# Patient Record
Sex: MALE | Race: WHITE | NOT HISPANIC OR LATINO | Employment: OTHER | URBAN - METROPOLITAN AREA
[De-identification: names, ages, dates, MRNs, and addresses within clinical notes are randomized per-mention and may not be internally consistent; named-entity substitution may affect disease eponyms.]

---

## 2018-02-07 NOTE — PROGRESS NOTES
"                       Pre-Operative Orthopedic Assessment      Patient: Robby Hernandes    YOB: 1970      Age/Gender: 48 y.o. male  Medical Record Number: [id number]  Surgical Procedure Planned: LT TOTAL HIP ARTHROPLASTY ANTERIOR WITH HANA TABLE     Surgeon: Dameon Briggs MD    Date of Surgery Planned: 02/21/2018    Question Value Score    Patient Score   1. What is your age group? 50-65 years  66-75 years  >75 years =2  =1  =0 2   2. Gender? Male  Female =2  =1 2   3. How far on average can you walk? (a block is 200 meters) Two blocks or more (+\- rest)  1-2 blocks (+\- rest)  Housebound (most of time) =2  =1  =0 0   4. Which gait aid to you use? (more often than not) None  Single-Point Stick  Crutches/Frame =2  =1  =0 1   5. Do you use community  supports? (home help, meals on wheels, district nursing) None or one per week  Two or more per week =1  =0 1   6. Will you live with someone who can care for you after your operation? Yes  No =3  =0 3      Your Score (out of 12)  9     Key Destination at Discharge from acute care predicted by score   Scores < 6  -- extended inpatient rehabilitation   Scores 6-9 -- additional intervention to discharge directly home (Rehabilitation in home)   Scores > 9 -- directly home         Discharge Disposition/Planning:     Patient Response   Discussed the Predicted discharge disposition needed based on RAPT Assessment with the patient.    yes   Patient selected discharge disposition:   home   Out Patient Rehabilitation Facility of Choice:    none   Home Health Services Preferred:   Unsure of agency but used \"[facility]\" last year with right hip replacement and requests him again.  He will bring the information for \"[facility]\".   Has the patient completed or been scheduled to complete pre-operative testing? yes   Post-Operative Care Giver Name and Phone Number:    Brother Rui  [phone]     Subacute Inpatient Rehabilitation:  Complete this section only if planning inpatient " services at a Subacute Facility     Patient Response   Subacute Facility Preferred (Please list 2 facilities:      Requires pre-certification for inpatient rehabilitation services?         Planned source of transportation to inpatient rehabilitation facility?       If choosing inpatient services at an Acute or Subacute Facility please list a subsequent back-up plan (in case patient fails to qualify for inpatient rehabilitation). Back-up plans should include caregiver (family member or friend) for first 24-48 post- -operatively.       Home Equipment Patient Response   Does patient have a walker for home use?    yes   Does patient have a 3 in 1 commode for home use?    NO/NEEDS   Does patient have a shower chair for home use?    yes   Does patient have an elevated commode seat for home use? no   Does patient have a cane for home use?    yes   Is there any other medical equipment in the home? If so,  List in comment section below      Pre-Operative Class Attendance Patient Response   Attended or scheduled to attend the pre-operative class within 1 year of total joint replacement? He believes he had a class at last hospital either [facility] or [facility]   Not planning to attend the pre-operative class (see comments below)    Patient is coming from out of town and is not scheduled around a class time.   Patient Education  Completed   Expected time of discharge discussed yes   Encouraged to attend Pre-Operative Class    yes   Education re: Back-up plan for patients planning discharge to subacute facilities n/a   Education re: Predicted Discharge Disposition based on RAPT score    yes   Patient receptive and voiced understanding of information given    yes                                                                                                            Comments:    Address verified.  Patient does not have any steps to enter home.  Patient resides alone and states that he stayed with brother after last surgery but plans  "home and will ask brother to stay with him.  Patient had right hip at another hospital and is unsure of HH agency but does have number for the therapist he prefers \"[facility]\".  Patient will try to contact [facility]ier[facility] for agency name.                                       Signature: Divine Aldana RN    Date:  2/7/2018              "

## 2018-02-15 ENCOUNTER — HOSPITAL ENCOUNTER (OUTPATIENT)
Dept: GENERAL RADIOLOGY | Facility: HOSPITAL | Age: 48
Discharge: HOME | End: 2018-02-15
Admitting: ORTHOPAEDIC SURGERY

## 2018-02-15 ENCOUNTER — APPOINTMENT (OUTPATIENT)
Dept: PREADMISSION TESTING | Facility: HOSPITAL | Age: 48
End: 2018-02-15

## 2018-02-15 ENCOUNTER — HOSPITAL ENCOUNTER (OUTPATIENT)
Dept: GENERAL RADIOLOGY | Facility: HOSPITAL | Age: 48
Discharge: HOME | End: 2018-02-15

## 2018-02-15 VITALS
HEART RATE: 117 BPM | BODY MASS INDEX: 37.92 KG/M2 | HEIGHT: 69 IN | OXYGEN SATURATION: 96 % | DIASTOLIC BLOOD PRESSURE: 84 MMHG | WEIGHT: 256 LBS | RESPIRATION RATE: 18 BRPM | SYSTOLIC BLOOD PRESSURE: 141 MMHG | TEMPERATURE: 98.6 F

## 2018-02-15 LAB
ANION GAP SERPL CALCULATED.3IONS-SCNC: 12.5 MMOL/L
BILIRUB UR QL STRIP: NEGATIVE
BUN BLD-MCNC: 17 MG/DL (ref 6–20)
BUN/CREAT SERPL: 21.5 (ref 7–25)
CALCIUM SPEC-SCNC: 10.2 MG/DL (ref 8.6–10.5)
CHLORIDE SERPL-SCNC: 93 MMOL/L (ref 98–107)
CLARITY UR: CLEAR
CO2 SERPL-SCNC: 29.5 MMOL/L (ref 22–29)
COLOR UR: YELLOW
CREAT BLD-MCNC: 0.79 MG/DL (ref 0.76–1.27)
DEPRECATED RDW RBC AUTO: 40.1 FL (ref 37–54)
ERYTHROCYTE [DISTWIDTH] IN BLOOD BY AUTOMATED COUNT: 12.3 % (ref 11.5–14.5)
GFR SERPL CREATININE-BSD FRML MDRD: 104 ML/MIN/1.73
GLUCOSE BLD-MCNC: 233 MG/DL (ref 65–99)
GLUCOSE UR STRIP-MCNC: ABNORMAL MG/DL
HBA1C MFR BLD: 7.11 % (ref 4.8–5.6)
HCT VFR BLD AUTO: 46.1 % (ref 40.4–52.2)
HGB BLD-MCNC: 16.2 G/DL (ref 13.7–17.6)
HGB UR QL STRIP.AUTO: NEGATIVE
KETONES UR QL STRIP: NEGATIVE
LEUKOCYTE ESTERASE UR QL STRIP.AUTO: NEGATIVE
MCH RBC QN AUTO: 31.6 PG (ref 27–32.7)
MCHC RBC AUTO-ENTMCNC: 35.1 G/DL (ref 32.6–36.4)
MCV RBC AUTO: 89.9 FL (ref 79.8–96.2)
NITRITE UR QL STRIP: NEGATIVE
PH UR STRIP.AUTO: <=5 [PH] (ref 5–8)
PLATELET # BLD AUTO: 257 10*3/MM3 (ref 140–500)
PMV BLD AUTO: 10.4 FL (ref 6–12)
POTASSIUM BLD-SCNC: 3.8 MMOL/L (ref 3.5–5.2)
PROT UR QL STRIP: NEGATIVE
RBC # BLD AUTO: 5.13 10*6/MM3 (ref 4.6–6)
SODIUM BLD-SCNC: 135 MMOL/L (ref 136–145)
SP GR UR STRIP: 1.02 (ref 1–1.03)
UROBILINOGEN UR QL STRIP: ABNORMAL
WBC NRBC COR # BLD: 10.33 10*3/MM3 (ref 4.5–10.7)

## 2018-02-15 PROCEDURE — 73502 X-RAY EXAM HIP UNI 2-3 VIEWS: CPT

## 2018-02-15 PROCEDURE — 83036 HEMOGLOBIN GLYCOSYLATED A1C: CPT | Performed by: ORTHOPAEDIC SURGERY

## 2018-02-15 PROCEDURE — 85027 COMPLETE CBC AUTOMATED: CPT | Performed by: ORTHOPAEDIC SURGERY

## 2018-02-15 PROCEDURE — 93010 ELECTROCARDIOGRAM REPORT: CPT | Performed by: INTERNAL MEDICINE

## 2018-02-15 PROCEDURE — 71046 X-RAY EXAM CHEST 2 VIEWS: CPT

## 2018-02-15 PROCEDURE — 93005 ELECTROCARDIOGRAM TRACING: CPT

## 2018-02-15 PROCEDURE — 80048 BASIC METABOLIC PNL TOTAL CA: CPT | Performed by: ORTHOPAEDIC SURGERY

## 2018-02-15 PROCEDURE — 81003 URINALYSIS AUTO W/O SCOPE: CPT | Performed by: ORTHOPAEDIC SURGERY

## 2018-02-15 PROCEDURE — 36415 COLL VENOUS BLD VENIPUNCTURE: CPT

## 2018-02-15 RX ORDER — PRAVASTATIN SODIUM 40 MG
60 TABLET ORAL NIGHTLY
COMMUNITY
End: 2021-12-07

## 2018-02-15 RX ORDER — CHLORHEXIDINE GLUCONATE 500 MG/1
1 CLOTH TOPICAL TAKE AS DIRECTED
Status: ON HOLD | COMMUNITY
Start: 2018-02-20 | End: 2018-02-21

## 2018-02-15 RX ORDER — FENOFIBRATE 145 MG/1
145 TABLET, COATED ORAL NIGHTLY
Status: ON HOLD | COMMUNITY
End: 2018-02-21

## 2018-02-15 RX ORDER — VENLAFAXINE 75 MG/1
75 TABLET ORAL DAILY
COMMUNITY
End: 2021-12-07

## 2018-02-15 RX ORDER — HYDROCHLOROTHIAZIDE 12.5 MG/1
12.5 TABLET ORAL DAILY
COMMUNITY
End: 2021-12-07

## 2018-02-15 RX ORDER — CHOLECALCIFEROL (VITAMIN D3) 125 MCG
2000 CAPSULE ORAL DAILY
Status: ON HOLD | COMMUNITY
End: 2018-02-21

## 2018-02-15 ASSESSMENT — HOOS JR
HOOS JR SCORE: 16
HOOS JR SCORE: 39.902

## 2018-02-15 NOTE — DISCHARGE INSTRUCTIONS
Take the following medications the morning of surgery with a small sip of water:NONE    ARRIVE 9AM    General Instructions:  • Do not eat solid food after midnight the night before surgery.  • You may drink clear liquids day of surgery but must stop at least one hour before your hospital arrival time.  • It is beneficial for you to have a clear drink that contains carbohydrates the day of surgery.  We suggest a 12 to 20 ounce bottle of Gatorade or Powerade for non-diabetic patients or a 12 to 20 ounce bottle of G2 or Powerade Zero for diabetic patients. (Pediatric patients, are not advised to drink a 12 to 20 ounce carbohydrate drink)    Clear liquids are liquids you can see through.  Nothing red in color.     Plain water                               Sports drinks  Sodas                                   Gelatin (Jell-O)  Fruit juices without pulp such as white grape juice and apple juice  Popsicles that contain no fruit or yogurt  Tea or coffee (no cream or milk added)  Gatorade / Powerade  G2 / Powerade Zero    • Infants may have breast milk up to four hours before surgery.  • Infants drinking formula may drink formula up to six hours before surgery.   • Patients who avoid smoking, chewing tobacco and alcohol for 4 weeks prior to surgery have a reduced risk of post-operative complications.  Quit smoking as many days before surgery as you can.  • Do not smoke, use chewing tobacco or drink alcohol the day of surgery.   • If applicable bring your C-PAP/ BI-PAP machine.  • Bring any papers given to you in the doctor’s office.  • Wear clean comfortable clothes and socks.  • Do not wear contact lenses or make-up.  Bring a case for your glasses.   • Bring crutches or walker if applicable.  • Remove all piercings.  Leave jewelry and any other valuables at home.  • Hair extensions with metal clips must be removed prior to surgery.  • The Pre-Admission Testing nurse will instruct you to bring medications if unable to  obtain an accurate list in Pre-Admission Testing.        Preventing a Surgical Site Infection:  • For 2 to 3 days before surgery, avoid shaving with a razor because the razor can irritate skin and make it easier to develop an infection.  • The night prior to surgery sleep in a clean bed with clean clothing.  Do not allow pets to sleep with you.  • Shower on the morning of surgery using a fresh bar of anti-bacterial soap (such as Dial) and clean washcloth.  Dry with a clean towel and dress in clean clothing.  • Ask your surgeon if you will be receiving antibiotics prior to surgery.  • Make sure you, your family, and all healthcare providers clean their hands with soap and water or an alcohol based hand  before caring for you or your wound.    Day of surgery:  Upon arrival, a Pre-op nurse and Anesthesiologist will review your health history, obtain vital signs, and answer questions you may have.  The only belongings needed at this time will be your home medications and if applicable your C-PAP/BI-PAP machine.  If you are staying overnight your family can leave the rest of your belongings in the car and bring them to your room later.  A Pre-op nurse will start an IV and you may receive medication in preparation for surgery, including something to help you relax.  Your family will be able to see you in the Pre-op area.  While you are in surgery your family should notify the waiting room  if they leave the waiting room area and provide a contact phone number.    Please be aware that surgery does come with discomfort.  We want to make every effort to control your discomfort so please discuss any uncontrolled symptoms with your nurse.   Your doctor will most likely have prescribed pain medications.      If you are going home after surgery you will receive individualized written care instructions before being discharged.  A responsible adult must drive you to and from the hospital on the day of your  surgery and stay with you for 24 hours.    If you are staying overnight following surgery, you will be transported to your hospital room following the recovery period.  [facility] has all private rooms.    If you have any questions please call Pre-Admission Testing at [phone].  Deductibles and co-payments are collected on the day of service. Please be prepared to pay the required co-pay, deductible or deposit on the day of service as defined by your plan.    2% CHLORAHEXIDINE GLUCONATE* CLOTH  Preparing or “prepping” skin before surgery can reduce the risk of infection at the surgical site. To make the process easier, [facility] has chosen disposable cloths moistened with a rinse-free, 2% Chlorhexidine Gluconate (CHG) antiseptic solution. The steps below outline the prepping process and should be carefully followed.        Use the prep cloth on the area that is circled in the diagram             Directions Night before Surgery  1) Shower using a fresh bar of anti-bacterial soap (such as Dial) and clean washcloth.  Use a clean towel to completely dry your skin.  2) Do not use any lotions, oils or creams on your skin.  3) Open the package and remove 1 cloth, wipe your skin for 30 seconds in a circular motion.  Allow to dry for 3 minutes.  4) Repeat #3 with second cloth.  5) Do not touch your eyes, ears, or mouth with the prep cloth.  6) Allow the wet prep solution to air dry.  7) Discard the prep cloth and wash your hands with soap and water.   8) Dress in clean bed clothes and sleep on fresh clean bed sheets.   9) You may experience some temporary itching after the prep.    Directions Day of Surgery  1) Repeat steps 1,2,3,4,5,6,7, and 9.   2) Dress in clean clothes before coming to the hospital.    BACTROBAN NASAL OINTMENT  There are many germs normally in your nose. Bactroban is an ointment that will help reduce these germs. Please follow these instructions for Bactroban  use:    ____Two days before surgery in the evening Date________    ____The day before surgery in the morning  Date________    ____The day before surgery in the evening              Date________    ____The day of surgery in the morning    Date________    **Squirt ½ package of Bactroban Ointment onto a cotton applicator and apply to inside of 1st nostril.  Squirt the remaining Bactroban and apply to the inside of the other nostril.

## 2018-02-21 ENCOUNTER — APPOINTMENT (OUTPATIENT)
Dept: GENERAL RADIOLOGY | Facility: HOSPITAL | Age: 48
End: 2018-02-21

## 2018-02-21 ENCOUNTER — ANESTHESIA EVENT (OUTPATIENT)
Dept: PERIOP | Facility: HOSPITAL | Age: 48
End: 2018-02-21

## 2018-02-21 ENCOUNTER — HOSPITAL ENCOUNTER (INPATIENT)
Facility: HOSPITAL | Age: 48
LOS: 1 days | Discharge: HOME HEALTH | End: 2018-02-22
Attending: ORTHOPAEDIC SURGERY | Admitting: ORTHOPAEDIC SURGERY

## 2018-02-21 ENCOUNTER — ANESTHESIA (OUTPATIENT)
Dept: PERIOP | Facility: HOSPITAL | Age: 48
End: 2018-02-21

## 2018-02-21 DIAGNOSIS — Z96.642 STATUS POST TOTAL REPLACEMENT OF LEFT HIP: Primary | ICD-10-CM

## 2018-02-21 PROBLEM — E11.65 TYPE 2 DIABETES MELLITUS WITH HYPERGLYCEMIA: Status: ACTIVE | Noted: 2018-02-21

## 2018-02-21 PROBLEM — E11.9 DIABETES MELLITUS: Status: ACTIVE | Noted: 2018-02-21

## 2018-02-21 PROBLEM — M16.12 OSTEOARTHRITIS OF LEFT HIP: Status: ACTIVE | Noted: 2018-02-21

## 2018-02-21 PROBLEM — I10 HYPERTENSION: Status: ACTIVE | Noted: 2018-02-21

## 2018-02-21 PROBLEM — J44.9 COPD (CHRONIC OBSTRUCTIVE PULMONARY DISEASE): Status: ACTIVE | Noted: 2018-02-21

## 2018-02-21 PROBLEM — R00.0 SINUS TACHYCARDIA: Status: ACTIVE | Noted: 2018-02-21

## 2018-02-21 LAB
GLUCOSE BLDC GLUCOMTR-MCNC: 136 MG/DL (ref 70–130)
GLUCOSE BLDC GLUCOMTR-MCNC: 194 MG/DL (ref 70–130)
GLUCOSE BLDC GLUCOMTR-MCNC: 207 MG/DL (ref 70–130)
GLUCOSE BLDC GLUCOMTR-MCNC: 229 MG/DL (ref 70–130)

## 2018-02-21 PROCEDURE — 25010000002 FENTANYL CITRATE (PF) 100 MCG/2ML SOLUTION: Performed by: NURSE ANESTHETIST, CERTIFIED REGISTERED

## 2018-02-21 PROCEDURE — 25010000002 NEOSTIGMINE PER 0.5 MG: Performed by: NURSE ANESTHETIST, CERTIFIED REGISTERED

## 2018-02-21 PROCEDURE — 73501 X-RAY EXAM HIP UNI 1 VIEW: CPT

## 2018-02-21 PROCEDURE — 25810000003 SODIUM CHLORIDE 0.9 % WITH KCL 20 MEQ 20-0.9 MEQ/L-% SOLUTION: Performed by: ORTHOPAEDIC SURGERY

## 2018-02-21 PROCEDURE — 97162 PT EVAL MOD COMPLEX 30 MIN: CPT

## 2018-02-21 PROCEDURE — 0SRB04A REPLACEMENT OF LEFT HIP JOINT WITH CERAMIC ON POLYETHYLENE SYNTHETIC SUBSTITUTE, UNCEMENTED, OPEN APPROACH: ICD-10-PCS | Performed by: ORTHOPAEDIC SURGERY

## 2018-02-21 PROCEDURE — 25010000003 CEFAZOLIN IN DEXTROSE 2-4 GM/100ML-% SOLUTION: Performed by: ORTHOPAEDIC SURGERY

## 2018-02-21 PROCEDURE — 25010000002 FENTANYL CITRATE (PF) 100 MCG/2ML SOLUTION: Performed by: ANESTHESIOLOGY

## 2018-02-21 PROCEDURE — 25010000002 PROPOFOL 10 MG/ML EMULSION: Performed by: NURSE ANESTHETIST, CERTIFIED REGISTERED

## 2018-02-21 PROCEDURE — 97110 THERAPEUTIC EXERCISES: CPT

## 2018-02-21 PROCEDURE — 63710000001 INSULIN ASPART PER 5 UNITS: Performed by: INTERNAL MEDICINE

## 2018-02-21 PROCEDURE — C1776 JOINT DEVICE (IMPLANTABLE): HCPCS | Performed by: ORTHOPAEDIC SURGERY

## 2018-02-21 PROCEDURE — 76000 FLUOROSCOPY <1 HR PHYS/QHP: CPT

## 2018-02-21 PROCEDURE — 25010000002 ONDANSETRON PER 1 MG: Performed by: NURSE ANESTHETIST, CERTIFIED REGISTERED

## 2018-02-21 PROCEDURE — 25010000002 MIDAZOLAM PER 1 MG: Performed by: ANESTHESIOLOGY

## 2018-02-21 PROCEDURE — 82962 GLUCOSE BLOOD TEST: CPT

## 2018-02-21 PROCEDURE — 25010000002 HYDROMORPHONE PER 4 MG: Performed by: NURSE ANESTHETIST, CERTIFIED REGISTERED

## 2018-02-21 PROCEDURE — 93005 ELECTROCARDIOGRAM TRACING: CPT | Performed by: ORTHOPAEDIC SURGERY

## 2018-02-21 PROCEDURE — 93010 ELECTROCARDIOGRAM REPORT: CPT | Performed by: INTERNAL MEDICINE

## 2018-02-21 PROCEDURE — 25010000002 HYDRALAZINE PER 20 MG: Performed by: NURSE ANESTHETIST, CERTIFIED REGISTERED

## 2018-02-21 DEVICE — IMPLANTABLE DEVICE: Type: IMPLANTABLE DEVICE | Site: HIP | Status: FUNCTIONAL

## 2018-02-21 DEVICE — TOTAL HIP PRIMARY: Type: IMPLANTABLE DEVICE | Site: HIP | Status: FUNCTIONAL

## 2018-02-21 DEVICE — CAP HIP TM UPCHRG: Type: IMPLANTABLE DEVICE | Site: HIP | Status: FUNCTIONAL

## 2018-02-21 DEVICE — SCRW ACET CORT TRILOGY S/TAP 6.5X30: Type: IMPLANTABLE DEVICE | Site: HIP | Status: FUNCTIONAL

## 2018-02-21 DEVICE — BIOLOX® DELTA, CERAMIC FEMORAL HEAD, S, Ø 36/-3.5, TAPER 12/14
Type: IMPLANTABLE DEVICE | Site: HIP | Status: FUNCTIONAL
Brand: BIOLOX® DELTA

## 2018-02-21 DEVICE — CAP HIP VE UPCHRG: Type: IMPLANTABLE DEVICE | Site: HIP | Status: FUNCTIONAL

## 2018-02-21 DEVICE — PLUG DOME HL: Type: IMPLANTABLE DEVICE | Site: HIP | Status: FUNCTIONAL

## 2018-02-21 DEVICE — CAP CERAM HD HIP UPCHRG: Type: IMPLANTABLE DEVICE | Site: HIP | Status: FUNCTIONAL

## 2018-02-21 RX ORDER — FLUMAZENIL 0.1 MG/ML
0.2 INJECTION INTRAVENOUS AS NEEDED
Status: DISCONTINUED | OUTPATIENT
Start: 2018-02-21 | End: 2018-02-21 | Stop reason: HOSPADM

## 2018-02-21 RX ORDER — ONDANSETRON 4 MG/1
4 TABLET, ORALLY DISINTEGRATING ORAL EVERY 6 HOURS PRN
Status: DISCONTINUED | OUTPATIENT
Start: 2018-02-21 | End: 2018-02-22 | Stop reason: HOSPADM

## 2018-02-21 RX ORDER — FENTANYL CITRATE 50 UG/ML
50 INJECTION, SOLUTION INTRAMUSCULAR; INTRAVENOUS
Status: DISCONTINUED | OUTPATIENT
Start: 2018-02-21 | End: 2018-02-21 | Stop reason: HOSPADM

## 2018-02-21 RX ORDER — CEFAZOLIN SODIUM 2 G/100ML
2 INJECTION, SOLUTION INTRAVENOUS ONCE
Status: COMPLETED | OUTPATIENT
Start: 2018-02-21 | End: 2018-02-21

## 2018-02-21 RX ORDER — MIDAZOLAM HYDROCHLORIDE 1 MG/ML
1 INJECTION INTRAMUSCULAR; INTRAVENOUS
Status: DISCONTINUED | OUTPATIENT
Start: 2018-02-21 | End: 2018-02-21 | Stop reason: HOSPADM

## 2018-02-21 RX ORDER — BISACODYL 10 MG
10 SUPPOSITORY, RECTAL RECTAL DAILY PRN
Status: DISCONTINUED | OUTPATIENT
Start: 2018-02-21 | End: 2018-02-22 | Stop reason: HOSPADM

## 2018-02-21 RX ORDER — SENNA AND DOCUSATE SODIUM 50; 8.6 MG/1; MG/1
2 TABLET, FILM COATED ORAL 2 TIMES DAILY
Status: DISCONTINUED | OUTPATIENT
Start: 2018-02-21 | End: 2018-02-22 | Stop reason: HOSPADM

## 2018-02-21 RX ORDER — TRANEXAMIC ACID 100 MG/ML
INJECTION, SOLUTION INTRAVENOUS AS NEEDED
Status: DISCONTINUED | OUTPATIENT
Start: 2018-02-21 | End: 2018-02-21 | Stop reason: SURG

## 2018-02-21 RX ORDER — FAMOTIDINE 10 MG/ML
20 INJECTION, SOLUTION INTRAVENOUS ONCE
Status: COMPLETED | OUTPATIENT
Start: 2018-02-21 | End: 2018-02-21

## 2018-02-21 RX ORDER — UREA 10 %
1 LOTION (ML) TOPICAL NIGHTLY PRN
Status: DISCONTINUED | OUTPATIENT
Start: 2018-02-21 | End: 2018-02-22 | Stop reason: HOSPADM

## 2018-02-21 RX ORDER — PROMETHAZINE HYDROCHLORIDE 25 MG/ML
12.5 INJECTION, SOLUTION INTRAMUSCULAR; INTRAVENOUS ONCE AS NEEDED
Status: DISCONTINUED | OUTPATIENT
Start: 2018-02-21 | End: 2018-02-21 | Stop reason: HOSPADM

## 2018-02-21 RX ORDER — KETOROLAC TROMETHAMINE 30 MG/ML
15 INJECTION, SOLUTION INTRAMUSCULAR; INTRAVENOUS EVERY 6 HOURS PRN
Status: DISCONTINUED | OUTPATIENT
Start: 2018-02-21 | End: 2018-02-22 | Stop reason: HOSPADM

## 2018-02-21 RX ORDER — ATORVASTATIN CALCIUM 10 MG/1
10 TABLET, FILM COATED ORAL DAILY
Status: DISCONTINUED | OUTPATIENT
Start: 2018-02-21 | End: 2018-02-22 | Stop reason: HOSPADM

## 2018-02-21 RX ORDER — SODIUM CHLORIDE, SODIUM LACTATE, POTASSIUM CHLORIDE, CALCIUM CHLORIDE 600; 310; 30; 20 MG/100ML; MG/100ML; MG/100ML; MG/100ML
9 INJECTION, SOLUTION INTRAVENOUS CONTINUOUS
Status: DISCONTINUED | OUTPATIENT
Start: 2018-02-21 | End: 2018-02-21 | Stop reason: HOSPADM

## 2018-02-21 RX ORDER — SODIUM CHLORIDE 0.9 % (FLUSH) 0.9 %
1-10 SYRINGE (ML) INJECTION AS NEEDED
Status: DISCONTINUED | OUTPATIENT
Start: 2018-02-21 | End: 2018-02-21 | Stop reason: HOSPADM

## 2018-02-21 RX ORDER — ONDANSETRON 2 MG/ML
INJECTION INTRAMUSCULAR; INTRAVENOUS AS NEEDED
Status: DISCONTINUED | OUTPATIENT
Start: 2018-02-21 | End: 2018-02-21 | Stop reason: SURG

## 2018-02-21 RX ORDER — SODIUM CHLORIDE 0.9 % (FLUSH) 0.9 %
1-10 SYRINGE (ML) INJECTION AS NEEDED
Status: DISCONTINUED | OUTPATIENT
Start: 2018-02-21 | End: 2018-02-22 | Stop reason: HOSPADM

## 2018-02-21 RX ORDER — ONDANSETRON 2 MG/ML
4 INJECTION INTRAMUSCULAR; INTRAVENOUS ONCE AS NEEDED
Status: DISCONTINUED | OUTPATIENT
Start: 2018-02-21 | End: 2018-02-21 | Stop reason: HOSPADM

## 2018-02-21 RX ORDER — HYDROCODONE BITARTRATE AND ACETAMINOPHEN 7.5; 325 MG/1; MG/1
1 TABLET ORAL ONCE AS NEEDED
Status: DISCONTINUED | OUTPATIENT
Start: 2018-02-21 | End: 2018-02-21 | Stop reason: HOSPADM

## 2018-02-21 RX ORDER — ONDANSETRON 4 MG/1
4 TABLET, FILM COATED ORAL EVERY 6 HOURS PRN
Status: DISCONTINUED | OUTPATIENT
Start: 2018-02-21 | End: 2018-02-22 | Stop reason: HOSPADM

## 2018-02-21 RX ORDER — VENLAFAXINE 75 MG/1
75 TABLET ORAL DAILY
Status: DISCONTINUED | OUTPATIENT
Start: 2018-02-21 | End: 2018-02-22 | Stop reason: HOSPADM

## 2018-02-21 RX ORDER — PROMETHAZINE HYDROCHLORIDE 25 MG/1
12.5 TABLET ORAL ONCE AS NEEDED
Status: DISCONTINUED | OUTPATIENT
Start: 2018-02-21 | End: 2018-02-21 | Stop reason: HOSPADM

## 2018-02-21 RX ORDER — HYDRALAZINE HYDROCHLORIDE 20 MG/ML
5 INJECTION INTRAMUSCULAR; INTRAVENOUS
Status: DISCONTINUED | OUTPATIENT
Start: 2018-02-21 | End: 2018-02-21 | Stop reason: HOSPADM

## 2018-02-21 RX ORDER — HYDROMORPHONE HCL 110MG/55ML
PATIENT CONTROLLED ANALGESIA SYRINGE INTRAVENOUS AS NEEDED
Status: DISCONTINUED | OUTPATIENT
Start: 2018-02-21 | End: 2018-02-21 | Stop reason: SURG

## 2018-02-21 RX ORDER — NALOXONE HCL 0.4 MG/ML
0.1 VIAL (ML) INJECTION
Status: DISCONTINUED | OUTPATIENT
Start: 2018-02-21 | End: 2018-02-22 | Stop reason: HOSPADM

## 2018-02-21 RX ORDER — HYDROCHLOROTHIAZIDE 25 MG/1
12.5 TABLET ORAL DAILY
Status: DISCONTINUED | OUTPATIENT
Start: 2018-02-21 | End: 2018-02-22 | Stop reason: HOSPADM

## 2018-02-21 RX ORDER — PROPOFOL 10 MG/ML
VIAL (ML) INTRAVENOUS AS NEEDED
Status: DISCONTINUED | OUTPATIENT
Start: 2018-02-21 | End: 2018-02-21 | Stop reason: SURG

## 2018-02-21 RX ORDER — IPRATROPIUM BROMIDE AND ALBUTEROL SULFATE 2.5; .5 MG/3ML; MG/3ML
3 SOLUTION RESPIRATORY (INHALATION) EVERY 6 HOURS PRN
Status: DISCONTINUED | OUTPATIENT
Start: 2018-02-21 | End: 2018-02-22 | Stop reason: HOSPADM

## 2018-02-21 RX ORDER — MIDAZOLAM HYDROCHLORIDE 1 MG/ML
2 INJECTION INTRAMUSCULAR; INTRAVENOUS
Status: DISCONTINUED | OUTPATIENT
Start: 2018-02-21 | End: 2018-02-21 | Stop reason: HOSPADM

## 2018-02-21 RX ORDER — GLYCOPYRROLATE 0.2 MG/ML
INJECTION INTRAMUSCULAR; INTRAVENOUS AS NEEDED
Status: DISCONTINUED | OUTPATIENT
Start: 2018-02-21 | End: 2018-02-21 | Stop reason: SURG

## 2018-02-21 RX ORDER — PROMETHAZINE HYDROCHLORIDE 25 MG/1
25 SUPPOSITORY RECTAL ONCE AS NEEDED
Status: DISCONTINUED | OUTPATIENT
Start: 2018-02-21 | End: 2018-02-21 | Stop reason: HOSPADM

## 2018-02-21 RX ORDER — DIPHENHYDRAMINE HYDROCHLORIDE 50 MG/ML
12.5 INJECTION INTRAMUSCULAR; INTRAVENOUS
Status: DISCONTINUED | OUTPATIENT
Start: 2018-02-21 | End: 2018-02-21 | Stop reason: HOSPADM

## 2018-02-21 RX ORDER — SODIUM CHLORIDE AND POTASSIUM CHLORIDE 150; 900 MG/100ML; MG/100ML
100 INJECTION, SOLUTION INTRAVENOUS CONTINUOUS
Status: DISCONTINUED | OUTPATIENT
Start: 2018-02-21 | End: 2018-02-22

## 2018-02-21 RX ORDER — DEXTROSE MONOHYDRATE 25 G/50ML
25 INJECTION, SOLUTION INTRAVENOUS
Status: DISCONTINUED | OUTPATIENT
Start: 2018-02-21 | End: 2018-02-22 | Stop reason: HOSPADM

## 2018-02-21 RX ORDER — HYDROMORPHONE HCL 110MG/55ML
0.5 PATIENT CONTROLLED ANALGESIA SYRINGE INTRAVENOUS
Status: DISCONTINUED | OUTPATIENT
Start: 2018-02-21 | End: 2018-02-22 | Stop reason: HOSPADM

## 2018-02-21 RX ORDER — LIDOCAINE HYDROCHLORIDE 10 MG/ML
0.5 INJECTION, SOLUTION EPIDURAL; INFILTRATION; INTRACAUDAL; PERINEURAL ONCE AS NEEDED
Status: DISCONTINUED | OUTPATIENT
Start: 2018-02-21 | End: 2018-02-21 | Stop reason: HOSPADM

## 2018-02-21 RX ORDER — LABETALOL HYDROCHLORIDE 5 MG/ML
5 INJECTION, SOLUTION INTRAVENOUS
Status: DISCONTINUED | OUTPATIENT
Start: 2018-02-21 | End: 2018-02-21 | Stop reason: HOSPADM

## 2018-02-21 RX ORDER — OXYCODONE AND ACETAMINOPHEN 7.5; 325 MG/1; MG/1
1 TABLET ORAL ONCE AS NEEDED
Status: DISCONTINUED | OUTPATIENT
Start: 2018-02-21 | End: 2018-02-21 | Stop reason: HOSPADM

## 2018-02-21 RX ORDER — LIDOCAINE HYDROCHLORIDE 20 MG/ML
INJECTION, SOLUTION INFILTRATION; PERINEURAL AS NEEDED
Status: DISCONTINUED | OUTPATIENT
Start: 2018-02-21 | End: 2018-02-21 | Stop reason: SURG

## 2018-02-21 RX ORDER — NICOTINE POLACRILEX 4 MG
15 LOZENGE BUCCAL
Status: DISCONTINUED | OUTPATIENT
Start: 2018-02-21 | End: 2018-02-22 | Stop reason: HOSPADM

## 2018-02-21 RX ORDER — NALOXONE HCL 0.4 MG/ML
0.2 VIAL (ML) INJECTION AS NEEDED
Status: DISCONTINUED | OUTPATIENT
Start: 2018-02-21 | End: 2018-02-21 | Stop reason: HOSPADM

## 2018-02-21 RX ORDER — FAMOTIDINE 20 MG/1
40 TABLET, FILM COATED ORAL DAILY
Status: DISCONTINUED | OUTPATIENT
Start: 2018-02-21 | End: 2018-02-22 | Stop reason: HOSPADM

## 2018-02-21 RX ORDER — ROCURONIUM BROMIDE 10 MG/ML
INJECTION, SOLUTION INTRAVENOUS AS NEEDED
Status: DISCONTINUED | OUTPATIENT
Start: 2018-02-21 | End: 2018-02-21 | Stop reason: SURG

## 2018-02-21 RX ORDER — CHLORDIAZEPOXIDE HYDROCHLORIDE 25 MG/1
25 CAPSULE, GELATIN COATED ORAL NIGHTLY
Status: DISCONTINUED | OUTPATIENT
Start: 2018-02-21 | End: 2018-02-22 | Stop reason: HOSPADM

## 2018-02-21 RX ORDER — ACETAMINOPHEN 325 MG/1
325 TABLET ORAL EVERY 4 HOURS PRN
Status: DISCONTINUED | OUTPATIENT
Start: 2018-02-21 | End: 2018-02-22 | Stop reason: HOSPADM

## 2018-02-21 RX ORDER — OXYCODONE AND ACETAMINOPHEN 10; 325 MG/1; MG/1
1 TABLET ORAL EVERY 4 HOURS PRN
Status: DISCONTINUED | OUTPATIENT
Start: 2018-02-21 | End: 2018-02-22 | Stop reason: HOSPADM

## 2018-02-21 RX ORDER — ONDANSETRON 2 MG/ML
4 INJECTION INTRAMUSCULAR; INTRAVENOUS EVERY 6 HOURS PRN
Status: DISCONTINUED | OUTPATIENT
Start: 2018-02-21 | End: 2018-02-22 | Stop reason: HOSPADM

## 2018-02-21 RX ORDER — EPHEDRINE SULFATE 50 MG/ML
5 INJECTION, SOLUTION INTRAVENOUS ONCE AS NEEDED
Status: DISCONTINUED | OUTPATIENT
Start: 2018-02-21 | End: 2018-02-21 | Stop reason: HOSPADM

## 2018-02-21 RX ORDER — OXYCODONE HYDROCHLORIDE 5 MG/1
20 TABLET ORAL EVERY 4 HOURS PRN
Status: DISCONTINUED | OUTPATIENT
Start: 2018-02-21 | End: 2018-02-22 | Stop reason: HOSPADM

## 2018-02-21 RX ORDER — DIAZEPAM 5 MG/1
5 TABLET ORAL EVERY 6 HOURS PRN
Status: DISCONTINUED | OUTPATIENT
Start: 2018-02-21 | End: 2018-02-22 | Stop reason: HOSPADM

## 2018-02-21 RX ORDER — CEFAZOLIN SODIUM 2 G/100ML
2 INJECTION, SOLUTION INTRAVENOUS EVERY 8 HOURS
Status: COMPLETED | OUTPATIENT
Start: 2018-02-21 | End: 2018-02-22

## 2018-02-21 RX ORDER — HYDROMORPHONE HCL 110MG/55ML
0.5 PATIENT CONTROLLED ANALGESIA SYRINGE INTRAVENOUS
Status: DISCONTINUED | OUTPATIENT
Start: 2018-02-21 | End: 2018-02-21 | Stop reason: HOSPADM

## 2018-02-21 RX ORDER — PROMETHAZINE HYDROCHLORIDE 25 MG/1
25 TABLET ORAL ONCE AS NEEDED
Status: DISCONTINUED | OUTPATIENT
Start: 2018-02-21 | End: 2018-02-21 | Stop reason: HOSPADM

## 2018-02-21 RX ORDER — FENTANYL CITRATE 50 UG/ML
INJECTION, SOLUTION INTRAMUSCULAR; INTRAVENOUS AS NEEDED
Status: DISCONTINUED | OUTPATIENT
Start: 2018-02-21 | End: 2018-02-21 | Stop reason: SURG

## 2018-02-21 RX ORDER — MAGNESIUM HYDROXIDE 1200 MG/15ML
LIQUID ORAL AS NEEDED
Status: DISCONTINUED | OUTPATIENT
Start: 2018-02-21 | End: 2018-02-21 | Stop reason: HOSPADM

## 2018-02-21 RX ADMIN — SODIUM CHLORIDE, POTASSIUM CHLORIDE, SODIUM LACTATE AND CALCIUM CHLORIDE: 600; 310; 30; 20 INJECTION, SOLUTION INTRAVENOUS at 11:35

## 2018-02-21 RX ADMIN — FENTANYL CITRATE 50 MCG: 50 INJECTION INTRAMUSCULAR; INTRAVENOUS at 11:20

## 2018-02-21 RX ADMIN — FENTANYL CITRATE 50 MCG: 50 INJECTION, SOLUTION INTRAMUSCULAR; INTRAVENOUS at 13:35

## 2018-02-21 RX ADMIN — ROCURONIUM BROMIDE 10 MG: 10 INJECTION INTRAVENOUS at 10:41

## 2018-02-21 RX ADMIN — SODIUM CHLORIDE, POTASSIUM CHLORIDE, SODIUM LACTATE AND CALCIUM CHLORIDE: 600; 310; 30; 20 INJECTION, SOLUTION INTRAVENOUS at 10:18

## 2018-02-21 RX ADMIN — CEFAZOLIN SODIUM 2 G: 2 INJECTION, SOLUTION INTRAVENOUS at 17:11

## 2018-02-21 RX ADMIN — ATORVASTATIN CALCIUM 10 MG: 10 TABLET, FILM COATED ORAL at 16:13

## 2018-02-21 RX ADMIN — LINAGLIPTIN 5 MG: 5 TABLET, FILM COATED ORAL at 16:13

## 2018-02-21 RX ADMIN — OXYCODONE HYDROCHLORIDE 20 MG: 5 TABLET ORAL at 16:13

## 2018-02-21 RX ADMIN — SODIUM CHLORIDE, POTASSIUM CHLORIDE, SODIUM LACTATE AND CALCIUM CHLORIDE 9 ML/HR: 600; 310; 30; 20 INJECTION, SOLUTION INTRAVENOUS at 09:31

## 2018-02-21 RX ADMIN — LIDOCAINE HYDROCHLORIDE 60 MG: 20 INJECTION, SOLUTION INFILTRATION; PERINEURAL at 10:21

## 2018-02-21 RX ADMIN — MIDAZOLAM 1 MG: 1 INJECTION INTRAMUSCULAR; INTRAVENOUS at 09:55

## 2018-02-21 RX ADMIN — FAMOTIDINE 20 MG: 10 INJECTION, SOLUTION INTRAVENOUS at 09:55

## 2018-02-21 RX ADMIN — VENLAFAXINE HYDROCHLORIDE 75 MG: 75 TABLET ORAL at 16:13

## 2018-02-21 RX ADMIN — GLYCOPYRROLATE 0.4 MG: 0.2 INJECTION INTRAMUSCULAR; INTRAVENOUS at 12:25

## 2018-02-21 RX ADMIN — INSULIN ASPART 3 UNITS: 100 INJECTION, SOLUTION INTRAVENOUS; SUBCUTANEOUS at 21:51

## 2018-02-21 RX ADMIN — OXYCODONE HYDROCHLORIDE 20 MG: 5 TABLET ORAL at 20:41

## 2018-02-21 RX ADMIN — CHLORDIAZEPOXIDE HYDROCHLORIDE 25 MG: 25 CAPSULE ORAL at 21:51

## 2018-02-21 RX ADMIN — Medication 5 MG: at 13:05

## 2018-02-21 RX ADMIN — HYDROCHLOROTHIAZIDE 12.5 MG: 25 TABLET ORAL at 16:13

## 2018-02-21 RX ADMIN — ROCURONIUM BROMIDE 20 MG: 10 INJECTION INTRAVENOUS at 11:25

## 2018-02-21 RX ADMIN — NEOSTIGMINE METHYLSULFATE 3 MG: 1 INJECTION INTRAMUSCULAR; INTRAVENOUS; SUBCUTANEOUS at 12:25

## 2018-02-21 RX ADMIN — TRANEXAMIC ACID 1000 MG: 100 INJECTION, SOLUTION INTRAVENOUS at 10:33

## 2018-02-21 RX ADMIN — PROPOFOL 200 MG: 10 INJECTION, EMULSION INTRAVENOUS at 10:21

## 2018-02-21 RX ADMIN — FAMOTIDINE 40 MG: 20 TABLET, FILM COATED ORAL at 16:13

## 2018-02-21 RX ADMIN — FENTANYL CITRATE 50 MCG: 50 INJECTION, SOLUTION INTRAMUSCULAR; INTRAVENOUS at 13:48

## 2018-02-21 RX ADMIN — ONDANSETRON 4 MG: 2 INJECTION INTRAMUSCULAR; INTRAVENOUS at 10:30

## 2018-02-21 RX ADMIN — METFORMIN HYDROCHLORIDE 1000 MG: 1000 TABLET ORAL at 17:11

## 2018-02-21 RX ADMIN — FENTANYL CITRATE 50 MCG: 50 INJECTION INTRAMUSCULAR; INTRAVENOUS at 10:21

## 2018-02-21 RX ADMIN — FENTANYL CITRATE 50 MCG: 50 INJECTION INTRAMUSCULAR; INTRAVENOUS at 10:40

## 2018-02-21 RX ADMIN — HYDROMORPHONE HYDROCHLORIDE 0.5 MG: 2 INJECTION INTRAMUSCULAR; INTRAVENOUS; SUBCUTANEOUS at 11:40

## 2018-02-21 RX ADMIN — ROCURONIUM BROMIDE 40 MG: 10 INJECTION INTRAVENOUS at 10:21

## 2018-02-21 RX ADMIN — SODIUM CHLORIDE, POTASSIUM CHLORIDE, SODIUM LACTATE AND CALCIUM CHLORIDE: 600; 310; 30; 20 INJECTION, SOLUTION INTRAVENOUS at 10:21

## 2018-02-21 RX ADMIN — FENTANYL CITRATE 50 MCG: 50 INJECTION INTRAMUSCULAR; INTRAVENOUS at 11:46

## 2018-02-21 RX ADMIN — HYDROMORPHONE HYDROCHLORIDE 0.5 MG: 2 INJECTION INTRAMUSCULAR; INTRAVENOUS; SUBCUTANEOUS at 11:17

## 2018-02-21 RX ADMIN — POTASSIUM CHLORIDE AND SODIUM CHLORIDE 100 ML/HR: 900; 150 INJECTION, SOLUTION INTRAVENOUS at 16:10

## 2018-02-21 RX ADMIN — DOCUSATE SODIUM -SENNOSIDES 2 TABLET: 50; 8.6 TABLET, COATED ORAL at 20:41

## 2018-02-21 RX ADMIN — Medication 5 MG: at 12:53

## 2018-02-21 RX ADMIN — CEFAZOLIN SODIUM 2 G: 2 INJECTION, SOLUTION INTRAVENOUS at 10:20

## 2018-02-21 NOTE — H&P
ORTHOPEDIC SURGERY PRE-OP HISTORY AND PHYSICAL      Patient: Robby Hernandes  Date of Admission: 2/21/2018  8:31 AM  YOB: 1970  Medical Record Number: [id number]  Attending Physician: Dameon Briggs MD  Consulting Physician: Dameon Briggs MD    CHIEF COMPLIANT: Left hip Pain.      HISTORY OF PRESENT ILLINESS: Patient is a 48 y.o. year old male presents to [facility] with above complaints.  The patient failed conservative treatment and patient requested surgical intervention.  Presents today for left AYDEN.        ALLERGIES:   Allergies   Allergen Reactions   • Lyrica [Pregabalin] Hallucinations       HOME MEDICATIONS:  Prescriptions Prior to Admission   Medication Sig Dispense Refill Last Dose   • Chlorhexidine Gluconate Cloth 2 % pads Apply  topically Take As Directed.      • Cholecalciferol (VITAMIN D3) 2000 units tablet Take 2,000 Units by mouth Daily.      • fenofibrate (TRICOR) 145 MG tablet Take 145 mg by mouth Daily.      • hydrochlorothiazide (HYDRODIURIL) 12.5 MG tablet Take 12.5 mg by mouth Daily.      • metFORMIN (GLUCOPHAGE) 500 MG tablet Take 1,000 mg by mouth 2 (Two) Times a Day With Meals.      • mupirocin (BACTROBAN) 2 % nasal ointment into each nostril Take As Directed.      • pravastatin (PRAVACHOL) 40 MG tablet Take 40 mg by mouth Daily.      • SITagliptin (JANUVIA) 100 MG tablet Take 100 mg by mouth Daily.      • venlafaxine (EFFEXOR) 75 MG tablet Take 75 mg by mouth Daily.          Past Medical History:   Diagnosis Date   • Anxiety and depression    • Asthma    • Chronic knee pain     RIGHT   • Complex regional pain syndrome     LEFT ANKLE   • COPD (chronic obstructive pulmonary disease)    • Diabetes mellitus    • Fracture of nasal septum 1987    CAN NOT BREATH OUT OF RIGHT NOSTRIL   • High cholesterol    • Hip pain, chronic, left    • History of heart attack     PER EKG DR MILIAN   • History of kidney stones    • [place] (hard of hearing)    • Hypertension    •  Insomnia    • Low back pain    • Shoulder pain, left    • Sleep apnea     DOES NOT USE MACHINE   • Tinnitus      Past Surgical History:   Procedure Laterality Date   • ANKLE FUSION Left    • CYSTOSCOPY BLADDER STONE LITHOTRIPSY     • TOTAL HIP ARTHROPLASTY Right      Social History     Occupational History   • Not on file.     Social History Main Topics   • Smoking status: Former Smoker     Packs/day: 2.00     Years: 10.00     Types: Cigarettes     Quit date: 2008   • Smokeless tobacco: Never Used   • Alcohol use 2.4 oz/week     4 Cans of beer per week   • Drug use: No   • Sexual activity: Not on file      Social History     Social History Narrative   • No narrative on file     Family History   Problem Relation Age of Onset   • Malig Hyperthermia Neg Hx        REVIEW OF SYSTEMS:    HEENT: Patient denies any headaches, vision changes, change in hearing, or tinnitus, Patient denies epistaxis, sinus pain, hoarseness, or dysphagia   Pulmonary: Patient denies any cough, congestion, acute change in SOA or wheezing.   Cardiovascular: Patient denies any change in chest pain, dyspnea, palpitations, weakness, intolerance of exercise, varicosities, change in murmur   Gastrointestinal:  Patient denies change in appetite, melena, change in bowel habits.   Genital/Urinary: Patient denies dysuria, change in color of urine, change in frequency of urination, pain with urgency, change in incontinence, retention.   Musculoskeletal: Patient denies complaints of acute changes in symptoms of other joints not mentioned above.   Neurological: Patient denies changes in dizziness, tremor, ataxia, or difficulty in speaking or changes in memory.   Endocrine system: Patient denies acute changes in tremors, palpitations, polyuria, polydipsia, polyphagia, diaphoresis, exophthalmos, or goiter.   Psychological: Patient denies thoughts/plans or harming self or other; denies acute changes in depression,  insomnia, night terrors, yonis,  disorientation.   Skin: Patient denies any bruising, rashes, discoloration, pruritus,or wounds not mentioned in history of present illness or chief complaint above.   Hematopoietic: Patient denies current bleeding, epistaxis, hematuria, or melena.    PHYSICAL EXAM:   Vitals:  There were no vitals filed for this visit.    General:  48 y.o. male who appears about stated age.    Alert, cooperative, in no acute distress                       Head:    Normocephalic, without obvious abnormality, atraumatic   Eyes:            Lids and lashes normal, conjunctivae and sclerae normal, no         icterus, no pallor, corneas clear, PERRLA   Ears:    Ears appear intact with no abnormalities noted   Throat:   No oral lesions, no thrush, oral mucosa moist   Neck:   No adenopathy, supple, trachea midline, no JVD   Back:     Limited exam shows no severe kyphosis present,no visible           erythema, no excessive  tenderness to palpation.    Lungs:     Respirations regular, even and unlabored.     Heart:    Normal rate, Pulses palpable   Chest Wall:    No abnormalities observed.   Abdomen:     Normal bowel sounds, no masses, no organomegaly, soft              non-tender, non-distended, no guarding, no rebound                      tenderness   Rectal:     Deferred   Pulses:   Pulses palpable and equal bilaterally   Skin:   No bleeding, bruising or rash   Lymph nodes:   No palpable adenopathy   Extremities:     Left hip skin intact. Painful ROM.  NVI distally.      DIAGNOSTIC TEST:  No visits with results within 2 Day(s) from this visit.  Latest known visit with results is:    Appointment on 02/15/2018   Component Date Value Ref Range Status   • WBC 02/15/2018 10.33  4.50 - 10.70 10*3/mm3 Final   • RBC 02/15/2018 5.13  4.60 - 6.00 10*6/mm3 Final   • Hemoglobin 02/15/2018 16.2  13.7 - 17.6 g/dL Final   • Hematocrit 02/15/2018 46.1  40.4 - 52.2 % Final   • MCV 02/15/2018 89.9  79.8 - 96.2 fL Final   • MCH 02/15/2018 31.6  27.0 - 32.7 pg  Final   • MCHC 02/15/2018 35.1  32.6 - 36.4 g/dL Final   • RDW 02/15/2018 12.3  11.5 - 14.5 % Final   • RDW-SD 02/15/2018 40.1  37.0 - 54.0 fl Final   • MPV 02/15/2018 10.4  6.0 - 12.0 fL Final   • Platelets 02/15/2018 257  140 - 500 10*3/mm3 Final   • Glucose 02/15/2018 233* 65 - 99 mg/dL Final   • BUN 02/15/2018 17  6 - 20 mg/dL Final   • Creatinine 02/15/2018 0.79  0.76 - 1.27 mg/dL Final   • Sodium 02/15/2018 135* 136 - 145 mmol/L Final   • Potassium 02/15/2018 3.8  3.5 - 5.2 mmol/L Final   • Chloride 02/15/2018 93* 98 - 107 mmol/L Final   • CO2 02/15/2018 29.5* 22.0 - 29.0 mmol/L Final   • Calcium 02/15/2018 10.2  8.6 - 10.5 mg/dL Final   • eGFR Non African Amer 02/15/2018 104  >60 mL/min/1.73 Final   • BUN/Creatinine Ratio 02/15/2018 21.5  7.0 - 25.0 Final   • Anion Gap 02/15/2018 12.5  mmol/L Final   • Hemoglobin A1C 02/15/2018 7.11* 4.80 - 5.60 % Final   • Color, UA 02/15/2018 Yellow  Yellow, Straw Final   • Appearance, UA 02/15/2018 Clear  Clear Final   • pH, UA 02/15/2018 <=5.0  5.0 - 8.0 Final   • Specific Gravity, UA 02/15/2018 1.023  1.005 - 1.030 Final   • Glucose, UA 02/15/2018 250 mg/dL (1+)* Negative Final   • Ketones, UA 02/15/2018 Negative  Negative Final   • Bilirubin, UA 02/15/2018 Negative  Negative Final   • Blood, UA 02/15/2018 Negative  Negative Final   • Protein, UA 02/15/2018 Negative  Negative Final   • Leuk Esterase, UA 02/15/2018 Negative  Negative Final   • Nitrite, UA 02/15/2018 Negative  Negative Final   • Urobilinogen, UA 02/15/2018 0.2 E.U./dL  0.2 - 1.0 E.U./dL Final       Xr Chest Pa & Lateral    Result Date: 2/15/2018  Narrative: PA AND LATERAL CHEST X-RAY, PELVIS AND LEFT HIP X-RAY.  HISTORY: Left hip pain, preop. Hypertension.  TECHNIQUE: PA and lateral images of the chest, an AP view of the pelvis, and 2 views of the left hip are provided.  COMPARISON: There is no prior exam for comparison.  FINDINGS: Chest x-ray shows a normal cardiomediastinal silhouette with clear lungs.  There is a spinal cord stimulator device projecting over the lower thoracic spine. The bones of the chest appear normal.  The electronic stimulator device projects over the right pelvis. There is also right hip arthroplasty hardware, partially demonstrated. The SI joints and symphysis pubis and the bones of the pelvis appear normal. At the left hip, the radiographic joint space appears normal superiorly. There is no bone lesion or osseous deformity appreciated around the left hip.      Impression: 1. Negative chest x-ray. 2. No abnormality is identified at the left hip or pelvis on x-ray.  This report was finalized on 2/15/2018 9:05 PM by Dr. Jairon Mirza MD.      Xr Hip With Or Without Pelvis 2 - 3 View Left    Result Date: 2/15/2018  Narrative: PA AND LATERAL CHEST X-RAY, PELVIS AND LEFT HIP X-RAY.  HISTORY: Left hip pain, preop. Hypertension.  TECHNIQUE: PA and lateral images of the chest, an AP view of the pelvis, and 2 views of the left hip are provided.  COMPARISON: There is no prior exam for comparison.  FINDINGS: Chest x-ray shows a normal cardiomediastinal silhouette with clear lungs. There is a spinal cord stimulator device projecting over the lower thoracic spine. The bones of the chest appear normal.  The electronic stimulator device projects over the right pelvis. There is also right hip arthroplasty hardware, partially demonstrated. The SI joints and symphysis pubis and the bones of the pelvis appear normal. At the left hip, the radiographic joint space appears normal superiorly. There is no bone lesion or osseous deformity appreciated around the left hip.      Impression: 1. Negative chest x-ray. 2. No abnormality is identified at the left hip or pelvis on x-ray.  This report was finalized on 2/15/2018 9:05 PM by Dr. Jairon Mirza MD.          ASSESSMENT:  Left hip OA  Patient Active Problem List   Diagnosis   • Osteoarthritis of left hip       PLAN:    Left AYDEN, anterior approach.    Risks and  benefits of surgical intervention were discussed in detail with the patient.  Risks of infection, fracture, dislocation, extremity length discrepancy, neurovascular injury, persistent pain, medical risks, anesthetic risk, need for additional surgery, deep venous thrombosis, pulmonary embolism and death.      The above diagnosis and treatment plan was discussed with the patient and/or family.  They were educated in both non-surgical and surgical treatment options for their condition.   They were given the opportunity to ask questions and were answered to their satisfaction.  They agreed to proceed with the above treatment plan.        Dameon Briggs MD  Date: 2/21/2018

## 2018-02-21 NOTE — ANESTHESIA PROCEDURE NOTES
Airway  Urgency: elective    Date/Time: 2/21/2018 10:24 AM  Airway not difficult    General Information and Staff    Patient location during procedure: OR  Anesthesiologist: MACK BEAVER  CRNA: RICH YANG    Indications and Patient Condition  Indications for airway management: airway protection    Preoxygenated: yes  MILS not maintained throughout  Mask difficulty assessment: 1 - vent by mask    Final Airway Details  Final airway type: endotracheal airway      Successful airway: ETT  Cuffed: yes   Successful intubation technique: direct laryngoscopy  Facilitating devices/methods: intubating stylet and cricoid pressure  Endotracheal tube insertion site: oral  Blade: Fariba  Blade size: #3  ETT size: 8.0 mm  Cormack-Lehane Classification: grade I - full view of glottis  Placement verified by: chest auscultation and capnometry   Measured from: lips  ETT to lips (cm): 22  Number of attempts at approach: 1    Additional Comments  Top front left tooth chipped in preop- discussed with patient. PreO2 100%, FEO2 >85, SIVI, easy BMV, sniff position, ETT placed/ confirmed, attraumatic, teeth and lips as preop.

## 2018-02-21 NOTE — PLAN OF CARE
Problem: Patient Care Overview (Adult)  Goal: Plan of Care Review  Outcome: Ongoing (interventions implemented as appropriate)   02/21/18 0938   Coping/Psychosocial Response Interventions   Plan Of Care Reviewed With patient   Patient Care Overview   Progress progress toward functional goals as expected     Goal: Adult Individualization and Mutuality  Outcome: Ongoing (interventions implemented as appropriate)   02/21/18 0938   Individualization   Patient Specific Preferences pt goes by Robby     Goal: Discharge Needs Assessment  Outcome: Ongoing (interventions implemented as appropriate)   02/21/18 0926 02/21/18 0938   Discharge Needs Assessment   Concerns To Be Addressed --  denies needs/concerns at this time   Living Environment   Transportation Available car;family or friend will provide --    Self-Care   Equipment Currently Used at Home cane, straight;glucometer --        Problem: Perioperative Period (Adult)  Goal: Signs and Symptoms of Listed Potential Problems Will be Absent or Manageable (Perioperative Period)  Outcome: Ongoing (interventions implemented as appropriate)   02/21/18 0938   Perioperative Period   Problems Assessed (Perioperative Period) pain;infection;perioperative injury   Problems Present (Perioperative Period) other (see comments);pain  (cat scratches present)

## 2018-02-21 NOTE — OP NOTE
TOTAL HIP ARTHROPLASTY ANTERIOR WITH HANA TABLE  Procedure Note    Robby Hernandes  2/21/2018    Pre-op Diagnosis: Left Hip Avascular necrosis  Post-op Diagnosis: Same  Procedure:  Anterior approach Left  Total Hip Arthroplasty  Anesthesia: General, Anesthesiologist: Diaz Villa MD  CRNA: Anita Hayes CRNA  Staff: Circulator: Claribel Espinoza RN; Umer Hickman RN  Radiology Technologist: Bennie Muse, FINN  Scrub Person: Kayy Powell; Jess Slater  Vendor Representative: Dayton Mahan  Orderly: Greyson Mcadams  Assistant: Jairon Manzanares DO  Estimated Blood Loss:200 mL   Specimens: * No orders in the log *  Drains: none  Complications: None    Components Utilized:   Alie   Implant Name Type Inv. Item Serial No.  Lot No. LRB No. Used   PLUG DOME HL - [id number] Implant PLUG DOME HL  [employer] [id number] Left 1   SCRW ACET MADI TRILOGY S/TAP 6.5X30 - [id number] Implant SCRW ACET MADI TRILOGY S/TAP 6.5X30  [employer] [id number] Left 1   LINER ACET VIT E CRS/LNK ELEV [id number] KK - [id number] Implant LINER ACET VIT E CRS/LNK ELEV [id number]  [employer] [id number] Left 1   SHLL ACET CONTINUUM CLUST/HL SZKK 56 - [id number] Implant SHLL ACET CONTINUUM CLUST/HL SZKK 56  [employer] [id number] Left 1   SCRW ACET MADI TRILOGY S/TAP 6.5X30 - [id number] Implant SCRW ACET MADI TRILOGY S/TAP 6.5X30  [employer] [id number] Left 1   STEM FEM TPR STD OFFST SZ12.5 - [id number] Implant STEM FEM TPR STD OFFST SZ12.5  [employer] [id number] Left 1   HD FEM BIOLOXDELTA 12/14 36 MIN3.5 - [id number] Implant HD FEM BIOLOXDELTA 12/14 36 MIN3.5   [employer] [id number] Left 1       Indication for Procedure:   The patient is a 48 y.o. male presents today for total hip arthroplasty  procedure because of failure to conservatively manage the patients pain. The patient was educated in risks of surgery that could include possible risk of infection, deep venous thrombosis, pulmonary embolism, fracture, neurovascular  injury, leg length discrepancy, dislocation, possible persistent pain, need for additional surgeries, anesthetic risks, medical risks including heart attack and stroke, and death.  The discussion occurred in the office pre-operatively, and patient had the opportunity to ask questions, and concerns about the proposed surgery.  The patient also understood that medicine is not an exact science, and that outcomes of the surgical procedure may be less than desired. The patient wished to proceed.      Protocols for intravenous antibiotics and venous thrombosis were followed for this patient.  IV antibiotics were infused prior to surgery and will be discontinued within 24 hours of completion of the surgical procedure.  Thrombosis prophylaxis will be initiated within 24 hours of the completion of the surgical procedure.      Procedure:   After the patient was identified in the preoperative area, and the surgical site confirmed and marked, the patient was brought to the operating room on a stretcher.  The above anesthetic was placed uneventfully on the stretcher and the patient was transferred to the [facility] operating room table.  A time-out procedure was performed.  The intravenous antibiotics infusion was completed. The operative leg was then prepped and draped in usual sterile fashion.     A 10 blade scalpel was then used to make an anterior based incision over the operative hip.  The tensor fascia arash fascia was opened longitudinally and the Tensor fascia aarsh was mobilized laterally and sartorius muscle medially.  The anterior hip capsule was then opened and excised.  The femoral neck osteotomy was completed with a reciprocating saw.  The acetabular labrum was excised and the pulvinar was removed.  The femoral head was measured, and acetabular reaming commenced 2 mm smaller than the measured femoral head size. Reaming was performed under C-arm guidance and was used to ream to the medial wall and base of teardrop.  Reaming  continued until concentric reaming was performed and good back bleeding was visualized in the floor of the acetabulum.  Then the above Continuum cup was impacted, again under C-arm guidance until it was well seated.  Two screws were placed superiorly after drilling and measuring for correct length into the superior column.  The dome hole plug was then placed into the acetabular component.  Then the acetabular liner was placed with the elevated liner placed posteriorly and was impacted.  It was confirmed to be well seated.     Then the femoral retractors were placed and the piriformis and posterior capsule was released.  The femur was subluxed anterior and the  was used to open up the intramedullary canal.  The rattail rasp was inserted to further lateralize the medullary canal.  Then the starter broach was inserted, and sequential larger sizes were used until a tight fit was palpated and cortical chatter was heard.  Trial neck and head balls were placed and the hip was reduced. The hip was checked for stability both anterior and posteriorly and laterally using a bone hook. C-arm was used to confirm correct implant position and size and leg length.  The trial implants were removed and the final implants were impacted into place.  The hip was reduced and was copiously irrigated with a 3 liter mixture of betadine and normal saline.  The wound was closed in layers with 0 Vicryl used to close the TFL fascia, 2-0 Vicryl to close the deep dermis, and 3-0 monocryl to close the skin.  Skin glue was applied and sterile dressings were placed.    Sponge and needle counts were completed and were found to be correct.  The patient was awakened from the anesthetic and was brought into the recovery room in stable condition.      Dameon Briggs MD     Date: 2/21/2018  Time: 12:15 PM

## 2018-02-21 NOTE — PERIOPERATIVE NURSING NOTE
Dr. Briggs notified in the OR about pt's cat scratches bilateral arms, right arm redness noted around puncture from cat claw and pustule noted. Also aware of cat scratch on upper abdomen that is also red.

## 2018-02-21 NOTE — PLAN OF CARE
Problem: Patient Care Overview (Adult)  Goal: Plan of Care Review   02/21/18 1553   Coping/Psychosocial Response Interventions   Plan Of Care Reviewed With patient   Outcome Evaluation   Outcome Summary/Follow up Plan Patient is a pleasant 48 y.o. male POD0 L AYDEN-anterior with expected post op weakness and impaired funct mobility. Patient is ind with all ADLs at baseline and uses a straight cane occasionally. RW and dylon BSC upon request ordered 2/21. All mobility required assist x 1. Ambulated 85' with RW and SBA. Distance limited 2' to pain. Patient will benefit from skilled PT services acutely to address deficits as able and improve level of independence. Anticipate DC home Thursday with  PT to follow.       Problem: Inpatient Physical Therapy  Goal: Bed Mobility Goal LTG- PT   02/21/18 1553   Bed Mobility PT LTG   Bed Mobility PT LTG, Date Established 02/21/18   Bed Mobility PT LTG, Time to Achieve 1 wk   Bed Mobility PT LTG, Activity Type all bed mobility   Bed Mobility PT LTG, [place] Level supervision required     Goal: Transfer Training Goal 1 LTG- PT   02/21/18 1553   Transfer Training PT LTG   Transfer Training PT LTG, Date Established 02/21/18   Transfer Training PT LTG, Time to Achieve 1 wk   Transfer Training PT LTG, Activity Type all transfers   Transfer Training PT LTG, [place] Level supervision required   Transfer Training PT LTG, Assist Device walker, rolling     Goal: Gait Training Goal LTG- PT   02/21/18 1553   Gait Training PT LTG   Gait Training Goal PT LTG, Date Established 02/21/18   Gait Training Goal PT LTG, Time to Achieve 1 wk   Gait Training Goal PT LTG, [place] Level supervision required   Gait Training Goal PT LTG, Assist Device walker, rolling   Gait Training Goal PT LTG, Distance to Achieve 150     Goal: Stair Training Goal LTG- PT   02/21/18 1553   Stair Training PT LTG   Stair Training Goal PT LTG, Date Established 02/21/18   Stair Training Goal PT LTG, Time to  Achieve 1 wk   Stair Training Goal PT LTG, Number of Steps 4   Stair Training Goal PT LTG, [place] Level contact guard assist   Stair Training Goal PT LTG, Assist Device 1 handrail

## 2018-02-21 NOTE — THERAPY EVALUATION
Acute Care - Physical Therapy Initial Evaluation  [facility]     Patient Name: Robby Hernandes  : 1970  MRN: [id number]  Today's Date: 2018   Onset of Illness/Injury or Date of Surgery Date: 18 (L AYDEN-anterior)  Date of Referral to PT: 18  Referring Physician: Brigitte      Admit Date: 2018     Visit Dx:    ICD-10-CM ICD-9-CM   1. Status post total replacement of left hip Z96.642 V43.64     Patient Active Problem List   Diagnosis   • Osteoarthritis of left hip     Past Medical History:   Diagnosis Date   • Anxiety and depression    • Asthma    • Chronic knee pain     RIGHT   • Complex regional pain syndrome     LEFT ANKLE   • COPD (chronic obstructive pulmonary disease)    • Diabetes mellitus    • Fracture of nasal septum 1987    CAN NOT BREATH OUT OF RIGHT NOSTRIL   • Hiatal hernia    • High cholesterol    • Hip pain, chronic, left    • History of heart attack     PER EKG DR MILIAN   • History of kidney stones    • [place] (hard of hearing)    • Hypertension    • Insomnia    • Low back pain    • Shoulder pain, left    • Sleep apnea     DOES NOT USE MACHINE   • Tinnitus      Past Surgical History:   Procedure Laterality Date   • ANKLE FUSION Left    • CARPAL TUNNEL RELEASE Right    • CYSTOSCOPY BLADDER STONE LITHOTRIPSY     • SPINAL CORD STIMULATOR IMPLANT      battery is on the right   • TOTAL HIP ARTHROPLASTY Right           PT ASSESSMENT (last 72 hours)      PT Evaluation       18 1500 18 0926    Rehab Evaluation    Document Type evaluation  -MA     Subjective Information agree to therapy;complains of;pain  -MA     Patient Effort, Rehab Treatment good  -MA     Symptoms Noted During/After Treatment increased pain  -MA     General Information    Patient Profile Review yes  -MA     Onset of Illness/Injury or Date of Surgery Date 18   L AYDEN-anterior  -MA     Referring Physician Brigitte  -MA     General Observations supine in bed with HOB elevated, SCD applied, ice applied, o2  donned, no acute distress noted at rest, RN at bedside  -MA     Pertinent History Of Current Problem POD0 L AYDEN-anterior  -MA     Precautions/Limitations anterior hip precautions- left;fall precautions  -MA     Prior Level of Function independent:;all household mobility  -MA     Equipment Currently Used at Home  cane, straight;glucometer  -JV    Plans/Goals Discussed With patient  -MA     Risks Reviewed patient:  -MA     Benefits Reviewed patient:  -MA     Barriers to Rehab none identified  -MA     Living Environment    Lives With  alone  -JV    Living Arrangements  house  -JV    Home Accessibility  no concerns;bed and bath on same level  -JV    Transportation Available  car;family or friend will provide  -JV    Clinical Impression    Date of Referral to PT 02/21/18  -MA     PT Diagnosis impaired funct mobility 2' to post op  -MA     Criteria for Skilled Therapeutic Interventions Met yes;treatment indicated  -MA     Pathology/Pathophysiology Noted (Describe Specifically for Each System) musculoskeletal  -MA     Impairments Found (describe specific impairments) aerobic capacity/endurance;gait, locomotion, and balance;ROM  -MA     Rehab Potential good, to achieve stated therapy goals  -MA     Vital Signs    Pre SpO2 (%) 98  -MA     O2 Delivery Pre Treatment supplemental O2  -MA     Post SpO2 (%) 96  -MA     O2 Delivery Post Treatment supplemental O2  -MA     Pain Assessment    Pain Assessment 0-10  -MA     Pain Score 5  -MA     Pain Type Surgical pain  -MA     Pain Location Hip  -MA     Pain Orientation Left  -MA     Pain Intervention(s) Cold applied;Repositioned;Ambulation/increased activity;Rest  -MA     Response to Interventions tolerated  -MA     Vision Assessment/Intervention    Visual Impairment WFL  -MA     Cognitive Assessment/Intervention    Current Cognitive/Communication Assessment functional  -MA     Orientation Status oriented x 4  -MA     Follows Commands/Answers Questions 100% of the time;able to follow  multi-step instructions  -MA     Personal Safety WNL/WFL;good awareness, safety precautions  -MA     Personal Safety Interventions fall prevention program maintained;gait belt;nonskid shoes/slippers when out of bed  -MA     ROM (Range of Motion)    General ROM Detail L LE Limited 2' to pain  -MA     MMT (Manual Muscle Testing)    General MMT Assessment Detail L LE post op weakness  -MA     Mobility Assessment/Training    Extremity Weight-Bearing Status left lower extremity  -MA     Left Lower Extremity Weight-Bearing weight-bearing as tolerated  -MA     Bed Mobility, Assessment/Treatment    Bed Mobility, Assistive Device bed rails;head of bed elevated  -MA     Bed Mob, Supine to Sit, [place] minimum assist (75% patient effort)  -MA     Bed Mob, Sit to Supine, [place] not tested  -MA     Bed Mobility, Impairments pain  -MA     Bed Mobility, Comment Assist with L LE  -MA     Transfer Assessment/Treatment    Transfers, Sit-Stand [place] contact guard assist  -MA     Transfers, Stand-Sit [place] contact guard assist  -MA     Transfers, Sit-Stand-Sit, Assist Device rolling walker  -MA     Transfer, Safety Issues sequencing ability decreased;step length decreased;weight-shifting ability decreased  -MA     Transfer, Impairments pain;ROM decreased;strength decreased  -MA     Transfer, Comment Cues for hand placement to improve safety  -MA     Gait Assessment/Treatment    Gait, [place] Level contact guard assist  -MA     Gait, Assistive Device rolling walker  -MA     Gait, Distance (Feet) 85  -MA     Gait, Gait Pattern Analysis 3-point gait  -MA     Gait, Gait Deviations left:;antalgic;step length decreased;stride length decreased;ben decreased;decreased heel strike  -MA     Gait, Safety Issues sequencing ability decreased;step length decreased;weight-shifting ability decreased  -MA     Gait, Impairments pain;ROM decreased;strength decreased;impaired balance  -MA     Gait, Comment Distance  limited 2' to pain, cues for sequencing  -MA     Therapy Exercises    Exercise Protocols total hip  -MA     Total Hip Exercises left:;10 reps;completed protocol   assist with hip ABD  -MA     Positioning and Restraints    Pre-Treatment Position in bed  -MA     Post Treatment Position chair  -MA     In Chair notified nsg;sitting;legs elevated;encouraged to call for assist   ice applied, notified RN regarding no call light  -MA       User Key  (r) = Recorded By, (t) = Taken By, (c) = Cosigned By    Initials Name Provider Type    SHEMAR Chavez, RN Registered Nurse    EMILY Boyer PT Physical Therapist          Physical Therapy Education     Title: PT OT SLP Therapies (Active)     Topic: Physical Therapy (Active)     Point: Mobility training (Active)    Learning Progress Summary    Learner Readiness Method Response Comment Documented by Status   Patient Acceptance E NR  MA 02/21/18 1556 Active               Point: Home exercise program (Active)    Learning Progress Summary    Learner Readiness Method Response Comment Documented by Status   Patient Acceptance E NR  MA 02/21/18 1556 Active               Point: Body mechanics (Active)    Learning Progress Summary    Learner Readiness Method Response Comment Documented by Status   Patient Acceptance E NR  MA 02/21/18 1556 Active               Point: Precautions (Active)    Learning Progress Summary    Learner Readiness Method Response Comment Documented by Status   Patient Acceptance E NR  MA 02/21/18 1556 Active                      User Key     Initials Effective Dates Name Provider Type Discipline    MA 12/13/16 -  Emilie Boyer PT Physical Therapist PT                PT Recommendation and Plan  Anticipated Equipment Needs At Discharge: bedside commode, front wheeled walker (PT ordered 2/21- requested [facility])  Anticipated Discharge Disposition: home with assist, home with home health  Planned Therapy Interventions: balance training, bed  mobility training, gait training, home exercise program, patient/family education, postural re-education, strengthening, transfer training, ROM (Range of Motion), stair training  PT Frequency: 2 times/day  Plan of Care Review  Plan Of Care Reviewed With: patient  Outcome Summary/Follow up Plan: Patient is a pleasant 48 y.o. male POD0 L AYDEN-anterior with expected post op weakness and impaired funct mobility. Patient is ind with all ADLs at baseline and uses a straight cane occasionally. RW and dylon BSC upon request ordered 2/21. All mobility required assist x 1. Ambulated 85' with RW and SBA. Distance limited 2' to pain. Patient will benefit from skilled PT services acutely to address deficits as able and improve level of independence. Anticipate DC home Thursday with  PT to follow.          IP PT Goals       02/21/18 1553          Bed Mobility PT LTG    Bed Mobility PT LTG, Date Established 02/21/18  -MA      Bed Mobility PT LTG, Time to Achieve 1 wk  -MA      Bed Mobility PT LTG, Activity Type all bed mobility  -MA      Bed Mobility PT LTG, [place] Level supervision required  -MA      Transfer Training PT LTG    Transfer Training PT LTG, Date Established 02/21/18  -MA      Transfer Training PT LTG, Time to Achieve 1 wk  -MA      Transfer Training PT LTG, Activity Type all transfers  -MA      Transfer Training PT LTG, [place] Level supervision required  -MA      Transfer Training PT LTG, Assist Device walker, rolling  -MA      Gait Training PT LTG    Gait Training Goal PT LTG, Date Established 02/21/18  -MA      Gait Training Goal PT LTG, Time to Achieve 1 wk  -MA      Gait Training Goal PT LTG, [place] Level supervision required  -MA      Gait Training Goal PT LTG, Assist Device walker, rolling  -MA      Gait Training Goal PT LTG, Distance to Achieve 150  -MA      Stair Training PT LTG    Stair Training Goal PT LTG, Date Established 02/21/18  -MA      Stair Training Goal PT LTG, Time to Achieve 1  wk  -MA      Stair Training Goal PT LTG, Number of Steps 4  -MA      Stair Training Goal PT LTG, [place] Level contact guard assist  -MA      Stair Training Goal PT LTG, Assist Device 1 handrail  -MA        User Key  (r) = Recorded By, (t) = Taken By, (c) = Cosigned By    Initials Name Provider Type    EMILY Boyer PT Physical Therapist                Outcome Measures       02/21/18 1500          How much help from another person do you currently need...    Turning from your back to your side while in flat bed without using bedrails? 3  -MA      Moving from lying on back to sitting on the side of a flat bed without bedrails? 3  -MA      Moving to and from a bed to a chair (including a wheelchair)? 3  -MA      Standing up from a chair using your arms (e.g., wheelchair, bedside chair)? 3  -MA      Climbing 3-5 steps with a railing? 2  -MA      To walk in hospital room? 3  -MA      AM-PAC 6 Clicks Score 17  -MA      Functional Assessment    Outcome Measure Options AM-PAC 6 Clicks Basic Mobility (PT)  -MA        User Key  (r) = Recorded By, (t) = Taken By, (c) = Cosigned By    Initials Name Provider Type    EMILY Boyer PT Physical Therapist           Time Calculation:         PT Charges       02/21/18 1548          Time Calculation    Start Time 1520  -MA      Stop Time 1548  -MA      Time Calculation (min) 28 min  -MA      PT Received On 02/21/18  -MA      PT - Next Appointment 02/22/18  -MA      PT Goal Re-Cert Due Date 02/28/18  -MA        User Key  (r) = Recorded By, (t) = Taken By, (c) = Cosigned By    Initials Name Provider Type    EMILY Boyer PT Physical Therapist          Therapy Charges for Today     Code Description Service Date Service Provider Modifiers Qty    70382782334 HC PT EVAL MOD COMPLEXITY 2 2/21/2018 Emilie Boyer, PT GP 1    98744548233 HC PT THER PROC EA 15 MIN 2/21/2018 Emilie Boyer, PT GP 1          PT G-Codes  Outcome Measure Options: AM-PAC 6  Clicks Basic Mobility (PT)      Emilie Boyer, PT  2/21/2018

## 2018-02-21 NOTE — ANESTHESIA PREPROCEDURE EVALUATION
Anesthesia Evaluation     Patient summary reviewed and Nursing notes reviewed                Airway   Mallampati: III  no difficulty expected  Dental      Pulmonary    (+) a smoker Former, COPD, asthma, sleep apnea,   Cardiovascular     ECG reviewed  Rhythm: regular    (+) hypertension, hyperlipidemia      Neuro/Psych  (+) numbness, psychiatric history Anxiety and Depression,     GI/Hepatic/Renal/Endo    (+)  renal disease stones, diabetes mellitus type 2,     Musculoskeletal (-) negative ROS    Abdominal    Substance History - negative use     OB/GYN negative ob/gyn ROS         Other                      Anesthesia Plan    ASA 3     general   (Heart cath in 2016 was negative per patient  Spinal cord stimulator for regional pain syndrome of left ankle)  intravenous induction   Anesthetic plan and risks discussed with patient.

## 2018-02-21 NOTE — ANESTHESIA POSTPROCEDURE EVALUATION
"Patient: Robby Hernandes    Procedure Summary     Date Anesthesia Start Anesthesia Stop Room / Location    02/21/18 1014 1244  [facility] / [facility]       Procedure Diagnosis Surgeon Provider    LT TOTAL HIP ARTHROPLASTY ANTERIOR WITH HANA TABLE (Left Hip) No diagnosis on file. MD Diaz Witt MD          Anesthesia Type: general  Last vitals  BP   145/89 (02/21/18 1320)   Temp   36.8 °C (98.2 °F) (02/21/18 1239)   Pulse   105 (02/21/18 1305)   Resp   16 (02/21/18 1320)     SpO2   96 % (02/21/18 1320)     Post Anesthesia Care and Evaluation    Patient location during evaluation: bedside  Patient participation: complete - patient participated  Level of consciousness: awake and alert  Pain management: adequate  Airway patency: patent  Anesthetic complications: No anesthetic complications    Cardiovascular status: acceptable  Respiratory status: acceptable  Hydration status: acceptable    Comments: /89  Pulse 105  Temp 36.8 °C (98.2 °F) (Oral)   Resp 16  Ht 177.8 cm (70\")  Wt 117 kg (257 lb 14.4 oz)  SpO2 96%  BMI 37 kg/m2          "

## 2018-02-21 NOTE — PLAN OF CARE
Problem: Patient Care Overview (Adult)  Goal: Plan of Care Review  Outcome: Ongoing (interventions implemented as appropriate)   02/21/18 8731   Coping/Psychosocial Response Interventions   Plan Of Care Reviewed With patient   Patient Care Overview   Progress improving   Outcome Evaluation   Outcome Summary/Follow up Plan VSS. BP elevated in PACU. Hydralazine given x 2. no s/s of HTN since arriving to nursing unit. PT eval performed at bedside. up to chair with PT. voiding per urinal without difficulty. Oxy initiated for pain. left hip dressing CDI with no s/s of infection. [facility] consulted for elevated BP and HR post-op and diabetes management. possible discharge in AM if medically stable.       Problem: Fall Risk (Adult)  Goal: Identify Related Risk Factors and Signs and Symptoms  Outcome: Outcome(s) achieved Date Met: 02/21/18    Goal: Absence of Falls  Outcome: Ongoing (interventions implemented as appropriate)      Problem: Hip Replacement, Total (Adult)  Goal: Signs and Symptoms of Listed Potential Problems Will be Absent or Manageable (Hip Replacement, Total)  Outcome: Ongoing (interventions implemented as appropriate)

## 2018-02-21 NOTE — DISCHARGE PLACEMENT REQUEST
"GretaDiaz thibodeaux (48 y.o. Male)     Date of Birth Social Security Number Address Home Phone MRN    1970  [id number]  [facility] [id number] [phone] [id number]    Hinduism Marital Status          Congregation Single       Admission Date Admission Type Admitting Provider Attending Provider Department, Room/Bed    2/21/18 Elective Dameon Briggs MD Rhoads, David, MD [address], P889/1    Discharge Date Discharge Disposition Discharge Destination                      Attending Provider: Dameon Briggs MD     Allergies:  Lyrica [Pregabalin], Tree Extract    Isolation:  None   Infection:  None   Code Status:  FULL    Ht:  177.8 cm (70\")   Wt:  117 kg (257 lb 14.4 oz)    Admission Cmt:  None   Principal Problem:  None                Active Insurance as of 2/21/2018     Primary Coverage     Payor Plan Insurance Group Employer/Plan Group    PASSPORT PAS[employer]ORT MEDICAID     Payor Plan Address Payor Plan Phone Number Effective From Effective To    [address] [phone] 1/1/2016     [place] [address]       Subscriber Name Subscriber Birth Date Member ID       DIAZ FARNSWORTH 1970 [id number]                 Emergency Contacts      (Rel.) Home Phone Work Phone Mobile Phone    Rui Farnsworth (Brother) [phone] -- --        "

## 2018-02-21 NOTE — CONSULTS
Inpatient Consult to Internal Medicine  Consult performed by: POOJA MCKINNEY  Consult ordered by: LATHA BRIGGS  Reason for consult: DM, HTN, COPD managment          Patient Care Team:  Wei Lauren MD as PCP - General (Family Medicine)  Perfecto Rutledge MD as Consulting Physician (Cardiology)    Chief complaint: left hip pain    Subjective     History of Present Illness    Pleasant 47 yo with history of DM, COPD, SLIM. He has had issues with left hip pain for some time. Not improved with conservative measures. He underwent total left hip today with Dr. Briggs. The procedure went well without complications. Pain is mild and currently controlled. He does have history of DM on metformin and januvia. BG high this evening in 200s though his last A1C was 7.1. Questionable history of SLIM though not sure if truly diagnosed and he does not use cpap.    Review of Systems   Constitutional: Negative.    HENT: Negative.    Eyes: Negative.    Respiratory: Negative.    Cardiovascular: Negative.    Gastrointestinal: Negative.    Endocrine: Negative.    Genitourinary: Negative.    Musculoskeletal: Positive for arthralgias and joint swelling.   Skin: Negative.    Allergic/Immunologic: Negative.    Neurological: Negative.    Hematological: Negative.    Psychiatric/Behavioral: Negative.         Past Medical History:   Diagnosis Date   • Anxiety and depression    • Asthma    • Chronic knee pain     RIGHT   • Complex regional pain syndrome     LEFT ANKLE   • COPD (chronic obstructive pulmonary disease)    • Diabetes mellitus    • Fracture of nasal septum 1987    CAN NOT BREATH OUT OF RIGHT NOSTRIL   • Hiatal hernia    • High cholesterol    • Hip pain, chronic, left    • History of heart attack     PER EKG DR RUTLEDGE   • History of kidney stones    • [place] (hard of hearing)    • Hypertension    • Insomnia    • Low back pain    • Shoulder pain, left    • Sleep apnea     DOES NOT USE MACHINE   • Tinnitus    ,   Past Surgical  History:   Procedure Laterality Date   • ANKLE FUSION Left    • CARPAL TUNNEL RELEASE Right    • CYSTOSCOPY BLADDER STONE LITHOTRIPSY     • SPINAL CORD STIMULATOR IMPLANT      battery is on the right   • TOTAL HIP ARTHROPLASTY Right    ,   Family History   Problem Relation Age of Onset   • Arthritis Mother    • Malig Hyperthermia Neg Hx    ,   Social History   Substance Use Topics   • Smoking status: Former Smoker     Packs/day: 2.00     Years: 10.00     Types: Cigarettes     Quit date: 2008   • Smokeless tobacco: Never Used   • Alcohol use 9.0 oz/week     15 Cans of beer per week   ,   Prescriptions Prior to Admission   Medication Sig Dispense Refill Last Dose   • hydrochlorothiazide (HYDRODIURIL) 12.5 MG tablet Take 12.5 mg by mouth Daily.   2/20/2018 at 0800   • metFORMIN (GLUCOPHAGE) 500 MG tablet Take 1,000 mg by mouth 2 (Two) Times a Day With Meals.   2/20/2018 at 2000   • pravastatin (PRAVACHOL) 40 MG tablet Take 60 mg by mouth Every Night.   2/20/2018 at 2000   • SITagliptin (JANUVIA) 100 MG tablet Take 100 mg by mouth Daily.   2/20/2018 at 0800   • venlafaxine (EFFEXOR) 75 MG tablet Take 75 mg by mouth Daily.   2/20/2018 at 0800   , Scheduled Meds:      atorvastatin 10 mg Oral Daily   ceFAZolin 2 g Intravenous Q8H   chlordiazePOXIDE 25 mg Oral Nightly   [START ON 2/22/2018] enoxaparin 40 mg Subcutaneous Daily   famotidine 40 mg Oral Daily   hydrochlorothiazide 12.5 mg Oral Daily   insulin aspart 0-7 Units Subcutaneous 4x Daily With Meals & Nightly   linagliptin 5 mg Oral Daily   metFORMIN 1,000 mg Oral BID With Meals   sennosides-docusate sodium 2 tablet Oral BID   venlafaxine 75 mg Oral Daily   , Continuous Infusions:      sodium chloride 0.9 % with KCl 20 mEq 100 mL/hr Last Rate: 100 mL/hr (02/21/18 1610)   , PRN Meds:  •  acetaminophen  •  bisacodyl  •  dextrose  •  dextrose  •  diazePAM  •  glucagon (human recombinant)  •  HYDROmorphone **AND** naloxone  •  ketorolac  •  melatonin  •  ondansetron **OR**  ondansetron ODT **OR** ondansetron  •  oxyCODONE  •  oxyCODONE-acetaminophen  •  sodium chloride and Allergies:  Lyrica [pregabalin] and Tree extract    Objective      Vital Signs  Temp:  [98.1 °F (36.7 °C)-100 °F (37.8 °C)] 100 °F (37.8 °C)  Heart Rate:  [] 114  Resp:  [16-20] 16  BP: (133-196)/() 155/88    Physical Exam   Constitutional: He is oriented to person, place, and time. He appears well-developed and well-nourished. No distress.   HENT:   Head: Normocephalic and atraumatic.   Mouth/Throat: Oropharynx is clear and moist.   Eyes: Conjunctivae and EOM are normal. No scleral icterus.   Neck: Normal range of motion. Neck supple. No JVD present.   Cardiovascular: Normal rate, regular rhythm and normal heart sounds.    No murmur heard.  Pulmonary/Chest: Effort normal and breath sounds normal. No respiratory distress.   Abdominal: Soft. Bowel sounds are normal. There is no tenderness.   Genitourinary:   Genitourinary Comments: Deferred    Musculoskeletal: He exhibits tenderness (post op changes to hip). He exhibits no edema.   Neurological: He is alert and oriented to person, place, and time. No cranial nerve deficit. He exhibits normal muscle tone.   Skin: Skin is warm and dry. He is not diaphoretic.   Psychiatric: He has a normal mood and affect. His behavior is normal. Thought content normal.   Nursing note and vitals reviewed.      Results Review:    I reviewed the patient's new clinical results.  CBC (No Diff) [[id number]] (Normal) Collected: 02/15/18 1416        Lab Status: Final result Specimen: Blood Updated: 02/15/18 1455        WBC 10.33 10*3/mm3         RBC 5.13 10*6/mm3         Hemoglobin 16.2 g/dL         Hematocrit 46.1 %         MCV 89.9 fL         MCH 31.6 pg         MCHC 35.1 g/dL         RDW 12.3 %         RDW-SD 40.1 fl         MPV 10.4 fL         Platelets 257 10*3/mm3        Basic Metabolic Panel [[id number]] (Abnormal) Collected: 02/15/18 1416       Lab Status: Final result  Specimen: Blood Updated: 02/15/18 1515        Glucose 233 (H) mg/dL         BUN 17 mg/dL         Creatinine 0.79 mg/dL         Sodium 135 (L) mmol/L         Potassium 3.8 mmol/L         Chloride 93 (L) mmol/L         CO2 29.5 (H) mmol/L         Calcium 10.2 mg/dL         eGFR Non African Amer 104 mL/min/1.73         BUN/Creatinine Ratio 21.5        Anion Gap 12.5 mmol/L        Narrative:         GFR Normal >60  Chronic Kidney Disease <60  Kidney Failure <15       Hemoglobin A1c [[id number]] (Abnormal) Collected: 02/15/18 1416       Lab Status: Final result Specimen: Blood Updated: 02/15/18 1504        Hemoglobin A1C 7.11 (H) %        Narrative:         Hemoglobin A1C Ranges:    Increased Risk for Diabetes  5.7% to 6.4%  Diabetes                     >= 6.5%  Diabetic Goal                < 7.0%      XR Hip 1 View Without Pelvis Left (Surgery Only) [[id number]] Not Reviewed        Order Status: Completed Collected: 02/21/18 1424        Updated: 02/21/18 1600       Narrative:         PORTABLE JOINT X-RAY     HISTORY: Left hip pain, postop arthroplasty.     Portable x-ray of the left hip is provided.     FINDINGS:  There is arthroplasty hardware, positioned as expected.  No  periprosthetic fracture is identified.  There are expected post  operative   changes in the soft tissues.          Impression:         Left hip arthroplasty as expected..     This report was finalized on 2/21/2018 3:57 PM by Dr. Jairon Mirza MD.          XR Hip With or Without Pelvis 1 View Left [[id number]] Not Reviewed       Order Status: Completed Collected: 02/21/18 1432        Updated: 02/21/18 1600       Narrative:         LEFT HIP OPERATIVE X-RAY      HISTORY: Arthroplasty.     44 seconds fluoroscopy and 2 fluoroscopic images were provided for Dr. Dameon Block operatively. Images provided show left hip arthroplasty  hardware.          Impression:         Intraoperative imaging was provided for Dr. Block during  left hip  arthroplasty.     This report was finalized on 2/21/2018 3:57 PM by Dr. Jairon Mirza MD.          FL C Arm During Surgery [[id number]] Review Not Required       Order Status: Completed Resulted: 02/21/18 1233        Updated: 02/21/18 1233       Narrative:         This procedure was auto-finalized with no dictation required.       XR Hip With or Without Pelvis 2 - 3 View Left [[id number]] Not Reviewed       Order Status: Completed Collected: 02/15/18 1719        Updated: 02/15/18 2108       Narrative:         PA AND LATERAL CHEST X-RAY, PELVIS AND LEFT HIP X-RAY.     HISTORY: Left hip pain, preop. Hypertension.     TECHNIQUE: PA and lateral images of the chest, an AP view of the pelvis,  and 2 views of the left hip are provided.      COMPARISON: There is no prior exam for comparison.     FINDINGS: Chest x-ray shows a normal cardiomediastinal silhouette with  clear lungs. There is a spinal cord stimulator device projecting over  the lower thoracic spine. The bones of the chest appear normal.     The electronic stimulator device projects over the right pelvis. There  is also right hip arthroplasty hardware, partially demonstrated. The SI  joints and symphysis pubis and the bones of the pelvis appear normal. At  the left hip, the radiographic joint space appears normal superiorly.  There is no bone lesion or osseous deformity appreciated around the left  hip.          Impression:         1. Negative chest x-ray.  2. No abnormality is identified at the left hip or pelvis on x-ray.      EKG personally viewed with sinus tach, no evidence for ischemia      Assessment/Plan     Principal Problem:    Osteoarthritis of left hip  Active Problems:    Type 2 diabetes mellitus with hyperglycemia    Hypertension    COPD (chronic obstructive pulmonary disease)    Sinus tachycardia    OA- pain control, PT, incentive spirometry and other post operative plans.  DM2- can continue home oral agents but add prn novolog for hyperglycemia  and better perioperative control while in the hospital.  HTN-continue home hctz. BMP ordered for AM  COPD- no exacerbation. Add prn duonebs  Insomnia- librium qhs. He does have some alcohol use so this will also decrease risk of any w/d.   GERD-Pepcid  Tachycardia- sinus tach without symptoms or chest pain, EKG ok, can continue to monitor, treat pain.  Possible SLIM- does not use cpap, monitor O2 and can do O2 when sleeping if sats <90%    Thank you for the consult, we will follow Mr Hernandes closely with you in the hospital.    Assessment & Plan    I discussed the patients findings and my recommendations with patient, family and nursing staff    Julian Mccoy MD  02/21/18  8:25 PM

## 2018-02-22 ENCOUNTER — APPOINTMENT (OUTPATIENT)
Dept: GENERAL RADIOLOGY | Facility: HOSPITAL | Age: 48
End: 2018-02-22
Attending: HOSPITALIST

## 2018-02-22 VITALS
HEART RATE: 124 BPM | HEIGHT: 70 IN | TEMPERATURE: 99.6 F | BODY MASS INDEX: 36.92 KG/M2 | OXYGEN SATURATION: 95 % | DIASTOLIC BLOOD PRESSURE: 100 MMHG | RESPIRATION RATE: 16 BRPM | SYSTOLIC BLOOD PRESSURE: 163 MMHG | WEIGHT: 257.9 LBS

## 2018-02-22 LAB
ANION GAP SERPL CALCULATED.3IONS-SCNC: 13.1 MMOL/L
BUN BLD-MCNC: 13 MG/DL (ref 6–20)
BUN/CREAT SERPL: 14.6 (ref 7–25)
CALCIUM SPEC-SCNC: 8.7 MG/DL (ref 8.6–10.5)
CHLORIDE SERPL-SCNC: 91 MMOL/L (ref 98–107)
CO2 SERPL-SCNC: 25.9 MMOL/L (ref 22–29)
CREAT BLD-MCNC: 0.89 MG/DL (ref 0.76–1.27)
GFR SERPL CREATININE-BSD FRML MDRD: 91 ML/MIN/1.73
GLUCOSE BLD-MCNC: 173 MG/DL (ref 65–99)
GLUCOSE BLDC GLUCOMTR-MCNC: 180 MG/DL (ref 70–130)
GLUCOSE BLDC GLUCOMTR-MCNC: 183 MG/DL (ref 70–130)
HCT VFR BLD AUTO: 38.3 % (ref 40.4–52.2)
HGB BLD-MCNC: 12.7 G/DL (ref 13.7–17.6)
POTASSIUM BLD-SCNC: 3.6 MMOL/L (ref 3.5–5.2)
SODIUM BLD-SCNC: 130 MMOL/L (ref 136–145)
TSH SERPL DL<=0.05 MIU/L-ACNC: 2.68 MIU/ML (ref 0.27–4.2)

## 2018-02-22 PROCEDURE — 63710000001 INSULIN ASPART PER 5 UNITS: Performed by: INTERNAL MEDICINE

## 2018-02-22 PROCEDURE — 97150 GROUP THERAPEUTIC PROCEDURES: CPT

## 2018-02-22 PROCEDURE — 71045 X-RAY EXAM CHEST 1 VIEW: CPT

## 2018-02-22 PROCEDURE — 84443 ASSAY THYROID STIM HORMONE: CPT | Performed by: HOSPITALIST

## 2018-02-22 PROCEDURE — 85014 HEMATOCRIT: CPT | Performed by: ORTHOPAEDIC SURGERY

## 2018-02-22 PROCEDURE — 25010000002 ENOXAPARIN PER 10 MG: Performed by: ORTHOPAEDIC SURGERY

## 2018-02-22 PROCEDURE — 25010000003 CEFAZOLIN IN DEXTROSE 2-4 GM/100ML-% SOLUTION: Performed by: ORTHOPAEDIC SURGERY

## 2018-02-22 PROCEDURE — 82962 GLUCOSE BLOOD TEST: CPT

## 2018-02-22 PROCEDURE — 80048 BASIC METABOLIC PNL TOTAL CA: CPT | Performed by: ORTHOPAEDIC SURGERY

## 2018-02-22 PROCEDURE — 97110 THERAPEUTIC EXERCISES: CPT

## 2018-02-22 PROCEDURE — 85018 HEMOGLOBIN: CPT | Performed by: ORTHOPAEDIC SURGERY

## 2018-02-22 RX ORDER — SENNA AND DOCUSATE SODIUM 50; 8.6 MG/1; MG/1
2 TABLET, FILM COATED ORAL 2 TIMES DAILY
Qty: 60 TABLET | Refills: 1 | Status: SHIPPED | OUTPATIENT
Start: 2018-02-22

## 2018-02-22 RX ORDER — METHOCARBAMOL 500 MG/1
1000 TABLET, FILM COATED ORAL 4 TIMES DAILY
Status: DISCONTINUED | OUTPATIENT
Start: 2018-02-22 | End: 2018-02-22 | Stop reason: HOSPADM

## 2018-02-22 RX ORDER — OXYCODONE AND ACETAMINOPHEN 10; 325 MG/1; MG/1
1-2 TABLET ORAL EVERY 4 HOURS PRN
Qty: 56 TABLET | Refills: 0 | Status: SHIPPED | OUTPATIENT
Start: 2018-02-22 | End: 2018-03-03

## 2018-02-22 RX ORDER — METHOCARBAMOL 500 MG/1
500-1000 TABLET, FILM COATED ORAL 4 TIMES DAILY PRN
Qty: 56 TABLET | Refills: 1 | Status: SHIPPED | OUTPATIENT
Start: 2018-02-22 | End: 2021-12-07

## 2018-02-22 RX ORDER — ASPIRIN 325 MG
325 TABLET ORAL DAILY
Qty: 42 TABLET | Refills: 0 | Status: SHIPPED | OUTPATIENT
Start: 2018-02-22 | End: 2018-04-05

## 2018-02-22 RX ADMIN — FAMOTIDINE 40 MG: 20 TABLET, FILM COATED ORAL at 07:44

## 2018-02-22 RX ADMIN — LINAGLIPTIN 5 MG: 5 TABLET, FILM COATED ORAL at 07:45

## 2018-02-22 RX ADMIN — INSULIN ASPART 2 UNITS: 100 INJECTION, SOLUTION INTRAVENOUS; SUBCUTANEOUS at 13:10

## 2018-02-22 RX ADMIN — HYDROCHLOROTHIAZIDE 12.5 MG: 25 TABLET ORAL at 07:45

## 2018-02-22 RX ADMIN — METHOCARBAMOL 1000 MG: 500 TABLET ORAL at 07:44

## 2018-02-22 RX ADMIN — CEFAZOLIN SODIUM 2 G: 2 INJECTION, SOLUTION INTRAVENOUS at 01:21

## 2018-02-22 RX ADMIN — METFORMIN HYDROCHLORIDE 1000 MG: 1000 TABLET ORAL at 07:45

## 2018-02-22 RX ADMIN — METHOCARBAMOL 1000 MG: 500 TABLET ORAL at 13:11

## 2018-02-22 RX ADMIN — ATORVASTATIN CALCIUM 10 MG: 10 TABLET, FILM COATED ORAL at 07:45

## 2018-02-22 RX ADMIN — ENOXAPARIN SODIUM 40 MG: 40 INJECTION SUBCUTANEOUS at 08:42

## 2018-02-22 RX ADMIN — INSULIN ASPART 2 UNITS: 100 INJECTION, SOLUTION INTRAVENOUS; SUBCUTANEOUS at 08:43

## 2018-02-22 RX ADMIN — OXYCODONE HYDROCHLORIDE 20 MG: 5 TABLET ORAL at 14:45

## 2018-02-22 RX ADMIN — DOCUSATE SODIUM -SENNOSIDES 2 TABLET: 50; 8.6 TABLET, COATED ORAL at 07:44

## 2018-02-22 RX ADMIN — OXYCODONE HYDROCHLORIDE 20 MG: 5 TABLET ORAL at 01:21

## 2018-02-22 RX ADMIN — VENLAFAXINE HYDROCHLORIDE 75 MG: 75 TABLET ORAL at 07:45

## 2018-02-22 NOTE — PROGRESS NOTES
Procedure(s):  LT TOTAL HIP ARTHROPLASTY ANTERIOR WITH HANA TABLE     LOS: 1 day     Subjective :   Complains of pain, moving around well.    Objective :    Vital signs in last 24 hours:  Vitals:    02/21/18 1530 02/21/18 1900 02/21/18 2238 02/22/18 0302   BP: 148/87 155/88 128/80 129/81   BP Location: Left arm Left arm Left arm Left arm   Patient Position: Lying Lying Lying Lying   Pulse: 118 114 115 108   Resp: 18 16 16 16   Temp:  100 °F (37.8 °C) 99.7 °F (37.6 °C) 99.6 °F (37.6 °C)   TempSrc:  Oral Oral Oral   SpO2: 95% 95% 95% 96%   Weight:       Height:           PHYSICAL EXAM:  Patient is calm, in no acute distress, awake and oriented x 3.  Dressing is clean, dry and intact.  No signs of infection.  Swelling is appropriate in amount.  Ecchymosis is appropriate in amount.  Homans test is negative.  Patient is neurovascularly intact distally.    LABS:    Results from last 7 days  Lab Units 02/22/18  0337 02/15/18  1416   WBC 10*3/mm3  --  10.33   HEMOGLOBIN g/dL 12.7* 16.2   HEMATOCRIT % 38.3* 46.1   PLATELETS 10*3/mm3  --  257       Results from last 7 days  Lab Units 02/22/18  0337   SODIUM mmol/L 130*   POTASSIUM mmol/L 3.6   CHLORIDE mmol/L 91*   CO2 mmol/L 25.9   BUN mg/dL 13   CREATININE mg/dL 0.89   GLUCOSE mg/dL 173*   CALCIUM mg/dL 8.7           ASSESSMENT:  Status post Procedure(s):  LT TOTAL HIP ARTHROPLASTY ANTERIOR WITH HANA TABLE      Plan:  Continue Physical Therapy, increase mobility and range of motion as tolerated.  Continue SCDs, Continue Lovenox for DVT prophylaxis while inpatient.  Dispo planning for home today.    Dameon Briggs MD    Date: 2/22/2018  Time: 6:36 AM

## 2018-02-22 NOTE — PLAN OF CARE
Problem: Patient Care Overview (Adult)  Goal: Plan of Care Review  Outcome: Ongoing (interventions implemented as appropriate)   02/22/18 0538   Coping/Psychosocial Response Interventions   Plan Of Care Reviewed With patient   Patient Care Overview   Progress improving   Outcome Evaluation   Outcome Summary/Follow up Plan VSS, pain manageable with taking the Oxy IR Q4, voiding per urinal, accu checks ordered per [facility], educated on BP and Glucose monitoring with the addition of insulin while in the hospital, plans for home with  today     Goal: Adult Individualization and Mutuality  Outcome: Ongoing (interventions implemented as appropriate)    Goal: Discharge Needs Assessment  Outcome: Ongoing (interventions implemented as appropriate)      Problem: Perioperative Period (Adult)  Goal: Signs and Symptoms of Listed Potential Problems Will be Absent or Manageable (Perioperative Period)  Outcome: Ongoing (interventions implemented as appropriate)      Problem: Fall Risk (Adult)  Goal: Absence of Falls  Outcome: Ongoing (interventions implemented as appropriate)      Problem: Hip Replacement, Total (Adult)  Goal: Signs and Symptoms of Listed Potential Problems Will be Absent or Manageable (Hip Replacement, Total)  Outcome: Ongoing (interventions implemented as appropriate)

## 2018-02-22 NOTE — PROGRESS NOTES
Continued Stay Note  [facility]     Patient Name: Robby Hernandes  MRN: [id number]  Today's Date: 2/22/2018    Admit Date: 2/21/2018          Discharge Plan       02/22/18 1619    Case Management/Social Work Plan    Plan [facility]    Patient/Family In Agreement With Plan yes    Additional Comments Spoke with pt, verified correct information on facesheet and explained the role of CCP. Pt would like to d/c home with [facility], referral given to Tanisha with [facility] who states they are able to accept. Plan will be to d/c home with [facility] and family support. No other needs identified at this time.    Final Note    Final Note Pt d/c'ed home with [facility], notified Tanisha with [facility] of d/c orders.              Discharge Codes       02/22/18 1619    Discharge Codes    Discharge Codes 06  Discharged/transferred to home under care of organized home health service organization in anticipation of skilled care        Expected Discharge Date and Time     Expected Discharge Date Expected Discharge Time    Feb 22, 2018             Arin Patel RN

## 2018-02-22 NOTE — PLAN OF CARE
Problem: Patient Care Overview (Adult)  Goal: Plan of Care Review  Outcome: Ongoing (interventions implemented as appropriate)   02/22/18 1031   Coping/Psychosocial Response Interventions   Plan Of Care Reviewed With patient   Patient Care Overview   Progress improving   Outcome Evaluation   Outcome Summary/Follow up Plan pain well controlled with PO pain medication. ambulates with asssist x1 and walker. VSS. voiding without difficulty. CXR to r/o pneumonia. educated about BP monitoring.      Goal: Adult Individualization and Mutuality  Outcome: Ongoing (interventions implemented as appropriate)    Goal: Discharge Needs Assessment  Outcome: Ongoing (interventions implemented as appropriate)      Problem: Perioperative Period (Adult)  Goal: Signs and Symptoms of Listed Potential Problems Will be Absent or Manageable (Perioperative Period)  Outcome: Outcome(s) achieved Date Met: 02/22/18      Problem: Fall Risk (Adult)  Goal: Absence of Falls  Outcome: Ongoing (interventions implemented as appropriate)      Problem: Hip Replacement, Total (Adult)  Goal: Signs and Symptoms of Listed Potential Problems Will be Absent or Manageable (Hip Replacement, Total)  Outcome: Ongoing (interventions implemented as appropriate)

## 2018-02-22 NOTE — PROGRESS NOTES
[facility]     LOS: 1 day     Name: Robby Hernandes  Age: 48 y.o.  Sex: male  :  1970  MRN: [id number]         Primary Care Physician: Wei Lauren MD    Diet Regular; Thin    Subjective   Feels OK except upper airway congestion and cough and he thinks is allergies. He reports intolerance to heat chronically    Objective   Temp:  [98.2 °F (36.8 °C)-100.4 °F (38 °C)] 100.4 °F (38 °C)  Heart Rate:  [] 112  Resp:  [16-18] 16  BP: (128-196)/() 155/85  SpO2:  [94 %-98 %] 94 %  on  Flow (L/min):  [2-4] 2;   O2 Device: room air  Body mass index is 37 kg/(m^2).    Physical Exam   Constitutional: He is oriented to person, place, and time. No distress.   Cardiovascular: Normal rate and regular rhythm.    Pulmonary/Chest: Effort normal and breath sounds normal. No respiratory distress. He has no wheezes. He has no rhonchi. He has no rales.   Abdominal: Soft. There is no tenderness.   Musculoskeletal: He exhibits no edema.   Neurological: He is alert and oriented to person, place, and time.   Skin: Skin is warm and dry.     Reviewed medications and new clinical results    atorvastatin 10 mg Oral Daily   chlordiazePOXIDE 25 mg Oral Nightly   enoxaparin 40 mg Subcutaneous Daily   famotidine 40 mg Oral Daily   hydrochlorothiazide 12.5 mg Oral Daily   insulin aspart 0-7 Units Subcutaneous 4x Daily With Meals & Nightly   linagliptin 5 mg Oral Daily   metFORMIN 1,000 mg Oral BID With Meals   methocarbamol 1,000 mg Oral 4x Daily   sennosides-docusate sodium 2 tablet Oral BID   venlafaxine 75 mg Oral Daily     sodium chloride 0.9 % with KCl 20 mEq 100 mL/hr Last Rate: Stopped (18 0300)     Results from last 7 days  Lab Units 18  0337 02/15/18  1416   WBC 10*3/mm3  --  10.33   HEMOGLOBIN g/dL 12.7* 16.2   PLATELETS 10*3/mm3  --  257     Results from last 7 days  Lab Units 18  0337 02/15/18  1416   SODIUM mmol/L 130* 135*   POTASSIUM  mmol/L 3.6 3.8   CHLORIDE mmol/L 91* 93*   CO2 mmol/L 25.9 29.5*   BUN mg/dL 13 17   CREATININE mg/dL 0.89 0.79   CALCIUM mg/dL 8.7 10.2   GLUCOSE mg/dL 173* 233*     Lab Results   Component Value Date    ANIONGAP 13.1 02/22/2018     Glucose   Date/Time Value Ref Range Status   02/22/2018 0611 183 (H) 70 - 130 mg/dL Final   02/21/2018 2042 207 (H) 70 - 130 mg/dL Final   02/21/2018 1633 229 (H) 70 - 130 mg/dL Final   02/21/2018 1246 194 (H) 70 - 130 mg/dL Final   02/21/2018 0843 136 (H) 70 - 130 mg/dL Final     Estimated Creatinine Clearance: 128.7 mL/min (by C-G formula based on Cr of 0.89).    Assessment/Plan   Active Hospital Problems (** Indicates Principal Problem)    Diagnosis Date Noted   • **Osteoarthritis of left hip [M16.12] 02/21/2018   • Type 2 diabetes mellitus with hyperglycemia [E11.65] 02/21/2018   • Hypertension [I10] 02/21/2018   • COPD (chronic obstructive pulmonary disease) [J44.9] 02/21/2018   • Sinus tachycardia [R00.0] 02/21/2018      Resolved Hospital Problems    Diagnosis Date Noted Date Resolved   No resolved problems to display.     · S/P left total hip arthroplasty 2/21/18  · lovenox for DVT prophylaxis per ortho  · Acute post op blood loss anemia not unexpected  · DM 2 control OK  · Check CXR with cough, congestion.   · Check TSH d/t heat intolerance (chronic)  · Ordered nocturnal oximetry outpatient  · I discussed the patient's findings and my recommendations with patient and nursing staff.    Manjinder Cho MD   02/22/18  9:04 AM

## 2018-02-22 NOTE — DISCHARGE SUMMARY
Discharge Summary    Date of Admission: 2/21/2018  8:31 AM    Date of Discharge:  2/22/2018    Discharge Diagnosis:   Osteoarthritis of left hip [M16.12]      PMHX:   Past Medical History:   Diagnosis Date   • Anxiety and depression    • Asthma    • Chronic knee pain     RIGHT   • Complex regional pain syndrome     LEFT ANKLE   • COPD (chronic obstructive pulmonary disease)    • Diabetes mellitus    • Fracture of nasal septum 1987    CAN NOT BREATH OUT OF RIGHT NOSTRIL   • Hiatal hernia    • High cholesterol    • Hip pain, chronic, left    • History of heart attack     PER EKG DR MILIAN   • History of kidney stones    • [place] (hard of hearing)    • Hypertension    • Insomnia    • Low back pain    • Shoulder pain, left    • Sleep apnea     DOES NOT USE MACHINE   • Tinnitus        Discharge Disposition  Home-Health Care [facility]    Procedures Performed  Procedure(s):  LT TOTAL HIP ARTHROPLASTY ANTERIOR WITH HANA TABLE       Indication for Admission  Patient is a 48 y.o. male admitted after undergoing the above surgical procedure. They were admitted for post-operative pain control, medical management and physical therapy.  They progressed with physical therapy.  [facility] medicine service was consulted for medical management.  They were deemed stable for discharge.      Consults:   Consults     Date and Time Order Name Status Description    2/21/2018 1713 Inpatient Consult to Internal Medicine Completed           Discharge Instructions:  Patient is weight bearing as tolerated on the operative leg.  Patient has anterior hip dislocation precautions in effect for 6 weeks post-op.  No external rotation past 45 degrees and no extension past 20 degrees.  Patient is to progress ambulation as tolerated.  Use walker as needed for stability and gait.  May progress to cane as tolerated.  The dressing is waterproof, and the patient may shower.  Keep dressing in place at least 7 days. May change dressing before saturated or starts to fall  off.  Patient will follow-up in the office in 10-14 days. Home health physical therapy will follow patient once patient is discharged home.   Call the office at [phone] for any questions or concerns.      Discharge Medications   Robby Hernandes   Home Medication Instructions KENDRA:[id number]    Printed on:02/22/18 0641   Medication Information                      aspirin (JULIO ASPIRIN) 325 MG tablet  Take 1 tablet by mouth Daily for 42 days.             hydrochlorothiazide (HYDRODIURIL) 12.5 MG tablet  Take 12.5 mg by mouth Daily.             metFORMIN (GLUCOPHAGE) 500 MG tablet  Take 1,000 mg by mouth 2 (Two) Times a Day With Meals.             methocarbamol (ROBAXIN) 500 MG tablet  Take 1-2 tablets by mouth 4 (Four) Times a Day As Needed for Muscle Spasms.             oxyCODONE-acetaminophen (PERCOCET)  MG per tablet  Take 1-2 tablets by mouth Every 4 (Four) Hours As Needed for Moderate Pain  for up to 9 days.             pravastatin (PRAVACHOL) 40 MG tablet  Take 60 mg by mouth Every Night.             sennosides-docusate sodium (SENOKOT-S) 8.6-50 MG tablet  Take 2 tablets by mouth 2 (Two) Times a Day.             SITagliptin (JANUVIA) 100 MG tablet  Take 100 mg by mouth Daily.             venlafaxine (EFFEXOR) 75 MG tablet  Take 75 mg by mouth Daily.                 Discharge Diet:   Diet Instructions     Diet: Consistent Carbohydrate       Discharge Diet:  Consistent Carbohydrate                 Activity at Discharge:   Activity Instructions     Discharge Activity Restrictions       No driving until cleared.  May bear weight as tolerated on operative leg.                 Follow-up Appointments  No future appointments.  Additional Instructions for the Follow-ups that You Need to Schedule     Discharge Follow-up with Specified Provider: Dr. Briggs; 2 Weeks    As directed    To:  Dr. Briggs    Follow Up:  2 Weeks           Referral to Home Health    As directed    Face to Face Visit Date:  2/22/2018     Follow-up Provider for Plan of Care?:  I will be treating the patient on an ongoing basis.  Please send me the Plan of Care for signature.    Follow-up Provider:  DAMEON TREVIZO [[id number]]    Reason/Clinical Findings:  S/p AYDEN    Describe mobility limitations that make leaving home difficult:  taxing effort    Nursing/Therapeutic Services Requested:  Physical Therapy    PT orders:  Total joint pathway    Frequency:  1 Week 1                     Test Results Pending at Discharge  none     Dameon Trevizo MD  02/22/18,  6:41 AM

## 2018-02-22 NOTE — PLAN OF CARE
Problem: Patient Care Overview (Adult)  Goal: Plan of Care Review  Outcome: Ongoing (interventions implemented as appropriate)   02/22/18 0948   Coping/Psychosocial Response Interventions   Plan Of Care Reviewed With patient   Patient Care Overview   Progress progress toward functional goals as expected   Outcome Evaluation   Outcome Summary/Follow up Plan Pt increase with transfer safety and amb I with RWX

## 2018-02-22 NOTE — THERAPY TREATMENT NOTE
Acute Care - Physical Therapy Treatment Note  [facility]     Patient Name: Robby Hernandes  : 1970  MRN: [id number]  Today's Date: 2018  Onset of Illness/Injury or Date of Surgery Date: 18 (L AYDEN-anterior)  Date of Referral to PT: 18  Referring Physician: Brigitte    Admit Date: 2018    Visit Dx:    ICD-10-CM ICD-9-CM   1. Status post total replacement of left hip Z96.642 V43.64     Patient Active Problem List   Diagnosis   • Osteoarthritis of left hip   • Type 2 diabetes mellitus with hyperglycemia   • Hypertension   • COPD (chronic obstructive pulmonary disease)   • Sinus tachycardia               Adult Rehabilitation Note       18 0900          Rehab Assessment/Intervention    Discipline physical therapy assistant  -CW      Document Type therapy note (daily note)  -CW      Subjective Information agree to therapy;complains of;pain  -CW      Patient Effort, Rehab Treatment good  -CW      Precautions/Limitations anterior hip precautions- left;fall precautions  -CW      Recorded by [CW] Nasir Chavez PTA      Vital Signs    O2 Delivery Pre Treatment room air  -CW      Recorded by [CW] Nasir Chavez PTA      Pain Assessment    Pain Assessment 0-10  -CW      Pain Score 7  -CW      Post Pain Score 7  -CW      Pain Type Surgical pain  -CW      Pain Location Hip  -CW      Pain Orientation Left  -CW      Pain Intervention(s) Repositioned;Ambulation/increased activity  -CW      Response to Interventions tara  -CW      Recorded by [CW] Nasir Chavez PTA      Cognitive Assessment/Intervention    Current Cognitive/Communication Assessment functional  -CW      Orientation Status oriented x 4  -CW      Follows Commands/Answers Questions 100% of the time  -CW      Personal Safety WNL/WFL  -CW      Personal Safety Interventions fall prevention program maintained;gait belt;muscle strengthening facilitated;nonskid shoes/slippers when out of bed  -CW      Recorded by [CW] Nasir CRUZ  RAMIREZ Chavez      ROM (Range of Motion)    General ROM Detail WFL  -CW      Recorded by [CW] Nasir Chavez PTA      Bed Mobility, Assessment/Treatment    Bed Mob, Supine to Sit, [place] not tested  -CW      Bed Mobility, Comment in chair  -CW      Recorded by [CW] Nasir Chavez PTA      Transfer Assessment/Treatment    Transfers, Sit-Stand [place] supervision required  -CW      Transfers, Stand-Sit [place] supervision required  -CW      Transfers, Sit-Stand-Sit, Assist Device rolling walker  -CW      Recorded by [CW] Nasir Chavez PTA      Gait Assessment/Treatment    Gait, [place] Level supervision required  -CW      Gait, Assistive Device rolling walker  -CW      Gait, Distance (Feet) 100  -CW      Gait, Gait Deviations left:;antalgic;ben decreased;step length decreased;stride length decreased  -CW      Gait, Impairments pain  -CW      Recorded by [MICHAEL] Nasir Chavez PTA      Therapy Exercises    Exercise Protocols total hip  -CW      Total Hip Exercises left:;15 reps;completed protocol  -CW      Recorded by [CW] Nasir Chavez PTA      Positioning and Restraints    Pre-Treatment Position sitting in chair/recliner  -CW      Post Treatment Position chair  -CW      In Chair notified nsg;reclined;call light within reach;encouraged to call for assist  -CW      Recorded by [CW] Nasir Chavez PTA        User Key  (r) = Recorded By, (t) = Taken By, (c) = Cosigned By    Initials Name Effective Dates     Nasir Chavez PTA 12/13/16 -                 IP PT Goals       02/21/18 1553          Bed Mobility PT LTG    Bed Mobility PT LTG, Date Established 02/21/18  -MA      Bed Mobility PT LTG, Time to Achieve 1 wk  -MA      Bed Mobility PT LTG, Activity Type all bed mobility  -MA      Bed Mobility PT LTG, [place] Level supervision required  -MA      Transfer Training PT LTG    Transfer Training PT LTG, Date Established 02/21/18  -MA      Transfer Training  PT LTG, Time to Achieve 1 wk  -MA      Transfer Training PT LTG, Activity Type all transfers  -MA      Transfer Training PT LTG, [place] Level supervision required  -MA      Transfer Training PT LTG, Assist Device walker, rolling  -MA      Gait Training PT LTG    Gait Training Goal PT LTG, Date Established 02/21/18  -MA      Gait Training Goal PT LTG, Time to Achieve 1 wk  -MA      Gait Training Goal PT LTG, [place] Level supervision required  -MA      Gait Training Goal PT LTG, Assist Device walker, rolling  -MA      Gait Training Goal PT LTG, Distance to Achieve 150  -MA      Stair Training PT LTG    Stair Training Goal PT LTG, Date Established 02/21/18  -MA      Stair Training Goal PT LTG, Time to Achieve 1 wk  -MA      Stair Training Goal PT LTG, Number of Steps 4  -MA      Stair Training Goal PT LTG, [place] Level contact guard assist  -MA      Stair Training Goal PT LTG, Assist Device 1 handrail  -MA        User Key  (r) = Recorded By, (t) = Taken By, (c) = Cosigned By    Initials Name Provider Type    EMILY Boyer PT Physical Therapist          Physical Therapy Education     Title: PT OT SLP Therapies (Resolved)     Topic: Physical Therapy (Resolved)     Point: Mobility training (Resolved)    Learning Progress Summary    Learner Readiness Method Response Comment Documented by Status   Patient Acceptance E,JOE DELONG   02/22/18 0945 Done    Acceptance E NR  MA 02/21/18 1556 Active               Point: Home exercise program (Resolved)    Learning Progress Summary    Learner Readiness Method Response Comment Documented by Status   Patient Acceptance EEDWARD VU   02/22/18 0945 Done    Acceptance E NR  MA 02/21/18 1556 Active               Point: Body mechanics (Resolved)    Learning Progress Summary    Learner Readiness Method Response Comment Documented by Status   Patient Acceptance E,JOE DELONG   02/22/18 0945 Done    Acceptance E NR  MA 02/21/18 1556 Active               Point:  Precautions (Resolved)    Learning Progress Summary    Learner Readiness Method Response Comment Documented by Status   Patient Acceptance E,TB DU,VU   02/22/18 0963 Done    Acceptance E NR  MA 02/21/18 4286 Active                      User Key     Initials Effective Dates Name Provider Type Discipline    MA 12/13/16 -  Emilie Boyer, PT Physical Therapist PT     12/13/16 -  Nasir Chavez, PTA Physical Therapy Assistant PT                    PT Recommendation and Plan  Anticipated Equipment Needs At Discharge: bedside commode, front wheeled walker (PT ordered 2/21- requested dylon [facility])  Anticipated Discharge Disposition: home with assist, home with home health  Planned Therapy Interventions: balance training, bed mobility training, gait training, home exercise program, patient/family education, postural re-education, strengthening, transfer training, ROM (Range of Motion), stair training  PT Frequency: 2 times/day  Plan of Care Review  Plan Of Care Reviewed With: patient  Progress: progress toward functional goals as expected  Outcome Summary/Follow up Plan: Pt increase with transfer safety and amb I with RWX          Outcome Measures       02/22/18 0900 02/21/18 1500       How much help from another person do you currently need...    Turning from your back to your side while in flat bed without using bedrails? 3  -CW 3  -MA     Moving from lying on back to sitting on the side of a flat bed without bedrails? 3  -CW 3  -MA     Moving to and from a bed to a chair (including a wheelchair)? 3  -CW 3  -MA     Standing up from a chair using your arms (e.g., wheelchair, bedside chair)? 3  -CW 3  -MA     Climbing 3-5 steps with a railing? 2  -CW 2  -MA     To walk in hospital room? 3  -CW 3  -MA     AM-PAC 6 Clicks Score 17  -CW 17  -MA     Functional Assessment    Outcome Measure Options AM-PAC 6 Clicks Basic Mobility (PT)  -CW AM-PAC 6 Clicks Basic Mobility (PT)  -MA       User Key  (r) = Recorded By, (t) =  Taken By, (c) = Cosigned By    Initials Name Provider Type    EMILY Boyer, PT Physical Therapist    CW Nasir Chavez PTA Physical Therapy Assistant           Time Calculation:         PT Charges       02/22/18 0954          Time Calculation    Start Time 0835  -CW      Stop Time 0922  -CW      Time Calculation (min) 47 min  -CW      PT Received On 02/22/18  -CW        User Key  (r) = Recorded By, (t) = Taken By, (c) = Cosigned By    Initials Name Provider Type    CW Nasir Chavez PTA Physical Therapy Assistant          Therapy Charges for Today     Code Description Service Date Service Provider Modifiers Qty    19145702893 HC PT THER PROC GROUP 2/22/2018 Nasir Chavez PTA GP 1    79559576197 HC PT THER PROC EA 15 MIN 2/22/2018 Nasir Chavez PTA GP 1          PT G-Codes  Outcome Measure Options: AM-PAC 6 Clicks Basic Mobility (PT)    Nasir Chavez PTA  2/22/2018

## 2018-04-02 ENCOUNTER — OFFICE VISIT CONVERTED (OUTPATIENT)
Dept: FAMILY MEDICINE CLINIC | Facility: CLINIC | Age: 48
End: 2018-04-02
Attending: FAMILY MEDICINE

## 2018-06-08 ENCOUNTER — OFFICE VISIT CONVERTED (OUTPATIENT)
Dept: FAMILY MEDICINE CLINIC | Facility: CLINIC | Age: 48
End: 2018-06-08
Attending: NURSE PRACTITIONER

## 2018-07-02 ENCOUNTER — OFFICE VISIT CONVERTED (OUTPATIENT)
Dept: FAMILY MEDICINE CLINIC | Facility: CLINIC | Age: 48
End: 2018-07-02
Attending: FAMILY MEDICINE

## 2018-07-02 ENCOUNTER — CONVERSION ENCOUNTER (OUTPATIENT)
Dept: FAMILY MEDICINE CLINIC | Facility: CLINIC | Age: 48
End: 2018-07-02

## 2018-07-19 ENCOUNTER — OFFICE VISIT CONVERTED (OUTPATIENT)
Dept: FAMILY MEDICINE CLINIC | Facility: CLINIC | Age: 48
End: 2018-07-19
Attending: FAMILY MEDICINE

## 2018-10-18 ENCOUNTER — CONVERSION ENCOUNTER (OUTPATIENT)
Dept: FAMILY MEDICINE CLINIC | Facility: CLINIC | Age: 48
End: 2018-10-18

## 2018-10-18 ENCOUNTER — OFFICE VISIT CONVERTED (OUTPATIENT)
Dept: FAMILY MEDICINE CLINIC | Facility: CLINIC | Age: 48
End: 2018-10-18
Attending: FAMILY MEDICINE

## 2019-07-08 ENCOUNTER — HOSPITAL ENCOUNTER (OUTPATIENT)
Dept: FAMILY MEDICINE CLINIC | Facility: CLINIC | Age: 49
Discharge: HOME | End: 2019-07-08
Attending: FAMILY MEDICINE

## 2019-07-08 ENCOUNTER — OFFICE VISIT CONVERTED (OUTPATIENT)
Dept: FAMILY MEDICINE CLINIC | Facility: CLINIC | Age: 49
End: 2019-07-08
Attending: FAMILY MEDICINE

## 2019-07-08 LAB
ALBUMIN SERPL-MCNC: 4.8 G/DL (ref 3.5–5)
ALBUMIN/GLOB SERPL: 1.6 {RATIO} (ref 1.4–2.6)
ALP SERPL-CCNC: 59 U/L (ref 53–128)
ALT SERPL-CCNC: 43 U/L (ref 10–40)
ANION GAP SERPL CALC-SCNC: 29 MMOL/L (ref 8–19)
AST SERPL-CCNC: 28 U/L (ref 15–50)
BASOPHILS # BLD AUTO: 0.07 10*3/UL (ref 0–0.2)
BASOPHILS NFR BLD AUTO: 0.8 % (ref 0–3)
BILIRUB SERPL-MCNC: 0.46 MG/DL (ref 0.2–1.3)
BUN SERPL-MCNC: 19 MG/DL (ref 5–25)
BUN/CREAT SERPL: 12 {RATIO} (ref 6–20)
CALCIUM SERPL-MCNC: 10.2 MG/DL (ref 8.7–10.4)
CHLORIDE SERPL-SCNC: 90 MMOL/L (ref 99–111)
CONV ABS IMM GRAN: 0.05 10*3/UL (ref 0–0.2)
CONV CO2: 23 MMOL/L (ref 22–32)
CONV CREATININE URINE, RANDOM: 116.8 MG/DL (ref 10–300)
CONV IMMATURE GRAN: 0.6 % (ref 0–1.8)
CONV MICROALBUM.,U,RANDOM: <12 MG/L (ref 0–20)
CONV TOTAL PROTEIN: 7.8 G/DL (ref 6.3–8.2)
CREAT UR-MCNC: 1.57 MG/DL (ref 0.7–1.2)
DEPRECATED RDW RBC AUTO: 38.1 FL (ref 35.1–43.9)
EOSINOPHIL # BLD AUTO: 0.16 10*3/UL (ref 0–0.7)
EOSINOPHIL # BLD AUTO: 1.9 % (ref 0–7)
ERYTHROCYTE [DISTWIDTH] IN BLOOD BY AUTOMATED COUNT: 11.8 % (ref 11.6–14.4)
EST. AVERAGE GLUCOSE BLD GHB EST-MCNC: 209 MG/DL
GFR SERPLBLD BASED ON 1.73 SQ M-ARVRAT: 50 ML/MIN/{1.73_M2}
GLOBULIN UR ELPH-MCNC: 3 G/DL (ref 2–3.5)
GLUCOSE SERPL-MCNC: 243 MG/DL (ref 70–99)
HBA1C MFR BLD: 16.5 G/DL (ref 14–18)
HBA1C MFR BLD: 8.9 % (ref 3.5–5.7)
HCT VFR BLD AUTO: 48.8 % (ref 42–52)
LYMPHOCYTES # BLD AUTO: 2.16 10*3/UL (ref 1–5)
MCH RBC QN AUTO: 30.2 PG (ref 27–31)
MCHC RBC AUTO-ENTMCNC: 33.8 G/DL (ref 33–37)
MCV RBC AUTO: 89.2 FL (ref 80–96)
MICROALBUMIN/CREAT UR: 10.3 MG/G{CRE} (ref 0–25)
MONOCYTES # BLD AUTO: 0.76 10*3/UL (ref 0.2–1.2)
MONOCYTES NFR BLD AUTO: 8.9 % (ref 3–10)
NEUTROPHILS # BLD AUTO: 5.34 10*3/UL (ref 2–8)
NEUTROPHILS NFR BLD AUTO: 62.5 % (ref 30–85)
NRBC CBCN: 0 % (ref 0–0.7)
OSMOLALITY SERPL CALC.SUM OF ELEC: 294 MOSM/KG (ref 273–304)
PLATELET # BLD AUTO: 289 10*3/UL (ref 130–400)
PMV BLD AUTO: 10.6 FL (ref 9.4–12.4)
POTASSIUM SERPL-SCNC: 4.5 MMOL/L (ref 3.5–5.3)
PSA SERPL-MCNC: 0.52 NG/ML (ref 0–4)
RBC # BLD AUTO: 5.47 10*6/UL (ref 4.7–6.1)
SODIUM SERPL-SCNC: 137 MMOL/L (ref 135–147)
TSH SERPL-ACNC: 1.16 M[IU]/L (ref 0.27–4.2)
VARIANT LYMPHS NFR BLD MANUAL: 25.3 % (ref 20–45)
WBC # BLD AUTO: 8.54 10*3/UL (ref 4.8–10.8)

## 2019-07-10 LAB
CHOLEST SERPL-MCNC: 253 MG/DL (ref 107–200)
CHOLEST/HDLC SERPL: 8.4 {RATIO} (ref 3–6)
HDLC SERPL-MCNC: 30 MG/DL (ref 40–60)
LDLC SERPL CALC-MCNC: 95 MG/DL (ref 70–100)
TRIGL SERPL-MCNC: 691 MG/DL (ref 40–150)

## 2019-12-09 ENCOUNTER — HOSPITAL ENCOUNTER (OUTPATIENT)
Dept: FAMILY MEDICINE CLINIC | Facility: CLINIC | Age: 49
Discharge: HOME | End: 2019-12-09
Attending: FAMILY MEDICINE

## 2019-12-09 ENCOUNTER — OFFICE VISIT CONVERTED (OUTPATIENT)
Dept: FAMILY MEDICINE CLINIC | Facility: CLINIC | Age: 49
End: 2019-12-09
Attending: FAMILY MEDICINE

## 2019-12-11 ENCOUNTER — OFFICE VISIT CONVERTED (OUTPATIENT)
Dept: FAMILY MEDICINE CLINIC | Facility: CLINIC | Age: 49
End: 2019-12-11
Attending: FAMILY MEDICINE

## 2019-12-11 LAB — BACTERIA SPEC AEROBE CULT: NORMAL

## 2019-12-13 ENCOUNTER — OFFICE VISIT CONVERTED (OUTPATIENT)
Dept: FAMILY MEDICINE CLINIC | Facility: CLINIC | Age: 49
End: 2019-12-13
Attending: FAMILY MEDICINE

## 2019-12-16 ENCOUNTER — OFFICE VISIT CONVERTED (OUTPATIENT)
Dept: FAMILY MEDICINE CLINIC | Facility: CLINIC | Age: 49
End: 2019-12-16
Attending: FAMILY MEDICINE

## 2019-12-18 ENCOUNTER — OFFICE VISIT CONVERTED (OUTPATIENT)
Dept: FAMILY MEDICINE CLINIC | Facility: CLINIC | Age: 49
End: 2019-12-18
Attending: FAMILY MEDICINE

## 2019-12-20 ENCOUNTER — OFFICE VISIT CONVERTED (OUTPATIENT)
Dept: FAMILY MEDICINE CLINIC | Facility: CLINIC | Age: 49
End: 2019-12-20
Attending: FAMILY MEDICINE

## 2020-01-09 ENCOUNTER — OFFICE VISIT CONVERTED (OUTPATIENT)
Dept: FAMILY MEDICINE CLINIC | Facility: CLINIC | Age: 50
End: 2020-01-09
Attending: NURSE PRACTITIONER

## 2020-03-09 ENCOUNTER — HOSPITAL ENCOUNTER (OUTPATIENT)
Dept: FAMILY MEDICINE CLINIC | Facility: CLINIC | Age: 50
Discharge: HOME | End: 2020-03-09
Attending: FAMILY MEDICINE

## 2020-03-09 ENCOUNTER — OFFICE VISIT CONVERTED (OUTPATIENT)
Dept: FAMILY MEDICINE CLINIC | Facility: CLINIC | Age: 50
End: 2020-03-09
Attending: FAMILY MEDICINE

## 2020-03-09 LAB
ALBUMIN SERPL-MCNC: 4.4 G/DL (ref 3.5–5)
ALBUMIN/GLOB SERPL: 1.5 {RATIO} (ref 1.4–2.6)
ALP SERPL-CCNC: 59 U/L (ref 56–119)
ALT SERPL-CCNC: 46 U/L (ref 10–40)
ANION GAP SERPL CALC-SCNC: 20 MMOL/L (ref 8–19)
AST SERPL-CCNC: 28 U/L (ref 15–50)
BASOPHILS # BLD AUTO: 0.08 10*3/UL (ref 0–0.2)
BASOPHILS NFR BLD AUTO: 1 % (ref 0–3)
BILIRUB SERPL-MCNC: 0.29 MG/DL (ref 0.2–1.3)
BUN SERPL-MCNC: 17 MG/DL (ref 5–25)
BUN/CREAT SERPL: 23 {RATIO} (ref 6–20)
CALCIUM SERPL-MCNC: 10.4 MG/DL (ref 8.7–10.4)
CHLORIDE SERPL-SCNC: 96 MMOL/L (ref 99–111)
CHOLEST SERPL-MCNC: 304 MG/DL (ref 107–200)
CHOLEST/HDLC SERPL: 9.5 {RATIO} (ref 3–6)
CONV ABS IMM GRAN: 0.06 10*3/UL (ref 0–0.2)
CONV CO2: 26 MMOL/L (ref 22–32)
CONV CREATININE URINE, RANDOM: 50.8 MG/DL (ref 10–300)
CONV IMMATURE GRAN: 0.7 % (ref 0–1.8)
CONV MICROALBUM.,U,RANDOM: <12 MG/L (ref 0–20)
CONV TOTAL PROTEIN: 7.3 G/DL (ref 6.3–8.2)
CREAT UR-MCNC: 0.75 MG/DL (ref 0.7–1.2)
DEPRECATED RDW RBC AUTO: 38.5 FL (ref 35.1–43.9)
EOSINOPHIL # BLD AUTO: 0.2 10*3/UL (ref 0–0.7)
EOSINOPHIL # BLD AUTO: 2.5 % (ref 0–7)
ERYTHROCYTE [DISTWIDTH] IN BLOOD BY AUTOMATED COUNT: 12.1 % (ref 11.6–14.4)
EST. AVERAGE GLUCOSE BLD GHB EST-MCNC: 180 MG/DL
GFR SERPLBLD BASED ON 1.73 SQ M-ARVRAT: >60 ML/MIN/{1.73_M2}
GLOBULIN UR ELPH-MCNC: 2.9 G/DL (ref 2–3.5)
GLUCOSE SERPL-MCNC: 261 MG/DL (ref 70–99)
HBA1C MFR BLD: 7.9 % (ref 3.5–5.7)
HCT VFR BLD AUTO: 45.8 % (ref 42–52)
HDLC SERPL-MCNC: 32 MG/DL (ref 40–60)
HGB BLD-MCNC: 15.7 G/DL (ref 14–18)
LDLC SERPL CALC-MCNC: 161 MG/DL (ref 70–100)
LYMPHOCYTES # BLD AUTO: 1.98 10*3/UL (ref 1–5)
LYMPHOCYTES NFR BLD AUTO: 24.6 % (ref 20–45)
MCH RBC QN AUTO: 30 PG (ref 27–31)
MCHC RBC AUTO-ENTMCNC: 34.3 G/DL (ref 33–37)
MCV RBC AUTO: 87.6 FL (ref 80–96)
MICROALBUMIN/CREAT UR: 23.6 MG/G{CRE} (ref 0–25)
MONOCYTES # BLD AUTO: 0.74 10*3/UL (ref 0.2–1.2)
MONOCYTES NFR BLD AUTO: 9.2 % (ref 3–10)
NEUTROPHILS # BLD AUTO: 4.98 10*3/UL (ref 2–8)
NEUTROPHILS NFR BLD AUTO: 62 % (ref 30–85)
NRBC CBCN: 0 % (ref 0–0.7)
OSMOLALITY SERPL CALC.SUM OF ELEC: 297 MOSM/KG (ref 273–304)
PLATELET # BLD AUTO: 261 10*3/UL (ref 130–400)
PMV BLD AUTO: 10.6 FL (ref 9.4–12.4)
POTASSIUM SERPL-SCNC: 4 MMOL/L (ref 3.5–5.3)
RBC # BLD AUTO: 5.23 10*6/UL (ref 4.7–6.1)
SODIUM SERPL-SCNC: 138 MMOL/L (ref 135–147)
T4 FREE SERPL-MCNC: 1.2 NG/DL (ref 0.9–1.8)
TRIGL SERPL-MCNC: 1217 MG/DL (ref 40–150)
TSH SERPL-ACNC: 2.18 M[IU]/L (ref 0.27–4.2)
WBC # BLD AUTO: 8.04 10*3/UL (ref 4.8–10.8)

## 2020-11-18 ENCOUNTER — HOSPITAL ENCOUNTER (OUTPATIENT)
Dept: FAMILY MEDICINE CLINIC | Facility: CLINIC | Age: 50
Discharge: HOME | End: 2020-11-18
Attending: FAMILY MEDICINE

## 2020-11-21 LAB — SARS-COV-2 RNA SPEC QL NAA+PROBE: NOT DETECTED

## 2021-03-09 ENCOUNTER — HOSPITAL ENCOUNTER (OUTPATIENT)
Dept: FAMILY MEDICINE CLINIC | Facility: CLINIC | Age: 51
Discharge: HOME | End: 2021-03-09
Attending: FAMILY MEDICINE

## 2021-03-09 ENCOUNTER — OFFICE VISIT CONVERTED (OUTPATIENT)
Dept: FAMILY MEDICINE CLINIC | Facility: CLINIC | Age: 51
End: 2021-03-09
Attending: FAMILY MEDICINE

## 2021-03-09 LAB
25(OH)D3 SERPL-MCNC: 23.1 NG/ML (ref 30–100)
ALBUMIN SERPL-MCNC: 4.5 G/DL (ref 3.5–5)
ALBUMIN/GLOB SERPL: 1.5 {RATIO} (ref 1.4–2.6)
ALP SERPL-CCNC: 62 U/L (ref 56–119)
ALT SERPL-CCNC: 58 U/L (ref 10–40)
ANION GAP SERPL CALC-SCNC: 22 MMOL/L (ref 8–19)
AST SERPL-CCNC: 33 U/L (ref 15–50)
BASOPHILS # BLD AUTO: 0.08 10*3/UL (ref 0–0.2)
BASOPHILS NFR BLD AUTO: 1.2 % (ref 0–3)
BILIRUB SERPL-MCNC: 0.46 MG/DL (ref 0.2–1.3)
BUN SERPL-MCNC: 16 MG/DL (ref 5–25)
BUN/CREAT SERPL: 22 {RATIO} (ref 6–20)
CALCIUM SERPL-MCNC: 9.8 MG/DL (ref 8.7–10.4)
CHLORIDE SERPL-SCNC: 97 MMOL/L (ref 99–111)
CHOLEST SERPL-MCNC: 268 MG/DL (ref 107–200)
CHOLEST/HDLC SERPL: 7.4 {RATIO} (ref 3–6)
CONV ABS IMM GRAN: 0.03 10*3/UL (ref 0–0.2)
CONV CO2: 21 MMOL/L (ref 22–32)
CONV CREATININE URINE, RANDOM: 47.5 MG/DL (ref 10–300)
CONV IMMATURE GRAN: 0.4 % (ref 0–1.8)
CONV MICROALBUM.,U,RANDOM: 16.1 MG/L (ref 0–20)
CONV TOTAL PROTEIN: 7.5 G/DL (ref 6.3–8.2)
CREAT UR-MCNC: 0.72 MG/DL (ref 0.7–1.2)
DEPRECATED RDW RBC AUTO: 38.6 FL (ref 35.1–43.9)
EOSINOPHIL # BLD AUTO: 0.2 10*3/UL (ref 0–0.7)
EOSINOPHIL # BLD AUTO: 3 % (ref 0–7)
ERYTHROCYTE [DISTWIDTH] IN BLOOD BY AUTOMATED COUNT: 12 % (ref 11.6–14.4)
EST. AVERAGE GLUCOSE BLD GHB EST-MCNC: 223 MG/DL
GFR SERPLBLD BASED ON 1.73 SQ M-ARVRAT: >60 ML/MIN/{1.73_M2}
GLOBULIN UR ELPH-MCNC: 3 G/DL (ref 2–3.5)
GLUCOSE SERPL-MCNC: 280 MG/DL (ref 70–99)
HBA1C MFR BLD: 9.4 % (ref 3.5–5.7)
HCT VFR BLD AUTO: 45.9 % (ref 42–52)
HDLC SERPL-MCNC: 36 MG/DL (ref 40–60)
HGB BLD-MCNC: 15.6 G/DL (ref 14–18)
LDLC SERPL CALC-MCNC: 114 MG/DL (ref 70–100)
LYMPHOCYTES # BLD AUTO: 1.84 10*3/UL (ref 1–5)
LYMPHOCYTES NFR BLD AUTO: 27.4 % (ref 20–45)
MCH RBC QN AUTO: 29.9 PG (ref 27–31)
MCHC RBC AUTO-ENTMCNC: 34 G/DL (ref 33–37)
MCV RBC AUTO: 87.9 FL (ref 80–96)
MICROALBUMIN/CREAT UR: 33.9 MG/G{CRE} (ref 0–25)
MONOCYTES # BLD AUTO: 0.61 10*3/UL (ref 0.2–1.2)
MONOCYTES NFR BLD AUTO: 9.1 % (ref 3–10)
NEUTROPHILS # BLD AUTO: 3.96 10*3/UL (ref 2–8)
NEUTROPHILS NFR BLD AUTO: 58.9 % (ref 30–85)
NRBC CBCN: 0 % (ref 0–0.7)
OSMOLALITY SERPL CALC.SUM OF ELEC: 293 MOSM/KG (ref 273–304)
PLATELET # BLD AUTO: 207 10*3/UL (ref 130–400)
PMV BLD AUTO: 11.8 FL (ref 9.4–12.4)
POTASSIUM SERPL-SCNC: 4.2 MMOL/L (ref 3.5–5.3)
PSA SERPL-MCNC: 0.38 NG/ML (ref 0–4)
RBC # BLD AUTO: 5.22 10*6/UL (ref 4.7–6.1)
SODIUM SERPL-SCNC: 136 MMOL/L (ref 135–147)
T4 FREE SERPL-MCNC: 1.1 NG/DL (ref 0.9–1.8)
TRIGL SERPL-MCNC: 710 MG/DL (ref 40–150)
TSH SERPL-ACNC: 1.28 M[IU]/L (ref 0.27–4.2)
WBC # BLD AUTO: 6.72 10*3/UL (ref 4.8–10.8)

## 2021-03-11 LAB — SARS-COV-2 RNA SPEC QL NAA+PROBE: NOT DETECTED

## 2021-04-21 ENCOUNTER — HOSPITAL ENCOUNTER (OUTPATIENT)
Dept: URGENT CARE | Facility: CLINIC | Age: 51
Discharge: HOME | End: 2021-04-21
Attending: FAMILY MEDICINE

## 2021-05-07 NOTE — PROGRESS NOTES
Progress Note      Patient Name: Robby Hernandes   Patient ID: [id number]   Sex: Male   YOB: 1970        Visit Date: December 11, 2019    Provider: Beto Dawson DO   Location: [facility] [place]   Location Address: [address] [place] [address] [place]  [address]   Location Phone: [phone]          Chief Complaint     F/u on wound on back.       History Of Present Illness  Robby Hernandes is a 49 year old /White male who presents for evaluation and treatment of:      1.) F/u wound : The patient presents for follow up regarding a recent incision and drainage of a sebaceous cyst. The procedure was conducted on 12/09/19. He was placed on prophylactic abx and wound culture was sent. His wound culture was negative. He presents today reporting that he feels as if his sxs have improved significantly. He denies any pain. He denies noticing any drainage. He continues to take the antibiotic as prescribed.       Past Medical History  Disease Name Date Onset Notes   Allergic rhinitis --  --    Anxiety --  --    Arthritis --  --    Asthma --  --    Benign enlargement of prostate --  --    Chronic back pain --  --    COPD (chronic obstructive pulmonary disease) --  --    Depression --  --    Diabetes --  --    Diabetes mellitus, type 2 07/08/2019 --    Essential hypertension 07/08/2019 --    GERD (gastroesophageal reflux disease) --  --    Hypercholesterolemia --  --    Hyperlipidemia --  --    Hyperlipidemia 07/08/2019 --    Insomnia --  --    Low Testosterone --  --    Peripheral neuropathy --  --    Sleep apnea --  --    Vitamin D Deficiency --  --          Past Surgical History  Procedure Name Date Notes   Total hip replacement --  --          Medication List  Name Date Started Instructions   aspirin 81 mg oral tablet,chewable  chew 1 tablet (81 mg) by oral route once daily   doxycycline monohydrate 100 mg oral capsule 12/09/2019 Take 1 tablet by mouth twice a day until finished    fenofibrate nanocrystallized 145 mg oral tablet 12/03/2019 take 1 tablet by mouth at bedtime   Fish Oil 1,000 mg (120 mg-180 mg) oral capsule  --    FreeStyle Test miscellaneous strip  use as directed   hydrochlorothiazide 12.5 mg oral tablet 07/08/2019 TAKE 1 TABLET DAILY.   Januvia 100 mg oral tablet 12/03/2019 take 1 tablet by mouth once daily   losartan 100 mg oral tablet 07/08/2019 take 1 tablet (100 mg) by oral route once daily for 30 days   metformin 1,000 mg oral tablet 12/03/2019 take 1 tablet (1,000 mg) by oral route 2 times per day with morning and evening meals for 30 days   montelukast 10 mg oral tablet 12/03/2019 take 1 tablet by mouth once daily   pravastatin 20 mg oral tablet 07/08/2019 take 1 tablet (20 mg) by oral route once daily for 30 days   pravastatin 40 mg oral tablet 07/08/2019 take 1 tablet by oral route daily for 30 days   venlafaxine 75 mg oral capsule,extended release 24hr 12/03/2019 TAKE ONE CAPSULE BY MOUTH ONCE A DAY   Vitamin D3 2,000 unit oral capsule 07/08/2019 take 1 tablet by mouth daily         Allergy List  Allergen Name Date Reaction Notes   Red Meats --  --  --          Social History  Finding Status Start/Stop Quantity Notes   Alcohol Current some day --/-- <7 weekly --    Tobacco Former --/40 --  --          Immunizations  NameDate Admin Mfg Trade Name Lot Number Route Inj VIS Given VIS Publication   Sncuxqcve83/03/2018 UNK Unknown TradeName [id number] NE NE 10/18/2018 08/07/2015   Comments:    Nwkpbclpb64/05/2017 UNK Unknown TradeName [id number] NE NE 04/02/2018 08/07/2015   Comments:    Tdap01/07/2019 NE Not Entered  NE NE 01/08/2019    Comments: [facility]         Review of Systems  · Constitutional  o Denies  o : fatigue, night sweats  · Eyes  o Denies  o : double vision, blurred vision  · HENT  o Denies  o : vertigo, recent head injury  · Cardiovascular  o Denies  o : chest pain, irregular heart beats  · Respiratory  o Denies  o : shortness of breath, productive  cough  · Gastrointestinal  o Denies  o : nausea, vomiting  · Genitourinary  o Denies  o : dysuria, urinary retention  · Integument  o Admits  o : wound of upper back region s/p incision and drainage on 12/9/19  · Neurologic  o Denies  o : altered mental status, seizures  · Musculoskeletal  o Denies  o : joint swelling, limitation of motion  · Endocrine  o Denies  o : cold intolerance, heat intolerance  · Heme-Lymph  o Denies  o : petechiae, lymph node enlargement or tenderness  · Allergic-Immunologic  o Denies  o : frequent illnesses      Vitals  Date Time BP Position Site L\R Cuff Size HR RR TEMP (F) WT  HT  BMI kg/m2 BSA m2 O2 Sat HC       12/11/2019 01:47 /68 Sitting    101 - R  97 241lbs 16oz    95 %          Physical Examination  · Constitutional  o Appearance  o : well developed, well-nourished, in no acute distress  · Head and Face  o HEENT  o : Unremarkable  · Eyes  o Conjunctivae  o : conjunctivae normal  · Neck  o Inspection/Palpation  o : supple  · Respiratory  o Respiratory Effort  o : breathing unlabored  · Cardiovascular  o Peripheral Vascular System  o :   § Extremities  § : no edema  · Lymphatic  o Neck  o : no lymphadenopathy present  · Musculoskeletal  o General  o :   § General Musculoskeletal  § : No joint swelling or deformity.  · Skin and Subcutaneous Tissue  o General Inspection  o : linear wound noted s/p incision and drainage; no drainage appreciated; proximal erythema of skin appreciated; no malodor appreciated; packing in place   · Neurologic  o Gait and Station  o :   § Gait Screening  § : normal gait  · Psychiatric  o Mood and Affect  o : mood normal, affect appropriate          Assessment  · Wound healing well on examination     782.9/Z48.00    A.) Packing removed and replaced; patient to return in 2 days; advised to continue taking abx as prescribed; call if any concerning signs of symptoms.       Plan  · Orders  o ACO-39: Current medications updated and reviewed () - -  12/11/2019  · Medications  o Medications have been Reconciled  o Transition of Care or Provider Policy  · Instructions  o Take all medications as prescribed/directed.  o Patient was educated/instructed on their diagnosis, treatment and medications prior to discharge from the clinic today.            Electronically Signed by: Beto Dawson DO - on December 11, 2019 02:15:05 PM

## 2021-05-07 NOTE — PROGRESS NOTES
Progress Note      Patient Name: Robby Hernandes   Patient ID: [id number]   Sex: Male   YOB: 1970        Visit Date: July 8, 2019    Provider: Wei Lauren MD   Location: [facility]   Location Address: [address] [place] [address] [place]  [address]   Location Phone: [phone]          Chief Complaint     refills       History Of Present Illness  Robby Hernandes is a 49 year old /White male who presents for evaluation and treatment of:      need refills on meds  DM II- unknown control  HTN-   hyperlipidemia- unknown control- need labs  pt seeing pain management for chronic back and leg pain    recheck BP: 150/100       Past Medical History  Disease Name Date Onset Notes   Allergic rhinitis --  --    Anxiety --  --    Arthritis --  --    Asthma --  --    Benign enlargement of prostate --  --    Chronic back pain --  --    COPD (chronic obstructive pulmonary disease) --  --    Depression --  --    Diabetes --  --    Diabetes mellitus, type 2 07/08/2019 --    Essential hypertension 07/08/2019 --    GERD (gastroesophageal reflux disease) --  --    Hypercholesterolemia --  --    Hyperlipidemia --  --    Hyperlipidemia 07/08/2019 --    Insomnia --  --    Low Testosterone --  --    Peripheral neuropathy --  --    Sleep apnea --  --    Vitamin D Deficiency --  --          Past Surgical History  Procedure Name Date Notes   Total hip replacement --  --          Medication List  Name Date Started Instructions   aspirin 81 mg oral tablet,chewable  chew 1 tablet (81 mg) by oral route once daily   fenofibrate nanocrystallized 145 mg oral tablet 07/08/2019 take 1 tablet by mouth at bedtime   Fish Oil 1,000 mg (120 mg-180 mg) oral capsule  --    FreeStyle Test miscellaneous strip  use as directed   hydrochlorothiazide 12.5 mg oral tablet 07/08/2019 TAKE 1 TABLET DAILY.   Januvia 100 mg oral tablet 07/08/2019 take 1 tablet by mouth once daily   losartan 50 mg oral tablet  take 1  "tablet (50 mg) by oral route once daily   metformin 1,000 mg oral tablet 07/08/2019 take 1 tablet (1,000 mg) by oral route 2 times per day with morning and evening meals for 30 days   montelukast 10 mg oral tablet 07/08/2019 take 1 tablet by mouth once daily   pravastatin 20 mg oral tablet 07/08/2019 take 1 tablet (20 mg) by oral route once daily for 30 days   pravastatin 40 mg oral tablet 07/08/2019 take 1 tablet by oral route daily for 30 days   venlafaxine 75 mg oral capsule,extended release 24hr 07/08/2019 TAKE ONE CAPSULE BY MOUTH ONCE A DAY   Vitamin D3 2,000 unit oral capsule 07/08/2019 take 1 tablet by mouth daily         Allergy List  Allergen Name Date Reaction Notes   PENICILLINS --  --  --    Red Meats --  --  --          Social History  Finding Status Start/Stop Quantity Notes   Alcohol Current some day --/-- <7 weekly --    Tobacco Former --/40 --  --          Immunizations  NameDate Admin Mfg Trade Name Lot Number Route Inj VIS Given VIS Publication   [id number]/03/2018 UNK Unknown TradeName [id number] NE NE 10/18/2018 08/07/2015   Comments:    Ybvbuducx40/05/2017 UNK Unknown TradeName [id number] NE NE 04/02/2018 08/07/2015   Comments:    Tdap01/07/2019 NE Not Entered  NE NE 01/08/2019    Comments: [facility]         Review of Systems  · Constitutional  o Denies  o : fatigue, fever  · Cardiovascular  o Denies  o : chest pain, palpitations  · Respiratory  o Denies  o : shortness of breath, cough  · Gastrointestinal  o Denies  o : nausea, vomiting, diarrhea      Vitals  Date Time BP Position Site L\R Cuff Size HR RR TEMP (F) WT  HT  BMI kg/m2 BSA m2 O2 Sat        07/08/2019 12:52 /90 Sitting    101 - R  97.2 260lbs 0oz 5'  10.5\" 36.78 2.42 98 %          Physical Examination  · Constitutional  o Appearance  o : well developed, well-nourished, in no acute distress  · Head and Face  o HEENT  o : Unremarkable  · Respiratory  o Respiratory Effort  o : breathing unlabored  o Auscultation of Lungs  o : clear to " ascultation  · Cardiovascular  o Heart  o :   § Auscultation of Heart  § : regular rate and rhythm  o Peripheral Vascular System  o :   § Extremities  § : no edema  · Gastrointestinal  o Abdomen  o : soft, non-tender, non-distended, + bowel sounds, no hepatosplenomegaly, no masses palpated  · Lymphatic  o Neck  o : no lymphadenopathy present  · Musculoskeletal  o General  o :   § General Musculoskeletal  § : No joint swelling or deformity., Muscle tone, strength, and development grossly normal.  · Neurologic  o Gait and Station  o :   § Gait Screening  § : normal gait  · Psychiatric  o Mood and Affect  o : mood normal, affect appropriate  · Left DM Foot Exam  o Sensation  o : normal sensory exam perceptible to 10-gram nylon monofilament exam (5/5), vibration and light touch.  o Visual Inspection  o : visual inspection is normal with no signs of breakdown, ulcerations or deformities unless otherwise noted.   o Vascular  o : palpable dorsalis pedis and posterir tibialis pulses present, normal capillary refill  · Right DM Foot Exam  o Sensation  o : normal sensory exam perceptible to 10-gram nylon monofilament exam (5/5), vibration and light touch.  o Visual Inspection  o : visual inspection is normal with no signs of breakdown, ulcerations or deformities unless otherwise noted.   o Vascular  o : palpable dorsalis pedis and posterir tibialis pulses present, normal capillary refill          Assessment  · Diabetes mellitus, type 2     250.00/E11.9  · Essential hypertension     401.9/I10  · Hyperlipidemia     272.4/E78.5  · Screen for colon cancer     V76.51/Z12.11  · Screening PSA (prostate specific antigen)     V76.44/Z12.5      Plan  · Orders  o OPHTHALMOLOGY CONSULTATION (OPHTH) - 250.00/E11.9 - 07/08/2019  o Diabetic Foot (Motor and Sensory) Exam Completed [facility] (, , 2028F) - 250.00/E11.9 - 07/08/2019  o CBC with Auto Diff [facility] ([id number]) - 250.00/E11.9 - 07/08/2019  o CMP [facility] (23331) - 250.00/E11.9 -  07/08/2019  o Hgb A1c [facility] (76943) - 250.00/E11.9 - 07/08/2019  o Lipid Panel [facility] (32027) - 250.00/E11.9 - 07/08/2019  o TSH [facility] (04736) - 401.9/I10 - 07/08/2019  o Urine microalbumin (73972) - 250.00/E11.9 - 07/08/2019  o ACO-39: Current medications updated and reviewed () - - 07/08/2019  o PSA Ultrasensitive, ANNUAL SCREENING [facility] ([id number]) - V76.44/Z12.5 - 07/08/2019  o Gastroenterology Consultation (GASTR) - V76.51/Z12.11 - 07/08/2019   needs screening colonoscopy- no change in stool  · Medications  o losartan 100 mg oral tablet   SIG: take 1 tablet (100 mg) by oral route once daily for 30 days   DISP: (30) tablets with 5 refills  Prescribed on 07/08/2019     o fenofibrate nanocrystallized 145 mg oral tablet   SIG: take 1 tablet by mouth at bedtime   DISP: (30) Tablet with 5 refills  Adjusted on 07/08/2019     o hydrochlorothiazide 12.5 mg oral tablet   SIG: TAKE 1 TABLET DAILY.   DISP: (30) Tablet with 5 refills  Adjusted on 07/08/2019     o Januvia 100 mg oral tablet   SIG: take 1 tablet by mouth once daily   DISP: (30) Tablet with 5 refills  Adjusted on 07/08/2019     o metformin 1,000 mg oral tablet   SIG: take 1 tablet (1,000 mg) by oral route 2 times per day with morning and evening meals for 30 days   DISP: (60) tablets with 5 refills  Adjusted on 07/08/2019     o montelukast 10 mg oral tablet   SIG: take 1 tablet by mouth once daily   DISP: (30) Tablet with 5 refills  Adjusted on 07/08/2019     o pravastatin 20 mg oral tablet   SIG: take 1 tablet (20 mg) by oral route once daily for 30 days   DISP: (30) tablets with 5 refills  Adjusted on 07/08/2019     o pravastatin 40 mg oral tablet   SIG: take 1 tablet by oral route daily for 30 days   DISP: (30) Tablet with 5 refills  Adjusted on 07/08/2019     o venlafaxine 75 mg oral capsule,extended release 24hr   SIG: TAKE ONE CAPSULE BY MOUTH ONCE A DAY   DISP: (30) Capsule with 5 refills  Adjusted on 07/08/2019     o Vitamin D3 2,000 unit oral capsule   SIG:  take 1 tablet by mouth daily   DISP: (30) Capsule with 5 refills  Adjusted on 07/08/2019     · Instructions  o Advised that cheeses and other sources of dairy fats, animal fats, fast food, and the extras (candy, pasteries, pies, doughnuts and cookies) all contain LDL raising nutrients. Advised to increase fruits, vegetables, whole grains, and to monitor portion sizes.   o Patient was educated/instructed on their diagnosis, treatment and medications prior to discharge from the clinic today.            Electronically Signed by: Wei Lauren MD -Author on July 8, 2019 01:50:02 PM

## 2021-05-07 NOTE — PROGRESS NOTES
Progress Note      Patient Name: Robby Hernandes   Patient ID: [id number]   Sex: Male   YOB: 1970        Visit Date: July 2, 2018    Provider: Wei Lauren MD   Location: [facility]   Location Address: [address] [place] [address]  [id number]   Location Phone: [phone]          Chief Complaint     3 month follow up       History Of Present Illness  Robby Hernandes is a 48 year old /White male who presents for evaluation and treatment of:      following up for labs  DM II- unknown control  hyperlipidemia- unknown control  pt will be seen allergist for alpha gal allergy- pt told to avoid red meat and gel caps and capsules       Past Medical History  Disease Name Date Onset Notes   Allergic rhinitis --  --    Anxiety --  --    Arthritis --  --    Asthma --  --    Benign enlargement of prostate --  --    Chronic back pain --  --    COPD (chronic obstructive pulmonary disease) --  --    Depression --  --    Diabetes --  --    GERD (gastroesophageal reflux disease) --  --    Hypercholesterolemia --  --    Hyperlipidemia --  --    Insomnia --  --    Low Testosterone --  --    Peripheral neuropathy --  --    Sleep apnea --  --    Vitamin D Deficiency --  --          Past Surgical History  Procedure Name Date Notes   Total hip replacement --  --          Medication List  Name Date Started Instructions   aspirin 81 mg oral tablet,chewable  chew 1 tablet (81 mg) by oral route once daily   doxycycline hyclate 100 mg oral tablet 06/08/2018 take 1 tablet by oral route 2 times a day for 10 days   fenofibrate nanocrystallized 145 mg oral tablet 04/30/2018 take 1 tablet by mouth at bedtime   Fish Oil 1,000 mg (120 mg-180 mg) oral capsule  --    FreeStyle Test miscellaneous strip  use as directed   hydrochlorothiazide 12.5 mg oral tablet 04/30/2018 TAKE 1 TABLET DAILY.   Januvia 100 mg oral tablet 04/02/2018 take 1 tablet by mouth once daily   losartan 50 mg oral tablet   "take 1 tablet (50 mg) by oral route once daily   metformin 500 mg oral tablet 04/30/2018 TAKE ONE TABLET BY MOUTH TWICE A DAY   montelukast 10 mg oral tablet 04/30/2018 take 1 tablet by mouth once daily   pravastatin 40 mg oral tablet 12/04/2017 take 1.5 tablets by oral route daily for 30 days   venlafaxine 75 mg oral capsule,extended release 24hr 04/02/2018 TAKE ONE CAPSULE BY MOUTH ONCE A DAY   Vitamin D3 2,000 unit oral capsule 04/02/2018 take 1 tablet by mouth daily         Allergy List  Allergen Name Date Reaction Notes   PENICILLINS --  --  --          Social History  Finding Status Start/Stop Quantity Notes   Alcohol Current some day --/-- <7 weekly --    Tobacco Former --/40 --  --          Immunizations  NameDate Admin Mfg Trade Name Lot Number Route Inj VIS Given VIS Publication   Gnuyvnzvo50/05/2017 UNK Unknown TradeName [id number] NE NE 04/02/2018 08/07/2015   Comments:          Review of Systems  · Constitutional  o Denies  o : fatigue, fever  · Cardiovascular  o Denies  o : chest pain, palpitations  · Respiratory  o Denies  o : shortness of breath, cough  · Gastrointestinal  o Denies  o : nausea, vomiting, diarrhea      Vitals  Date Time BP Position Site L\R Cuff Size HR RR TEMP(F) WT  HT  BMI kg/m2 BSA m2 O2 Sat        07/02/2018 10:15 /82 Sitting    105 - R  97.2 254lbs 1oz 5'  9\" 37.52 2.37 97 %           Physical Examination  · Constitutional  o Appearance  o : well developed, well-nourished, in no acute distress  · Respiratory  o Respiratory Effort  o : breathing unlabored  o Auscultation of Lungs  o : clear to ascultation  · Cardiovascular  o Heart  o :   § Auscultation of Heart  § : regular rate and rhythm  o Peripheral Vascular System  o :   § Extremities  § : no edema  · Gastrointestinal  o Abdomen  o : soft, non-tender, non-distended, + bowel sounds, no hepatosplenomegaly, no masses palpated  · Musculoskeletal  o General  o :   § General Musculoskeletal  § : No joint swelling or " deformity., Muscle tone, strength, and development grossly normal.  · Neurologic  o Gait and Station  o :   § Gait Screening  § : normal gait  · Psychiatric  o Mood and Affect  o : mood normal, affect appropriate          Assessment  · Diabetes mellitus, type 2     250.00/E11.9  · Hyperlipidemia     272.4/E78.5      Plan  · Orders  o CBC with Auto Diff [facility] (04588) - 250.00/E11.9, 272.4/E78.5 - 07/02/2018  o CMP [facility] (83208) - 250.00/E11.9, 272.4/E78.5 - 07/02/2018  o Hgb A1c [facility] (27445) - 250.00/E11.9, 272.4/E78.5 - 07/02/2018  o Lipid Panel [facility] (31177) - 250.00/E11.9, 272.4/E78.5 - 07/02/2018  o Urine microalbumin (75321) - 250.00/E11.9, 272.4/E78.5 - 07/02/2018  o ACO-14: Influenza immunization administered or previously received () - - 07/02/2018  o ACO-18: Depression screening not completed due to current diagnosis of depression or bipolar disorder () - - 07/02/2018  o ACO-39: Current medications updated and reviewed () - - 07/02/2018  · Medications  o doxycycline hyclate 100 mg oral tablet   SIG: take 1 tablet by oral route 2 times a day for 10 days   DISP: (20) tablets with 0 refills  Discontinued on 07/02/2018     · Instructions  o Advised that cheeses and other sources of dairy fats, animal fats, fast food, and the extras (candy, pasteries, pies, doughnuts and cookies) all contain LDL raising nutrients. Advised to increase fruits, vegetables, whole grains, and to monitor portion sizes.   o Patient was educated/instructed on their diagnosis, treatment and medications prior to discharge from the clinic today.            Electronically Signed by: Wei Lauren MD -Author on July 2, 2018 10:46:00 AM

## 2021-05-07 NOTE — PROGRESS NOTES
Progress Note      Patient Name: Robby Hernandes   Patient ID: [id number]   Sex: Male   YOB: 1970        Visit Date: October 18, 2018    Provider: Wei Lauren MD   Location: [facility]   Location Address: [address] [place] [address]  [id number]   Location Phone: [phone]          Chief Complaint     3 month follow up  fasting labs       History Of Present Illness  Robby Hernandes is a 48 year old /White male who presents for evaluation and treatment of:      discussed and counseled diet and exercise  DM II- unknown control  hyperlipidemia- unknown control  HTN- controlled on meds       Past Medical History  Disease Name Date Onset Notes   Allergic rhinitis --  --    Anxiety --  --    Arthritis --  --    Asthma --  --    Benign enlargement of prostate --  --    Chronic back pain --  --    COPD (chronic obstructive pulmonary disease) --  --    Depression --  --    Diabetes --  --    GERD (gastroesophageal reflux disease) --  --    Hypercholesterolemia --  --    Hyperlipidemia --  --    Insomnia --  --    Low Testosterone --  --    Peripheral neuropathy --  --    Sleep apnea --  --    Vitamin D Deficiency --  --          Past Surgical History  Procedure Name Date Notes   Total hip replacement --  --          Medication List  Name Date Started Instructions   aspirin 81 mg oral tablet,chewable  chew 1 tablet (81 mg) by oral route once daily   fenofibrate nanocrystallized 145 mg oral tablet 07/02/2018 take 1 tablet by mouth at bedtime   Fish Oil 1,000 mg (120 mg-180 mg) oral capsule  --    FreeStyle Test miscellaneous strip  use as directed   hydrochlorothiazide 12.5 mg oral tablet 07/02/2018 TAKE 1 TABLET DAILY.   Januvia 100 mg oral tablet 04/02/2018 take 1 tablet by mouth once daily   losartan 50 mg oral tablet  take 1 tablet (50 mg) by oral route once daily   metformin 500 mg oral tablet 08/27/2018 TAKE ONE TABLET BY MOUTH TWICE A DAY   montelukast 10 mg  "oral tablet 07/02/2018 take 1 tablet by mouth once daily   pravastatin 40 mg oral tablet 08/13/2018 take 1 and 1/2 tablets by mouth once daily   venlafaxine 75 mg oral capsule,extended release 24hr 04/02/2018 TAKE ONE CAPSULE BY MOUTH ONCE A DAY   Vitamin D3 2,000 unit oral capsule 04/02/2018 take 1 tablet by mouth daily         Allergy List  Allergen Name Date Reaction Notes   PENICILLINS --  --  --    Red Meats --  --  --          Social History  Finding Status Start/Stop Quantity Notes   Alcohol Current some day --/-- <7 weekly --    Tobacco Former --/40 --  --          Immunizations  NameDate Admin Mfg Trade Name Lot Number Route Inj VIS Given VIS Publication   [id number]/05/2017 UNK Unknown TradeName [id number] NE NE 04/02/2018 08/07/2015   Comments:          Review of Systems  · Constitutional  o Denies  o : fatigue, fever  · Cardiovascular  o Denies  o : chest pain, palpitations  · Respiratory  o Denies  o : shortness of breath, cough  · Gastrointestinal  o Denies  o : nausea, vomiting, diarrhea  · Psychiatric  o Denies  o : anxiety, depression      Vitals  Date Time BP Position Site L\R Cuff Size HR RR TEMP(F) WT  HT  BMI kg/m2 BSA m2 O2 Sat HC       10/18/2018 10:01 /80 Sitting    86 - R  96.8 260lbs 2oz 5'  9\" 38.41 2.4 97 %           Physical Examination  · Constitutional  o Appearance  o : well developed, well-nourished, in no acute distress  · Respiratory  o Respiratory Effort  o : breathing unlabored  o Auscultation of Lungs  o : clear to ascultation  · Cardiovascular  o Heart  o :   § Auscultation of Heart  § : regular rate and rhythm  o Peripheral Vascular System  o :   § Extremities  § : no edema  · Gastrointestinal  o Abdomen  o : soft, non-tender, non-distended, + bowel sounds, no hepatosplenomegaly, no masses palpated  · Musculoskeletal  o General  o :   § General Musculoskeletal  § : No joint swelling or deformity., Muscle tone, strength, and development grossly normal.  · Neurologic  o Gait " and Station  o :   § Gait Screening  § : normal gait  · Psychiatric  o Mood and Affect  o : mood normal, affect appropriate          Assessment  · Diabetes mellitus, type 2     250.00/E11.9  · Essential hypertension     401.9/I10  · Hyperlipidemia     272.4/E78.5      Plan  · Orders  o ACO-41: Dilated Diabetic eye exam completed this year and results in chart/reviewed (2022F) - 250.00/E11.9 - 10/18/2018  o CBC with Auto Diff [facility] (40442) - 250.00/E11.9, 401.9/I10, 272.4/E78.5 - 10/18/2018  o CMP [facility] (92960) - 250.00/E11.9, 401.9/I10, 272.4/E78.5 - 10/18/2018  o Hgb A1c [facility] (66843) - 250.00/E11.9, 401.9/I10, 272.4/E78.5 - 10/18/2018  o Lipid Panel [facility] (33132) - 250.00/E11.9, 401.9/I10, 272.4/E78.5 - 10/18/2018  o Urine microalbumin (58479) - 250.00/E11.9, 401.9/I10, 272.4/E78.5 - 10/18/2018  o ACO-14: Influenza immunization administered or previously received () - - 10/18/2018  o ACO-39: Current medications updated and reviewed () - - 10/18/2018  · Medications  o metformin 1,000 mg oral tablet   SIG: take 1 tablet (1,000 mg) by oral route 2 times per day with morning and evening meals for 30 days   DISP: (60) tablets with 5 refills  Adjusted on 10/18/2018     · Instructions  o Advised that cheeses and other sources of dairy fats, animal fats, fast food, and the extras (candy, pasteries, pies, doughnuts and cookies) all contain LDL raising nutrients. Advised to increase fruits, vegetables, whole grains, and to monitor portion sizes.   o Handouts were given to patient: diet.  o Patient instructed/educated on their diet and exercise program.  o Patient was educated/instructed on their diagnosis, treatment and medications prior to discharge from the clinic today.            Electronically Signed by: Wei Lauren MD -Author on October 18, 2018 10:51:17 AM

## 2021-05-07 NOTE — PROGRESS NOTES
Progress Note      Patient Name: Robby Hernandes   Patient ID: [id number]   Sex: Male   YOB: 1970        Visit Date: April 2, 2018    Provider: Wei Lauren MD   Location: [facility]   Location Address: [address] [place] [address]  [id number]   Location Phone: [phone]          Chief Complaint     here for lab results       History Of Present Illness  Robby Hernandes is a 48 year old /White male who presents for evaluation and treatment of:      disc used labs  hyperlipidemia uncontrolled  DM II- controlled  HTN- controlled       Past Medical History  Disease Name Date Onset Notes   Allergic rhinitis --  --    Anxiety --  --    Arthritis --  --    Asthma --  --    Benign enlargement of prostate --  --    Chronic back pain --  --    COPD (chronic obstructive pulmonary disease) --  --    Depression --  --    Diabetes --  --    GERD (gastroesophageal reflux disease) --  --    Hypercholesterolemia --  --    Hyperlipidemia --  --    Insomnia --  --    Low Testosterone --  --    Peripheral neuropathy --  --    Sleep apnea --  --    Vitamin D Deficiency --  --          Past Surgical History  Procedure Name Date Notes   Total hip replacement --  --          Medication List  Name Date Started Instructions   aspirin 81 mg oral tablet,chewable  chew 1 tablet (81 mg) by oral route once daily   fenofibrate nanocrystallized 145 mg oral tablet 03/05/2018 take 1 tablet by mouth at bedtime   Fish Oil 1,000 mg (120 mg-180 mg) oral capsule  --    FreeStyle Test miscellaneous strip  use as directed   hydrochlorothiazide 12.5 mg oral tablet 03/05/2018 TAKE 1 TABLET DAILY.   Januvia 100 mg oral tablet 02/01/2018 take 1 tablet by mouth once daily   losartan 50 mg oral tablet  take 1 tablet (50 mg) by oral route once daily   metformin 500 mg oral tablet  take 2 tablets (1,000 mg) by oral route 2 times per day with morning and evening meals   montelukast 10 mg oral tablet  03/05/2018 take 1 tablet by mouth once daily   pravastatin 40 mg oral tablet 12/04/2017 take 1.5 tablets by oral route daily for 30 days   venlafaxine 75 mg oral capsule,extended release 24hr 02/01/2018 TAKE ONE CAPSULE BY MOUTH ONCE A DAY   Vitamin D3 2,000 unit oral capsule 02/01/2018 take 1 tablet by mouth daily         Allergy List  Allergen Name Date Reaction Notes   PENICILLINS --  --  --          Social History  Finding Status Start/Stop Quantity Notes   Alcohol Current some day --/-- <7 weekly --    Tobacco Former --/-- --  --          Immunizations  NameDate Admin Mfg Trade Name Lot Number Route Inj VIS Given VIS Publication   Ualjozbip96/05/2017 UNK Unknown TradeName [id number] NE NE 04/02/2018 08/07/2015   Comments:          Review of Systems  · Constitutional  o Denies  o : fatigue, fever  · Cardiovascular  o Denies  o : chest pain, palpitations  · Respiratory  o Denies  o : shortness of breath, cough  · Gastrointestinal  o Denies  o : nausea, vomiting, diarrhea      Vitals  Date Time BP Position Site L\R Cuff Size HR RR TEMP(F) WT  HT  BMI kg/m2 BSA m2 O2 Sat        04/02/2018 11:28 /78 Sitting    113 - R  97 257lbs 4oz    95 %           Physical Examination  · Constitutional  o Appearance  o : well developed, well-nourished, in no acute distress  · Respiratory  o Respiratory Effort  o : breathing unlabored  o Auscultation of Lungs  o : clear to ascultation  · Cardiovascular  o Heart  o :   § Auscultation of Heart  § : regular rate and rhythm  · Musculoskeletal  o General  o :   § General Musculoskeletal  § : No joint swelling or deformity., Muscle tone, strength, and development grossly normal.  · Skin and Subcutaneous Tissue  o General Inspection  o : no lesions present, no areas of discoloration, skin turgor normal, texture normal  · Neurologic  o Mental Status Examination  o :   § Orientation  § : grossly oriented to person, place and time  o Gait and Station  o :   § Gait Screening  § : normal  gait  · Left DM Foot Exam  o Sensation  o : normal sensory exam perceptible to 10-gram nylon monofilament exam (5/5), vibration and light touch.  o Visual Inspection  o : visual inspection is normal with no signs of breakdown, ulcerations or deformities unless otherwise noted.   o Vascular  o : palpable dorsalis pedis and posterir tibialis pulses present, normal capillary refill.  · Right DM Foot Exam  o Sensation  o : normal sensory exam perceptible to 10-gram nylon monofilament exam (5/5), vibration and light touch.  o Visual Inspection  o : visual inspection is normal with no signs of breakdown, ulcerations or deformities unless otherwise noted.   o Vascular  o : palpable dorsalis pedis and posterir tibialis pulses present, normal capillary refill.              Assessment  · Diabetes mellitus, type 2     250.00/E11.9  · Hyperlipidemia     272.4/E78.5      Plan  · Orders  o OPHTHALMOLOGY CONSULTATION (OPHTH) - 250.00/E11.9 - 04/02/2018  o ACO-27: Most recent 2017 HgbA1c less than 7 [facility] (3044F) - 250.00/E11.9 - 04/02/2018  o Diabetic Foot (Motor and Sensory) Exam Completed [facility] (, , 2028F) - 250.00/E11.9 - 04/02/2018  o Most recent hemoglobin a1c (hba1c) level less than 7 (3044F) - - 04/02/2018  o ACO-39: Current medications updated and reviewed () - - 04/02/2018  o ACO-18: Screening for clinical depression not completed due to current diagnosis of depression or bipolar disorder () - - 04/02/2018  o Influenza immunization was not ordered or administered for reasons documented by clinician () - - 04/02/2018  · Instructions  o Advised that cheeses and other sources of dairy fats, animal fats, fast food, and the extras (candy, pasteries, pies, doughnuts and cookies) all contain LDL raising nutrients. Advised to increase fruits, vegetables, whole grains, and to monitor portion sizes.   o Handouts were given to patient: diet.  o Take all medications as prescribed/directed.  o Patient  instructed/educated on their diet and exercise program.  o Patient was educated/instructed on their diagnosis, treatment and medications prior to discharge from the clinic today.            Electronically Signed by: Wei Lauren MD -Author on April 2, 2018 05:03:25 PM

## 2021-05-07 NOTE — PROGRESS NOTES
Progress Note      Patient Name: Robby Hernandes   Patient ID: [id number]   Sex: Male   YOB: 1970        Visit Date: December 16, 2019    Provider: Beto Dawson DO   Location: [facility] [place]   Location Address: [address] [place] [address] [place]  [address]   Location Phone: [phone]          Chief Complaint     F/u on wound on back.       History Of Present Illness  Robby Hernandes is a 49 year old /White male who presents for evaluation and treatment of:      1.) Wound packing : The patient presents for wound packing. He is s/p incision and drainage of a cyst of his right upper back. Today he presents doing well. He presents with no significant complaints.       Past Medical History  Disease Name Date Onset Notes   Allergic rhinitis --  --    Anxiety --  --    Arthritis --  --    Asthma --  --    Benign enlargement of prostate --  --    Chronic back pain --  --    COPD (chronic obstructive pulmonary disease) --  --    Depression --  --    Diabetes mellitus, type 2 07/08/2019 --    Essential hypertension 07/08/2019 --    GERD (gastroesophageal reflux disease) --  --    Hyperlipidemia 07/08/2019 --    Insomnia --  --    Low Testosterone --  --    Peripheral neuropathy --  --    Sleep apnea --  --    Vitamin D Deficiency --  --          Past Surgical History  Procedure Name Date Notes   Total hip replacement --  --          Medication List  Name Date Started Instructions   doxycycline monohydrate 100 mg oral capsule 12/09/2019 Take 1 tablet by mouth twice a day until finished   fenofibrate nanocrystallized 145 mg oral tablet 12/03/2019 take 1 tablet by mouth at bedtime   Fish Oil 1,000 mg (120 mg-180 mg) oral capsule  --    FreeStyle Test miscellaneous strip  use as directed   hydrochlorothiazide 12.5 mg oral tablet 12/17/2019 TAKE 1 TABLET DAILY.   Januvia 100 mg oral tablet 12/03/2019 take 1 tablet by mouth once daily   losartan 100 mg oral tablet 07/08/2019 take 1  tablet (100 mg) by oral route once daily for 30 days   metformin 1,000 mg oral tablet 12/03/2019 take 1 tablet (1,000 mg) by oral route 2 times per day with morning and evening meals for 30 days   montelukast 10 mg oral tablet 12/03/2019 take 1 tablet by mouth once daily   pravastatin 20 mg oral tablet 07/08/2019 take 1 tablet (20 mg) by oral route once daily for 30 days   pravastatin 40 mg oral tablet 07/08/2019 take 1 tablet by oral route daily for 30 days   venlafaxine 75 mg oral capsule,extended release 24hr 12/03/2019 TAKE ONE CAPSULE BY MOUTH ONCE A DAY   Vitamin D3 2,000 unit oral capsule 07/08/2019 take 1 tablet by mouth daily         Allergy List  Allergen Name Date Reaction Notes   Red Meats --  --  --        Allergies Reconciled  Social History  Finding Status Start/Stop Quantity Notes   Alcohol Current some day --/-- <7 weekly --    Tobacco Former --/40 --  --          Immunizations  NameDate Admin Mfg Trade Name Lot Number Route Inj VIS Given VIS Publication   Lparyufms58/03/2018 UNK Unknown TradeName [id number] NE NE 10/18/2018 08/07/2015   Comments:    Svadvsfhe73/05/2017 UNK Unknown TradeName [id number] NE NE 04/02/2018 08/07/2015   Comments:    Tdap01/07/2019 NE Not Entered  NE NE 01/08/2019    Comments: [facility]         Review of Systems  · Constitutional  o Denies  o : fever, chills  · Eyes  o Denies  o : discharge from eye, eye discomfort  · HENT  o Denies  o : lightheadedness, nasal congestion  · Cardiovascular  o Denies  o : chest pain, syncope  · Respiratory  o Denies  o : shortness of breath, wheezing  · Gastrointestinal  o Denies  o : nausea, vomiting  · Integument  o Admits  o : wound of right upper back s/p incision and drainage of cyst  · Musculoskeletal  o Denies  o : limitation of motion, muscular weakness  · Psychiatric  o Denies  o : delusions, feeling confused      Vitals  Date Time BP Position Site L\R Cuff Size HR RR TEMP (F) WT  HT  BMI kg/m2 BSA m2 O2 Sat        12/16/2019 02:45 PM  134/84 Sitting    95 - R  96.4 248lbs 2oz    98 %          Physical Examination  · Constitutional  o Appearance  o : well developed, well-nourished, in no acute distress  · Head and Face  o HEENT  o : Unremarkable  · Eyes  o Conjunctivae  o : conjunctivae normal  · Neck  o Inspection/Palpation  o : supple  · Respiratory  o Respiratory Effort  o : breathing unlabored  · Cardiovascular  o Peripheral Vascular System  o :   § Extremities  § : no edema  · Musculoskeletal  o General  o :   § General Musculoskeletal  § : No joint swelling or deformity.  · Skin and Subcutaneous Tissue  o General Inspection  o : linear well healing wound noted of right upper back region, packing removed; slight erythema of proximal pain; no drainage appreciated; no pain with palpation; packing replaced.  · Neurologic  o Gait and Station  o :   § Gait Screening  § : normal gait  · Psychiatric  o Mood and Affect  o : mood normal, affect appropriate              Assessment  · Healing wound     959.9/T14.90XD    A.) Wound packed here in clinic; anticipatory guidance provided; return in 2 days.        Plan  · Orders  o ACO-39: Current medications updated and reviewed () - - 12/16/2019  · Medications  o Medications have been Reconciled  o Transition of Care or Provider Policy  · Instructions  o Patient was educated/instructed on their diagnosis, treatment and medications prior to discharge from the clinic today.            Electronically Signed by: Beto Dawson DO - on December 18, 2019 11:34:12 AM

## 2021-05-07 NOTE — PROGRESS NOTES
Progress Note      Patient Name: Robby Hernandes   Patient ID: [id number]   Sex: Male   YOB: 1970        Visit Date: December 13, 2019    Provider: Beto Dawson DO   Location: [facility]   Location Address: [address] [place] [address] [place]  [address]   Location Phone: [phone]          Chief Complaint     F/U wound on back       History Of Present Illness  Robby Hernandes is a 49 year old /White male who presents for evaluation and treatment of:      1.) F/U wound : The patient presents for a follow up regarding a wound requiring packing. Patient is status post incision and drainage of a sebaceous cyst. He presents today reporting no complaints with the area since his last visit 2 days ago. He is due for a packing change.       Past Medical History  Disease Name Date Onset Notes   Allergic rhinitis --  --    Anxiety --  --    Arthritis --  --    Asthma --  --    Benign enlargement of prostate --  --    Chronic back pain --  --    COPD (chronic obstructive pulmonary disease) --  --    Depression --  --    Diabetes --  --    Diabetes mellitus, type 2 07/08/2019 --    Essential hypertension 07/08/2019 --    GERD (gastroesophageal reflux disease) --  --    Hypercholesterolemia --  --    Hyperlipidemia --  --    Hyperlipidemia 07/08/2019 --    Insomnia --  --    Low Testosterone --  --    Peripheral neuropathy --  --    Sleep apnea --  --    Vitamin D Deficiency --  --          Past Surgical History  Procedure Name Date Notes   Total hip replacement --  --          Medication List  Name Date Started Instructions   aspirin 81 mg oral tablet,chewable  chew 1 tablet (81 mg) by oral route once daily   doxycycline monohydrate 100 mg oral capsule 12/09/2019 Take 1 tablet by mouth twice a day until finished   fenofibrate nanocrystallized 145 mg oral tablet 12/03/2019 take 1 tablet by mouth at bedtime   Fish Oil 1,000 mg (120 mg-180 mg) oral capsule  --    FreeStyle Test  miscellaneous strip  use as directed   hydrochlorothiazide 12.5 mg oral tablet 07/08/2019 TAKE 1 TABLET DAILY.   Januvia 100 mg oral tablet 12/03/2019 take 1 tablet by mouth once daily   losartan 100 mg oral tablet 07/08/2019 take 1 tablet (100 mg) by oral route once daily for 30 days   metformin 1,000 mg oral tablet 12/03/2019 take 1 tablet (1,000 mg) by oral route 2 times per day with morning and evening meals for 30 days   montelukast 10 mg oral tablet 12/03/2019 take 1 tablet by mouth once daily   pravastatin 20 mg oral tablet 07/08/2019 take 1 tablet (20 mg) by oral route once daily for 30 days   pravastatin 40 mg oral tablet 07/08/2019 take 1 tablet by oral route daily for 30 days   venlafaxine 75 mg oral capsule,extended release 24hr 12/03/2019 TAKE ONE CAPSULE BY MOUTH ONCE A DAY   Vitamin D3 2,000 unit oral capsule 07/08/2019 take 1 tablet by mouth daily         Allergy List  Allergen Name Date Reaction Notes   Red Meats --  --  --        Allergies Reconciled  Social History  Finding Status Start/Stop Quantity Notes   Alcohol Current some day --/-- <7 weekly --    Tobacco Former --/40 --  --          Immunizations  NameDate Admin Mfg Trade Name Lot Number Route Inj VIS Given VIS Publication   Ylyjqmtih47/03/2018 UNK Unknown TradeName [id number] NE NE 10/18/2018 08/07/2015   Comments:    Leituaotc41/05/2017 UNK Unknown TradeName [id number] NE NE 04/02/2018 08/07/2015   Comments:    Tdap01/07/2019 NE Not Entered  NE NE 01/08/2019    Comments: [facility]         Review of Systems  · Constitutional  o Denies  o : fatigue, night sweats  · Eyes  o Denies  o : double vision, blurred vision  · HENT  o Denies  o : vertigo, recent head injury  · Cardiovascular  o Denies  o : chest pain, irregular heart beats  · Respiratory  o Denies  o : shortness of breath, productive cough  · Gastrointestinal  o Denies  o : nausea, vomiting  · Genitourinary  o Denies  o : dysuria, urinary retention  · Neurologic  o Denies  o : altered mental  status, seizures  · Musculoskeletal  o Denies  o : joint swelling, limitation of motion  · Endocrine  o Denies  o : cold intolerance, heat intolerance  · Heme-Lymph  o Denies  o : petechiae, lymph node enlargement or tenderness  · Allergic-Immunologic  o Denies  o : frequent illnesses      Vitals  Date Time BP Position Site L\R Cuff Size HR RR TEMP (F) WT  HT  BMI kg/m2 BSA m2 O2 Sat HC       12/13/2019 10:27 /82 Sitting    87 - R  96.8 241lbs 16oz    98 %          Physical Examination  · Constitutional  o Appearance  o : well developed, well-nourished, in no acute distress  · Head and Face  o HEENT  o : Unremarkable  · Eyes  o Conjunctivae  o : conjunctivae normal  o Pupils and Irises  o : pupils equal and round  · Neck  o Inspection/Palpation  o : supple  · Respiratory  o Respiratory Effort  o : breathing unlabored  · Cardiovascular  o Peripheral Vascular System  o :   § Extremities  § : no edema  · Lymphatic  o Neck  o : no lymphadenopathy present  · Musculoskeletal  o General  o :   § General Musculoskeletal  § : No joint swelling or deformity.  · Skin and Subcutaneous Tissue  o General Inspection  o : linear well healing wound noted of right upper back region; no drainage appreciated; no overt tenderness with palpation appreciated; packing removed and replaced with no complications  · Neurologic  o Gait and Station  o :   § Gait Screening  § : normal gait  · Psychiatric  o Mood and Affect  o : mood normal, affect appropriate              Assessment  · Wound healing well on examination     782.9/Z48.00    A.) Wound appears to be healing well; packing changed; return on 12.16 for packing change and re-evaluation.      Plan  · Orders  o ACO-39: Current medications updated and reviewed () - - 12/13/2019  · Medications  o Medications have been Reconciled  o Transition of Care or Provider Policy  · Instructions  o Patient was educated/instructed on their diagnosis, treatment and medications prior to  discharge from the clinic today.            Electronically Signed by: Beto Dawson DO - on December 13, 2019 11:09:40 AM

## 2021-05-07 NOTE — PROGRESS NOTES
"   Progress Note      Patient Name: Robby Hernandes   Patient ID: [id number]   Sex: Male   YOB: 1970        Visit Date: June 8, 2018    Provider: MADISON Lee   Location: [facility] [place]   Location Address: [address] [place] [address]  [id number]   Location Phone: [phone]          Chief Complaint     right elbow had tick bite about three weeks ago. has been weak and achy for the past week and a half to two weeks.       History Of Present Illness  Robby Hernandes is a 48 year old /White male who presents for evaluation and treatment of:      tick bite about 3 weeks ago to the right elbow - for the past 2-3 weeks increased body aches and joint pain - upset stomach and feels \"blah\" with no energy - denies significant rash with the tick bite -   States an MD to you saw his brother yesterday and told pt he also should see his provider for his tick bite - lives [place] to an [place]mowed piece of property     Chronic shoulder pain that is much worse, chronic back, right hip and bilateral ankle and left knee and bilateral wrist.       Past Medical History  Disease Name Date Onset Notes   Allergic rhinitis --  --    Anxiety --  --    Arthritis --  --    Asthma --  --    Benign enlargement of prostate --  --    Chronic back pain --  --    COPD (chronic obstructive pulmonary disease) --  --    Depression --  --    Diabetes --  --    GERD (gastroesophageal reflux disease) --  --    Hypercholesterolemia --  --    Hyperlipidemia --  --    Insomnia --  --    Low Testosterone --  --    Peripheral neuropathy --  --    Sleep apnea --  --    Vitamin D Deficiency --  --          Past Surgical History  Procedure Name Date Notes   Total hip replacement --  --          Medication List  Name Date Started Instructions   aspirin 81 mg oral tablet,chewable  chew 1 tablet (81 mg) by oral route once daily   fenofibrate nanocrystallized 145 mg oral tablet 04/30/2018 take 1 tablet by mouth at " bedtime   Fish Oil 1,000 mg (120 mg-180 mg) oral capsule  --    FreeStyle Test miscellaneous strip  use as directed   hydrochlorothiazide 12.5 mg oral tablet 04/30/2018 TAKE 1 TABLET DAILY.   Januvia 100 mg oral tablet 04/02/2018 take 1 tablet by mouth once daily   losartan 50 mg oral tablet  take 1 tablet (50 mg) by oral route once daily   metformin 500 mg oral tablet 04/30/2018 TAKE ONE TABLET BY MOUTH TWICE A DAY   montelukast 10 mg oral tablet 04/30/2018 take 1 tablet by mouth once daily   pravastatin 40 mg oral tablet 12/04/2017 take 1.5 tablets by oral route daily for 30 days   venlafaxine 75 mg oral capsule,extended release 24hr 04/02/2018 TAKE ONE CAPSULE BY MOUTH ONCE A DAY   Vitamin D3 2,000 unit oral capsule 04/02/2018 take 1 tablet by mouth daily         Allergy List  Allergen Name Date Reaction Notes   PENICILLINS --  --  --          Social History  Finding Status Start/Stop Quantity Notes   Alcohol Current some day --/-- <7 weekly --    Tobacco Former --/40 --  --          Immunizations  NameDate Admin Mfg Trade Name Lot Number Route Inj VIS Given VIS Publication   Jjjvzbeqv74/05/2017 UNK Unknown TradeName [id number] NE NE 04/02/2018 08/07/2015   Comments:          Review of Systems  · Constitutional  o Admits  o : fatigue, chills, body aches  o Denies  o : fever  · Gastrointestinal  o Admits  o : nausea with eating or drinking  · Integument  o * See HPI  · Musculoskeletal  o * See HPI      Vitals  Date Time BP Position Site L\R Cuff Size HR RR TEMP(F) WT  HT  BMI kg/m2 BSA m2 O2 Sat        06/08/2018 10:32 /84 Sitting    108 - R  97.4 255lbs 6oz    96 %           Physical Examination  · Constitutional  o Appearance  o : well developed, well-nourished, in no acute distress  · Eyes  o Conjunctivae  o : conjunctivae normal  o Pupils and Irises  o : pupils equal and round, pupils reactive to light bilaterally  · Neck  o Inspection/Palpation  o : supple  o Thyroid  o : no  thyromegaly  · Respiratory  o Respiratory Effort  o : breathing unlabored  o Auscultation of Lungs  o : clear to ascultation  · Cardiovascular  o Heart  o :   § Auscultation of Heart  § : regular rate and rhythm  o Peripheral Vascular System  o :   § Extremities  § : no edema  · Lymphatic  o Neck  o : no lymphadenopathy present  · Musculoskeletal  o General  o :   § General Musculoskeletal  § : No joint swelling or deformity. Muscle tone, strength, and development grossly normal.  · Skin and Subcutaneous Tissue  o General Inspection  o : 1mm scab to the right medical elbow without surrounding warmth, erythema or rash  · Neurologic  o Gait and Station  o :   § Gait Screening  § : normal gait  · Psychiatric  o Mood and Affect  o : mood normal, affect appropriate              Assessment  · Tick bite       Bitten or stung by nonvenomous insect and other nonvenomous arthropods, initial encounter     919.4/W57.XXXA  · Body aches     780.96/R52  · Nausea     787.02/R11.0      Plan  · Orders  o CBC with Auto Diff HM (09749) - 919.4/W57.XXXA, 780.96/R52, 787.02/R11.0 - 06/08/2018  o CMP HM (56314) - 919.4/W57.XXXA, 780.96/R52, 787.02/R11.0 - 06/08/2018  o ACO-39: Current medications updated and reviewed () - - 06/08/2018  o Galactose-alpha-1,3-galactose specific IgE antibody assay (70984) - - 06/08/2018  o [place] Spotted Fever IgG [facility] (65377) - 919.4/W57.XXXA, 780.96/R52, 787.02/R11.0 - 06/08/2018  o [place] Spotted Fever IgM [facility] (96974SRM) - 919.4/W57.XXXA, 780.96/R52, 787.02/R11.0 - 06/08/2018  o Lyme Disease (IgG/IgM Total by EIA) (09392) - 919.4/W57.XXXA, 780.96/R52, 787.02/R11.0 - 06/08/2018  · Medications  o doxycycline hyclate 100 mg oral tablet   SIG: take 1 tablet by oral route 2 times a day for 10 days   DISP: (20) tablets with 0 refills  Prescribed on 06/08/2018     · Instructions  o Take all medications as prescribed/directed.  o Patient was educated/instructed on their diagnosis, treatment  and medications prior to discharge from the clinic today.  o Chronic conditions reviewed and taken into consideration for today's treatment plan.  · Disposition  o Call or Return if symptoms worsen or persist.            Electronically Signed by: MADISON Lee - on June 8, 2018 11:17:04 AM

## 2021-05-07 NOTE — PROGRESS NOTES
Progress Note      Patient Name: Robby Hernandes   Patient ID: [id number]   Sex: Male   YOB: 1970        Visit Date: March 9, 2021    Provider: Wei Lauren MD   Location: [facility] - [place], [place]   Location Address: [address] [place] [address] [place]  [address]   Location Phone: [phone]          Chief Complaint     refills       History Of Present Illness  Robby Hernandes is a 51 year old /White male who presents for evaluation and treatment of:      pt has been seeing ortho for hip pain- pt still having hip pain and ortho wants him to see neurology  need refills and labs  HTN- uncontrolled  DM II- unknown control- need labs to assess  hyperlipidemia- unknown control- need labs to assess  pt tolerating meds well  anxiety/depression- controlled on med  pt has had cold symptoms x 2-3 weeks- sudden onset- worsening symptoms- pt not taking any meds for condition       Past Medical History  Disease Name Date Onset Notes   Allergic rhinitis --  --    Anxiety --  --    Arthritis --  --    Asthma --  --    Benign enlargement of prostate --  --    Chronic back pain --  --    COPD (chronic obstructive pulmonary disease) --  --    Depression --  --    Diabetes mellitus, type 2 07/08/2019 --    Essential hypertension 07/08/2019 --    GERD (gastroesophageal reflux disease) --  --    Hyperlipidemia 07/08/2019 --    Insomnia --  --    Low Testosterone --  --    Peripheral neuropathy --  --    Sleep apnea --  --    Vitamin D Deficiency --  --          Past Surgical History  Procedure Name Date Notes   Total hip replacement --  --          Medication List  Name Date Started Instructions   fenofibrate nanocrystallized 145 mg oral tablet 03/09/2020 take 1 tablet by mouth at bedtime   Fish Oil 1,000 mg (120 mg-180 mg) oral capsule  --    FreeStyle Test miscellaneous strip  use as directed   hydrochlorothiazide 12.5 mg oral tablet 09/18/2020 TAKE 1 TABLET BY MOUTH DAILY   Januvia 100 mg oral tablet  09/18/2020 TAKE 1 TABLET BY MOUTH EVERY DAY   losartan 100 mg oral tablet 07/24/2020 TAKE 1 TABLET(100 MG) BY MOUTH EVERY DAY   metformin 500 mg oral tablet 03/09/2020 TAKE 2 TABLETS BY MOUTH TWICE A DAY   pravastatin 20 mg oral tablet 09/18/2020 TAKE 1 TABLET(20 MG) BY MOUTH EVERY DAY   pravastatin 40 mg oral tablet 03/09/2020 take 1 tablet by oral route daily for 90 days   venlafaxine 75 mg oral capsule,extended release 24hr 09/18/2020 TAKE 1 CAPSULE BY MOUTH EVERY DAY   Vitamin D3 50 mcg (2,000 unit) oral capsule 03/09/2020 take 1 tablet by mouth daily         Allergy List  Allergen Name Date Reaction Notes   Red Meats --  --  --          Social History  Finding Status Start/Stop Quantity Notes   Alcohol Current some day --/-- <7 weekly --    Tobacco Former --/40 --  --          Immunizations  NameDate Admin Mfg Trade Name Lot Number Route Inj VIS Given VIS Publication   Aslawpjfi56/05/2019 UNK Unknown TradeName [id number] NE NE 03/09/2020    Comments: [facility]   Sgqbgisls6664/05/2019 MSD PNEUMOVAX 23 [id number] NE NE 03/09/2020    Comments: [facility]   Tdap01/07/2019 NE Not Entered  NE NE 01/08/2019    Comments: [facility]         Review of Systems  · Constitutional  o Denies  o : fatigue, fever  · HENT  o Admits  o : sinus pain, nasal congestion, nasal discharge  o Denies  o : sore throat  · Cardiovascular  o Denies  o : chest pain, palpitations  · Respiratory  o Admits  o : cough  o Denies  o : shortness of breath  · Gastrointestinal  o Denies  o : nausea, vomiting, diarrhea  · Psychiatric  o Denies  o : anxiety, depression      Vitals  Date Time BP Position Site L\R Cuff Size HR RR TEMP (F) WT  HT  BMI kg/m2 BSA m2 O2 Sat FR L/min FiO2 HC       03/09/2021 11:35 AM      79 - R  98 258lbs 0oz    99 %      03/09/2021 11:38 /78 Sitting                       Physical Examination  · Constitutional  o Appearance  o : well developed, well-nourished, in no acute distress  · Head and Face  o HEENT  o : MMM, erythema of  throat, uvula midline, throat open, no abscesses seen, bilateral maxillary sinus tenderness, TM's bilaterally clear  · Respiratory  o Respiratory Effort  o : breathing unlabored  o Auscultation of Lungs  o : clear to ascultation  · Cardiovascular  o Heart  o :   § Auscultation of Heart  § : regular rate and rhythm  o Peripheral Vascular System  o :   § Extremities  § : no edema  · Gastrointestinal  o Abdomen  o : soft, non-tender, non-distended, + bowel sounds, no hepatosplenomegaly, no masses palpated  · Musculoskeletal  o General  o :   § General Musculoskeletal  § : No joint swelling or deformity., Muscle tone, strength, and development grossly normal.  · Neurologic  o Gait and Station  o :   § Gait Screening  § : normal gait  · Psychiatric  o Mood and Affect  o : mood normal, affect appropriate  · Left DM Foot Exam  o Sensation  o : reduced sensation in some areas   o Visual Inspection  o : callous on sole, otherwise normal  o Vascular  o : palpable dorsalis pedis and posterir tibialis pulses present, normal capillary refill  · Right DM Foot Exam  o Sensation  o : reduced sensation in some areas   o Visual Inspection  o : normal except for callous on sole  o Vascular  o : palpable dorsalis pedis and posterir tibialis pulses present, normal capillary refill          Assessment  · Diabetes mellitus, type 2     250.00/E11.9  · Essential hypertension     401.9/I10  · Hyperlipidemia     272.4/E78.5  · Vitamin D deficiency     268.9/E55.9  · Leg pain     729.5/M79.606  · Screening PSA (prostate specific antigen)     V76.44/Z12.5  · Maxillary sinusitis     473.0/J32.0      Plan  · Orders  o CBC with Auto Diff [facility] (01358) - 250.00/E11.9 - 03/09/2021  o CMP [facility] (11672) - 250.00/E11.9 - 03/09/2021  o Hgb A1c [facility] (23129) - 250.00/E11.9 - 03/09/2021  o Lipid Panel [facility] (93405) - 250.00/E11.9 - 03/09/2021  o Urine microalbumin (21456) - 250.00/E11.9 - 03/09/2021  o Thyroid Profile (THYII) - 250.00/E11.9 -  03/09/2021  o PODIATRY CONSULTATION (PODIA) - 250.00/E11.9 - 03/09/2021   pt needs to see dr jacobson  o Diabetic Foot (Motor and Sensory) Exam [facility]pleted [facility] (, , 2028F) - 250.00/E11.9 - 03/09/2021  o Vitamin D (25-Hydroxy) Level (92404) - 268.9/E55.9 - 03/09/2021  o ACO-39: Current medications updated and reviewed (, 1159F) - - 03/09/2021  o NEUROLOGY CONSULTATION. (NEURO) - 729.5/M79.606 - 03/09/2021   not orapilla  o PSA Screening, Ultrasensitive, MEDIC[facility] () - V76.44/Z12.5 - 03/09/2021  o [facility] NCOV2 (send-out) (31510) - - 03/09/2021  · Medications  o atorvastatin 40 mg oral tablet   SIG: take 1 tablet (40 mg) by oral route once daily at bedtime for 90 days   DISP: (90) Tablet with 1 refills  Prescribed on 03/09/2021     o cefdinir 300 mg oral capsule   SIG: take 1 capsule (300 mg) by oral route every 12 hours for 7 days   DISP: (14) Capsule with 0 refills  Prescribed on 03/09/2021     o Probiotic 20 billion cell oral capsule   SIG: take 1 capsule by oral route 2 times a day for 10 days   DISP: (20) Capsule with 0 refills  Prescribed on 03/09/2021     o Ventolin HFA 90 mcg/actuation inhalation HFA aerosol inhaler   SIG: inhale 1 - 2 puffs (90 - 180 mcg) by inhalation route every 6 hours as needed for 30 days   DISP: (1) Inhaler with 1 refills  Prescribed on 03/09/2021     o fenofibrate nanocrystallized 145 mg oral tablet   SIG: take 1 tablet by mouth at bedtime   DISP: (90) Tablet with 1 refills  Adjusted on 03/09/2021     o hydrochlorothiazide 25 mg oral tablet   SIG: take 1 tablet (25 mg) by oral route once daily for 90 days   DISP: (90) Tablet with 1 refills  Adjusted on 03/09/2021     o Januvia 100 mg oral tablet   SIG: TAKE 1 TABLET BY MOUTH EVERY DAY   DISP: (90) Tablet with 1 refills  Adjusted on 03/09/2021     o losartan 100 mg oral tablet   SIG: TAKE 1 TABLET(100 MG) BY MOUTH EVERY DAY   DISP: (90) Tablet with 1 refills  Adjusted on 03/09/2021     o metformin 500 mg  oral tablet   SIG: TAKE 2 TABLETS BY MOUTH TWICE A DAY   DISP: (360) Tablet with 1 refills  Adjusted on 03/09/2021     o venlafaxine 75 mg oral capsule,extended release 24hr   SIG: TAKE 1 CAPSULE BY MOUTH EVERY DAY   DISP: (90) Capsule with 1 refills  Adjusted on 03/09/2021     o pravastatin 20 mg oral tablet   SIG: TAKE 1 TABLET(20 MG) BY MOUTH EVERY DAY   DISP: (90) Tablet with -1 refills  Discontinued on 03/09/2021     o pravastatin 40 mg oral tablet   SIG: take 1 tablet by oral route daily for 90 days   DISP: (90) Tablet with 1 refills  Discontinued on 03/09/2021     o Medications have been Reconciled  o Transition of Care or Provider Policy  · Instructions  o Advised that cheeses and other sources of dairy fats, animal fats, fast food, and the extras (candy, pasteries, pies, doughnuts and cookies) all contain LDL raising nutrients. Advised to increase fruits, vegetables, whole grains, and to monitor portion sizes.   o Patient was educated/instructed on their diagnosis, treatment and medications prior to discharge from the clinic today.            Electronically Signed by: Wei Lauren MD -Author on March 9, 2021 12:06:06 PM

## 2021-05-07 NOTE — PROGRESS NOTES
Progress Note      Patient Name: Robby Hernandes   Patient ID: [id number]   Sex: Male   YOB: 1970        Visit Date: July 19, 2018    Provider: Wei Lauren MD   Location: [facility], [place]   Location Address: [address] [place] [address]  [id number]   Location Phone: [phone]          Chief Complaint     follow up on labs       History Of Present Illness  Robby Hernandes is a 48 year old /White male who presents for evaluation and treatment of:      discussed labs  DM II- uncontrolled  hyperlipidemia- uncontrolled  discussed diet and exercise       Past Medical History  Disease Name Date Onset Notes   Allergic rhinitis --  --    Anxiety --  --    Arthritis --  --    Asthma --  --    Benign enlargement of prostate --  --    Chronic back pain --  --    COPD (chronic obstructive pulmonary disease) --  --    Depression --  --    Diabetes --  --    GERD (gastroesophageal reflux disease) --  --    Hypercholesterolemia --  --    Hyperlipidemia --  --    Insomnia --  --    Low Testosterone --  --    Peripheral neuropathy --  --    Sleep apnea --  --    Vitamin D Deficiency --  --          Past Surgical History  Procedure Name Date Notes   Total hip replacement --  --          Medication List  Name Date Started Instructions   aspirin 81 mg oral tablet,chewable  chew 1 tablet (81 mg) by oral route once daily   fenofibrate nanocrystallized 145 mg oral tablet 07/02/2018 take 1 tablet by mouth at bedtime   Fish Oil 1,000 mg (120 mg-180 mg) oral capsule  --    FreeStyle Test miscellaneous strip  use as directed   hydrochlorothiazide 12.5 mg oral tablet 07/02/2018 TAKE 1 TABLET DAILY.   Januvia 100 mg oral tablet 04/02/2018 take 1 tablet by mouth once daily   losartan 50 mg oral tablet  take 1 tablet (50 mg) by oral route once daily   metformin 500 mg oral tablet 04/30/2018 TAKE ONE TABLET BY MOUTH TWICE A DAY   montelukast 10 mg oral tablet 07/02/2018 take 1 tablet by mouth  once daily   pravastatin 40 mg oral tablet 12/04/2017 take 1.5 tablets by oral route daily for 30 days   venlafaxine 75 mg oral capsule,extended release 24hr 04/02/2018 TAKE ONE CAPSULE BY MOUTH ONCE A DAY   Vitamin D3 2,000 unit oral capsule 04/02/2018 take 1 tablet by mouth daily         Allergy List  Allergen Name Date Reaction Notes   PENICILLINS --  --  --    Red Meats --  --  --          Social History  Finding Status Start/Stop Quantity Notes   Alcohol Current some day --/-- <7 weekly --    Tobacco Former --/40 --  --          Immunizations  NameDate Admin Mfg Trade Name Lot Number Route Inj VIS Given VIS Publication   Quptskipc01/05/2017 UNK Unknown TradeName [id number] NE NE 04/02/2018 08/07/2015   Comments:          Review of Systems  · Constitutional  o Denies  o : fatigue, fever  · Cardiovascular  o Denies  o : chest pain, palpitations  · Respiratory  o Denies  o : shortness of breath, cough  · Gastrointestinal  o Denies  o : nausea, vomiting, diarrhea      Vitals  Date Time BP Position Site L\R Cuff Size HR RR TEMP(F) WT  HT  BMI kg/m2 BSA m2 O2 Sat        07/19/2018 10:44 /82 Sitting    96 - R  96.2 260lbs 8oz    98 %           Physical Examination  · Constitutional  o Appearance  o : well developed, well-nourished, in no acute distress  · Respiratory  o Respiratory Effort  o : breathing unlabored  o Auscultation of Lungs  o : clear to ascultation  · Cardiovascular  o Heart  o :   § Auscultation of Heart  § : regular rate and rhythm  o Peripheral Vascular System  o :   § Extremities  § : no edema  · Gastrointestinal  o Abdomen  o : soft, non-tender, non-distended, + bowel sounds, no hepatosplenomegaly, no masses palpated  · Musculoskeletal  o General  o :   § General Musculoskeletal  § : No joint swelling or deformity., Muscle tone, strength, and development grossly normal.  · Neurologic  o Gait and Station  o :   § Gait Screening  § : normal gait  · Psychiatric  o Mood and Affect  o : mood normal,  affect appropriate          Assessment  · Diabetes mellitus, type 2     250.00/E11.9  · Hyperlipidemia     272.4/E78.5      Plan  · Orders  o ACO-14: Influenza immunization administered or previously received () - - 07/19/2018  o ACO-16: BMI above normal range and follow-up plan is documented () - - 07/19/2018  o ACO-18: Depression screening not completed due to current diagnosis of depression or bipolar disorder () - - 07/19/2018  o ACO-39: Current medications updated and reviewed () - - 07/19/2018  · Medications  o metformin 500 mg oral tablet   SIG: take 2 tablets (1,000 mg) by oral route 2 times per day with morning and evening meals for 30 days   DISP: (120) Tablet with 5 refills  Adjusted on 07/19/2018     · Instructions  o Advised that cheeses and other sources of dairy fats, animal fats, fast food, and the extras (candy, pasteries, pies, doughnuts and cookies) all contain LDL raising nutrients. Advised to increase fruits, vegetables, whole grains, and to monitor portion sizes.   o Patient instructed/educated on their diet and exercise program.  o Patient was educated/instructed on their diagnosis, treatment and medications prior to discharge from the clinic today.            Electronically Signed by: Wei Lauren MD -Author on July 19, 2018 11:20:07 AM

## 2021-05-07 NOTE — PROGRESS NOTES
Progress Note      Patient Name: Robby Hernandes   Patient ID: [id number]   Sex: Male   YOB: 1970        Visit Date: December 18, 2019    Provider: Beto Dawson DO   Location: [facility]   Location Address: [address] [place] [address] [place]  [address]   Location Phone: [phone]          Chief Complaint     Wound check on back.       History Of Present Illness  Robby Hernandes is a 49 year old /White male who presents for evaluation and treatment of:      1.) Wound check :  The patient presents for wound packing. He is s/p incision and drainage of a cyst of his right upper back. Today he presents doing well. He presents with no significant complaints.       Past Medical History  Disease Name Date Onset Notes   Allergic rhinitis --  --    Anxiety --  --    Arthritis --  --    Asthma --  --    Benign enlargement of prostate --  --    Chronic back pain --  --    COPD (chronic obstructive pulmonary disease) --  --    Depression --  --    Diabetes mellitus, type 2 07/08/2019 --    Essential hypertension 07/08/2019 --    GERD (gastroesophageal reflux disease) --  --    Hyperlipidemia 07/08/2019 --    Insomnia --  --    Low Testosterone --  --    Peripheral neuropathy --  --    Sleep apnea --  --    Vitamin D Deficiency --  --          Past Surgical History  Procedure Name Date Notes   Total hip replacement --  --          Medication List  Name Date Started Instructions   doxycycline monohydrate 100 mg oral capsule 12/09/2019 Take 1 tablet by mouth twice a day until finished   fenofibrate nanocrystallized 145 mg oral tablet 12/03/2019 take 1 tablet by mouth at bedtime   Fish Oil 1,000 mg (120 mg-180 mg) oral capsule  --    FreeStyle Test miscellaneous strip  use as directed   hydrochlorothiazide 12.5 mg oral tablet 12/17/2019 TAKE 1 TABLET DAILY.   Januvia 100 mg oral tablet 12/03/2019 take 1 tablet by mouth once daily   losartan 100 mg oral tablet 07/08/2019 take 1 tablet  (100 mg) by oral route once daily for 30 days   metformin 1,000 mg oral tablet 12/03/2019 take 1 tablet (1,000 mg) by oral route 2 times per day with morning and evening meals for 30 days   montelukast 10 mg oral tablet 12/03/2019 take 1 tablet by mouth once daily   pravastatin 20 mg oral tablet 07/08/2019 take 1 tablet (20 mg) by oral route once daily for 30 days   pravastatin 40 mg oral tablet 07/08/2019 take 1 tablet by oral route daily for 30 days   venlafaxine 75 mg oral capsule,extended release 24hr 12/03/2019 TAKE ONE CAPSULE BY MOUTH ONCE A DAY   Vitamin D3 2,000 unit oral capsule 07/08/2019 take 1 tablet by mouth daily         Allergy List  Allergen Name Date Reaction Notes   Red Meats --  --  --          Social History  Finding Status Start/Stop Quantity Notes   Alcohol Current some day --/-- <7 weekly --    Tobacco Former --/40 --  --          Immunizations  NameDate Admin Mfg Trade Name Lot Number Route Inj VIS Given VIS Publication   Pwhhhcnxm51/03/2018 UNK Unknown TradeName [id number] NE NE 10/18/2018 08/07/2015   Comments:    Egcrscijw64/05/2017 UNK Unknown TradeName [id number] NE NE 04/02/2018 08/07/2015   Comments:    Tdap01/07/2019 NE Not Entered  NE NE 01/08/2019    Comments: [facility]         Review of Systems  · Constitutional  o Denies  o : fatigue, night sweats  · Eyes  o Denies  o : double vision, blurred vision  · HENT  o Denies  o : vertigo, recent head injury  · Cardiovascular  o Denies  o : chest pain, irregular heart beats  · Respiratory  o Denies  o : shortness of breath, productive cough  · Gastrointestinal  o Denies  o : nausea, vomiting  · Genitourinary  o Denies  o : dysuria, urinary retention  · Integument  o Admits  o : wound of right upper back  o Denies  o : hair growth change, new skin lesions  · Neurologic  o Denies  o : altered mental status, seizures  · Musculoskeletal  o Denies  o : joint swelling, limitation of motion  · Endocrine  o Denies  o : cold intolerance, heat  intolerance  · Heme-Lymph  o Denies  o : petechiae, lymph node enlargement or tenderness  · Allergic-Immunologic  o Denies  o : frequent illnesses      Vitals  Date Time BP Position Site L\R Cuff Size HR RR TEMP (F) WT  HT  BMI kg/m2 BSA m2 O2 Sat HC       12/18/2019 11:31 /84 Sitting    114 - R  97 248lbs 8oz    98 %          Physical Examination  · Constitutional  o Appearance  o : well developed, well-nourished, in no acute distress  · Head and Face  o HEENT  o : Unremarkable  · Eyes  o Conjunctivae  o : conjunctivae normal  · Neck  o Inspection/Palpation  o : supple  · Respiratory  o Respiratory Effort  o : breathing unlabored  · Cardiovascular  o Peripheral Vascular System  o :   § Extremities  § : no edema  · Lymphatic  o Neck  o : no lymphadenopathy present  · Musculoskeletal  o General  o :   § General Musculoskeletal  § : No joint swelling or deformity.  · Skin and Subcutaneous Tissue  o General Inspection  o : Linear wound noted at approximately 1 cm in length; healing well; no drainage appreciated; packing removed and replaced with no complications  · Neurologic  o Gait and Station  o :   § Gait Screening  § : normal gait  · Psychiatric  o Mood and Affect  o : mood normal, affect appropriate              Assessment  · Healing wound     959.9/T14.90XD    A.) Packing removed and replaced; likely last packing today; patient return in 2 days for removal of packing, where wound will likely be allowed to heal by secondary intention.         Plan  · Orders  o ACO-39: Current medications updated and reviewed () - - 12/18/2019  · Medications  o Medications have been Reconciled  o Transition of Care or Provider Policy  · Instructions  o Patient was educated/instructed on their diagnosis, treatment and medications prior to discharge from the clinic today.            Electronically Signed by: Beto Dawson DO -Author on December 18, 2019 11:43:41 AM

## 2021-05-07 NOTE — PROGRESS NOTES
Progress Note      Patient Name: Robby Hernandes   Patient ID: [id number]   Sex: Male   YOB: 1970        Visit Date: December 20, 2019    Provider: Beto Dawson DO   Location: [facility]   Location Address: [address] [place] [address] [place]  [address]   Location Phone: [phone]          Chief Complaint     F/U WOUND PACKING.       History Of Present Illness  Robby Hernandes is a 49 year old /White male who presents for evaluation and treatment of:      1.) F/U WOUND PACKING : The patient presents for wound care. He is s/p an I&D of a sebaceous cyst of his right upper back region. He reports that he is doing well. He presents with no overt complaints regarding his wound.       Past Medical History  Disease Name Date Onset Notes   Allergic rhinitis --  --    Anxiety --  --    Arthritis --  --    Asthma --  --    Benign enlargement of prostate --  --    Chronic back pain --  --    COPD (chronic obstructive pulmonary disease) --  --    Depression --  --    Diabetes mellitus, type 2 07/08/2019 --    Essential hypertension 07/08/2019 --    GERD (gastroesophageal reflux disease) --  --    Hyperlipidemia 07/08/2019 --    Insomnia --  --    Low Testosterone --  --    Peripheral neuropathy --  --    Sleep apnea --  --    Vitamin D Deficiency --  --          Past Surgical History  Procedure Name Date Notes   Total hip replacement --  --          Medication List  Name Date Started Instructions   doxycycline monohydrate 100 mg oral capsule 12/09/2019 Take 1 tablet by mouth twice a day until finished   fenofibrate nanocrystallized 145 mg oral tablet 12/03/2019 take 1 tablet by mouth at bedtime   Fish Oil 1,000 mg (120 mg-180 mg) oral capsule  --    FreeStyle Test miscellaneous strip  use as directed   hydrochlorothiazide 12.5 mg oral tablet 12/17/2019 TAKE 1 TABLET DAILY.   Januvia 100 mg oral tablet 12/03/2019 take 1 tablet by mouth once daily   losartan 100 mg oral tablet  07/08/2019 take 1 tablet (100 mg) by oral route once daily for 30 days   metformin 1,000 mg oral tablet 12/03/2019 take 1 tablet (1,000 mg) by oral route 2 times per day with morning and evening meals for 30 days   montelukast 10 mg oral tablet 12/03/2019 take 1 tablet by mouth once daily   pravastatin 20 mg oral tablet 07/08/2019 take 1 tablet (20 mg) by oral route once daily for 30 days   pravastatin 40 mg oral tablet 07/08/2019 take 1 tablet by oral route daily for 30 days   venlafaxine 75 mg oral capsule,extended release 24hr 12/03/2019 TAKE ONE CAPSULE BY MOUTH ONCE A DAY   Vitamin D3 2,000 unit oral capsule 07/08/2019 take 1 tablet by mouth daily         Allergy List  Allergen Name Date Reaction Notes   Red Meats --  --  --          Social History  Finding Status Start/Stop Quantity Notes   Alcohol Current some day --/-- <7 weekly --    Tobacco Former --/40 --  --          Immunizations  NameDate Admin Mfg Trade Name Lot Number Route Inj VIS Given VIS Publication   Guyzfxwal09/03/2018 UNK Unknown TradeName [id number] NE NE 10/18/2018 08/07/2015   Comments:    Ooeniixqq38/05/2017 UNK Unknown TradeName [id number] NE NE 04/02/2018 08/07/2015   Comments:    Tdap01/07/2019 NE Not Entered  NE NE 01/08/2019    Comments: [facility]         Review of Systems  · Constitutional  o Denies  o : fever, chills  · Eyes  o Denies  o : discharge from eye, eye discomfort  · HENT  o Denies  o : lightheadedness, nasal congestion  · Cardiovascular  o Denies  o : chest pain, syncope  · Respiratory  o Denies  o : wheezing, cough  · Gastrointestinal  o Denies  o : nausea, vomiting  · Integument  o Admits  o : wound (right upper back region)  o Denies  o : rash  · Neurologic  o Denies  o : altered mental status, tingling or numbness  · Musculoskeletal  o Denies  o : joint pain, joint swelling  · Psychiatric  o Denies  o : delusions, feeling confused      Vitals  Date Time BP Position Site L\R Cuff Size HR RR TEMP (F) WT  HT  BMI kg/m2 BSA m2 O2  "Sat HC       12/20/2019 11:29 /76 Sitting    78 - R  96.2 252lbs 8oz 5'  10.5\" 35.72 2.39 98 %          Physical Examination  · Constitutional  o Appearance  o : well developed, well-nourished, in no acute distress  · Head and Face  o HEENT  o : Unremarkable  · Eyes  o Conjunctivae  o : conjunctivae normal  o Pupils and Irises  o : pupils equal and round  · Neck  o Inspection/Palpation  o : supple  · Respiratory  o Respiratory Effort  o : breathing unlabored  · Cardiovascular  o Peripheral Vascular System  o :   § Extremities  § : no edema  · Musculoskeletal  o General  o :   § General Musculoskeletal  § : No joint swelling   · Skin and Subcutaneous Tissue  o General Inspection  o : well healing linear wound noted of right upper back region; likely 1 cm in length; no purulent drainage appreciated; packing removed  · Neurologic  o Gait and Station  o :   § Gait Screening  § : normal gait  · Psychiatric  o Mood and Affect  o : mood normal, affect appropriate              Assessment  · Healing wound     959.9/T14.90XD    A.) Packing removed; will not replace, will allow the wound to heal via secondary intenstion; wound care instructions given to patient; wound dressed here in clinic with triple abx and Tegaderm; patient will change wound dressing q 48 hours; he will call provider in 1 week to discuss progression of healing; if any signs or symptoms of an infection, contact provider.         Plan  · Orders  o ACO-39: Current medications updated and reviewed () - - 12/20/2019  · Medications  o Medications have been Reconciled  o Transition of Care or Provider Policy  · Instructions  o Patient was educated/instructed on their diagnosis, treatment and medications prior to discharge from the clinic today.            Electronically Signed by: Beto Dawson DO -Author on December 20, 2019 12:04:17 PM  "

## 2021-05-07 NOTE — PROGRESS NOTES
Progress Note      Patient Name: Robby Hernandes   Patient ID: [id number]   Sex: Male   YOB: 1970        Visit Date: January 9, 2020    Provider: MADISON Lee   Location: [facility]   Location Address: [address] [place] [address] [address] [place]  [address]   Location Phone: [phone]          Chief Complaint     has had a wound on back, it is back and more infected looking that the original visit.       History Of Present Illness  Robby Hernandes is a 49 year old /White male who presents for evaluation and treatment of:      here to follow up on cyst to the back - his brother told him that it is looking more red and draining and having difficulty keeping bandage on   Denies fever, chills       Past Medical History  Disease Name Date Onset Notes   Allergic rhinitis --  --    Anxiety --  --    Arthritis --  --    Asthma --  --    Benign enlargement of prostate --  --    Chronic back pain --  --    COPD (chronic obstructive pulmonary disease) --  --    Depression --  --    Diabetes mellitus, type 2 07/08/2019 --    Essential hypertension 07/08/2019 --    GERD (gastroesophageal reflux disease) --  --    Hyperlipidemia 07/08/2019 --    Insomnia --  --    Low Testosterone --  --    Peripheral neuropathy --  --    Sleep apnea --  --    Vitamin D Deficiency --  --          Past Surgical History  Procedure Name Date Notes   Total hip replacement --  --          Medication List  Name Date Started Instructions   fenofibrate nanocrystallized 145 mg oral tablet 12/03/2019 take 1 tablet by mouth at bedtime   Fish Oil 1,000 mg (120 mg-180 mg) oral capsule  --    FreeStyle Test miscellaneous strip  use as directed   hydrochlorothiazide 12.5 mg oral tablet 12/17/2019 TAKE 1 TABLET DAILY.   Januvia 100 mg oral tablet 12/03/2019 take 1 tablet by mouth once daily   losartan 100 mg oral tablet 07/08/2019 take 1 tablet (100 mg) by oral route once daily for 30 days   metformin 1,000 mg oral  tablet 12/03/2019 take 1 tablet (1,000 mg) by oral route 2 times per day with morning and evening meals for 30 days   montelukast 10 mg oral tablet 12/03/2019 take 1 tablet by mouth once daily   pravastatin 20 mg oral tablet 07/08/2019 take 1 tablet (20 mg) by oral route once daily for 30 days   pravastatin 40 mg oral tablet 07/08/2019 take 1 tablet by oral route daily for 30 days   venlafaxine 75 mg oral capsule,extended release 24hr 12/03/2019 TAKE ONE CAPSULE BY MOUTH ONCE A DAY   Vitamin D3 2,000 unit oral capsule 07/08/2019 take 1 tablet by mouth daily         Allergy List  Allergen Name Date Reaction Notes   Red Meats --  --  --        Allergies Reconciled  Social History  Finding Status Start/Stop Quantity Notes   Alcohol Current some day --/-- <7 weekly --    Tobacco Former --/40 --  --          Immunizations  NameDate Admin Mfg Trade Name Lot Number Route Inj VIS Given VIS Publication   Mwlvnnljl86/03/2018 UNK Unknown TradeName [id number] NE NE 10/18/2018 08/07/2015   Comments:    Gsvyjtvmb42/05/2017 UNK Unknown TradeName [id number] NE NE 04/02/2018 08/07/2015   Comments:    Tdap01/07/2019 NE Not Entered  NE NE 01/08/2019    Comments: [facility]         Review of Systems  · Constitutional  o Denies  o : fever, chills  · Cardiovascular  o Denies  o : chest pain, palpitations  · Gastrointestinal  o Denies  o : nausea, vomiting  · Integument  o * See HPI      Vitals  Date Time BP Position Site L\R Cuff Size HR RR TEMP (F) WT  HT  BMI kg/m2 BSA m2 O2 Sat        01/09/2020 02:44 /80 Sitting    90 - R  97.4 254lbs 7oz    97 %          Physical Examination  · Constitutional  o Appearance  o : well developed, well-nourished, in no acute distress  · Eyes  o Conjunctivae  o : conjunctivae normal  o Pupils and Irises  o : pupils equal and round, pupils reactive to light bilaterally  · Neck  o Inspection/Palpation  o : supple  o Thyroid  o : no thyromegaly  · Respiratory  o Respiratory Effort  o : breathing  unlabored  o Auscultation of Lungs  o : clear to ascultation  · Cardiovascular  o Heart  o :   § Auscultation of Heart  § : regular rate and rhythm  o Peripheral Vascular System  o :   § Extremities  § : no edema  · Lymphatic  o Neck  o : no lymphadenopathy present  · Musculoskeletal  o General  o :   § General Musculoskeletal  § : No joint swelling or deformity. Muscle tone, strength, and development grossly normal.  · Skin and Subcutaneous Tissue  o General Inspection  o : thoracic back with a 4cm honey colored crusting lesion with a central oblong opening   · Neurologic  o Gait and Station  o :   § Gait Screening  § : normal gait  · Psychiatric  o Mood and Affect  o : mood normal, affect appropriate              Assessment  · Impetigo     684/L01.00  of the thoracic back from previous cyst/abscess      Plan  · Orders  o ACO-39: Current medications updated and reviewed () - - 01/09/2020  · Medications  o mupirocin 2 % topical ointment   SIG: apply a small amount to the affected area by topical route 3 times per day for 7 days   DISP: (1) 1 gm pf appli with 0 refills  Prescribed on 01/09/2020     o Medications have been Reconciled  o Transition of Care or Provider Policy  · Instructions  o Take all medications as prescribed/directed.  o Patient was educated/instructed on their diagnosis, treatment and medications prior to discharge from the clinic today.  o Use dial soap to clean the area and use Bactroban ointment and keep covered.  · Disposition  o Follow up as needed.            Electronically Signed by: MADISON Lee -Author on January 9, 2020 03:01:37 PM

## 2021-05-07 NOTE — PROGRESS NOTES
Progress Note      Patient Name: Robby Hernandes   Patient ID: [id number]   Sex: Male   YOB: 1970        Visit Date: March 9, 2020    Provider: Wei Lauren MD   Location: [facility]   Location Address: [address] [place] [address] [address] [place]  [address]   Location Phone: [phone]          Chief Complaint     MED REFILLS       History Of Present Illness  Robby Hernandes is a 50 year old /White male who presents for evaluation and treatment of:      pt says that he has been out of meds since January  discussed FDA black box for singulair  DM II- unknown control- need labs to assess  HTN- uncontrolled- will restart meds  hyperlipidemia- unknown control- need labs to assess  pt tolerating meds well       Past Medical History  Disease Name Date Onset Notes   Allergic rhinitis --  --    Anxiety --  --    Arthritis --  --    Asthma --  --    Benign enlargement of prostate --  --    Chronic back pain --  --    COPD (chronic obstructive pulmonary disease) --  --    Depression --  --    Diabetes mellitus, type 2 07/08/2019 --    Essential hypertension 07/08/2019 --    GERD (gastroesophageal reflux disease) --  --    Hyperlipidemia 07/08/2019 --    Insomnia --  --    Low Testosterone --  --    Peripheral neuropathy --  --    Sleep apnea --  --    Vitamin D Deficiency --  --          Past Surgical History  Procedure Name Date Notes   Total hip replacement --  --          Medication List  Name Date Started Instructions   fenofibrate nanocrystallized 145 mg oral tablet 12/03/2019 take 1 tablet by mouth at bedtime   Fish Oil 1,000 mg (120 mg-180 mg) oral capsule  --    FreeStyle Test miscellaneous strip  use as directed   hydrochlorothiazide 12.5 mg oral tablet 12/17/2019 TAKE 1 TABLET DAILY.   Januvia 100 mg oral tablet 12/03/2019 take 1 tablet by mouth once daily   losartan 100 mg oral tablet 07/08/2019 take 1 tablet (100 mg) by oral route once daily for 30 days   metformin 500 mg  "oral tablet 02/22/2020 TAKE 2 TABLETS BY MOUTH TWICE A DAY   montelukast 10 mg oral tablet 12/03/2019 take 1 tablet by mouth once daily   pravastatin 20 mg oral tablet 07/08/2019 take 1 tablet (20 mg) by oral route once daily for 30 days   pravastatin 40 mg oral tablet 07/08/2019 take 1 tablet by oral route daily for 30 days   venlafaxine 75 mg oral capsule,extended release 24hr 12/03/2019 TAKE ONE CAPSULE BY MOUTH ONCE A DAY   Vitamin D3 2,000 unit oral capsule 07/08/2019 take 1 tablet by mouth daily         Allergy List  Allergen Name Date Reaction Notes   Red Meats --  --  --          Social History  Finding Status Start/Stop Quantity Notes   Alcohol Current some day --/-- <7 weekly --    Tobacco Former --/40 --  --          Immunizations  NameDate Admin Mfg Trade Name Lot Number Route Inj VIS Given VIS Publication   Wavnevsff22/05/2019 UNK Unknown TradeName [id number] NE NE 03/09/2020    Comments: [facility]   Vjhthzvaw11/03/2018 UNK Unknown TradeName [id number] NE NE 10/18/2018 08/07/2015   Comments:    Gluosinpx41/05/2017 UNK Unknown TradeName [id number] NE NE 04/02/2018 08/07/2015   Comments:    Yzxknvuek6697/05/2019 [facility] [place]UM[place] 23 [id number] NE NE 03/09/2020    Comments: [facility]   Tdap01/07/2019 NE Not Entered  NE NE 01/08/2019    Comments: [facility]         Review of Systems  · Constitutional  o Denies  o : fatigue, fever  · Cardiovascular  o Denies  o : chest pain, palpitations  · Respiratory  o Denies  o : shortness of breath, cough  · Gastrointestinal  o Denies  o : nausea, vomiting, diarrhea  · Psychiatric  o Denies  o : anxiety, depression      Vitals  Date Time BP Position Site L\R Cuff Size HR RR TEMP (F) WT  HT  BMI kg/m2 BSA m2 O2 Sat        03/09/2020 10:27 /100 Sitting    110 - R  96.8 257lbs 0oz 5'  10.5\" 36.35 2.41 97 %          Physical Examination  · Constitutional  o Appearance  o : well developed, well-nourished, in no acute distress  · Eyes  o Conjunctivae  o : conjunctivae " normal  · Respiratory  o Respiratory Effort  o : breathing unlabored  o Auscultation of Lungs  o : clear to ascultation  · Cardiovascular  o Heart  o :   § Auscultation of Heart  § : regular rate and rhythm  o Peripheral Vascular System  o :   § Extremities  § : no edema  · Gastrointestinal  o Abdomen  o : soft, non-tender, non-distended, + bowel sounds, no hepatosplenomegaly, no masses palpated  · Musculoskeletal  o General  o :   § General Musculoskeletal  § : No joint swelling or deformity., Muscle tone, strength, and development grossly normal.  · Neurologic  o Gait and Station  o :   § Gait Screening  § : normal gait  · Psychiatric  o Mood and Affect  o : mood normal, affect appropriate  · Left DM Foot Exam  o Sensation  o : normal sensory exam perceptible to 10-gram nylon monofilament exam (5/5), vibration and light touch.  o Visual Inspection  o : visual inspection is normal with no signs of breakdown, ulcerations or deformities unless otherwise noted.   o Vascular  o : palpable dorsalis pedis and posterir tibialis pulses present, normal capillary refill  · Right DM Foot Exam  o Sensation  o : normal sensory exam perceptible to 10-gram nylon monofilament exam (5/5), vibration and light touch.  o Visual Inspection  o : normal except has tenderness in plantar fascia, has callous on medial big toe  o Vascular  o : palpable dorsalis pedis and posterir tibialis pulses present, normal capillary refill          Assessment  · Diabetes mellitus, type 2     250.00/E11.9  · Essential hypertension     401.9/I10  · Vitamin D deficiency     268.9/E55.9  · Screen for colon cancer     V76.51/Z12.11      Plan  · Orders  o CBC with Auto Diff [facility] (68026) - 250.00/E11.9 - 03/09/2020  o CMP [facility] (21780) - 250.00/E11.9 - 03/09/2020  o Hgb A1c [facility] (58770) - 250.00/E11.9 - 03/09/2020  o Lipid Panel [facility] (58907) - 250.00/E11.9 - 03/09/2020  o Urine microalbumin (04074) - 250.00/E11.9 - 03/09/2020  o Thyroid Profile (THYII) -  250.00/E11.9 - 03/09/2020  o PODIATRY CONSULTATION (PODIA) - 250.00/E11.9 - 03/09/2020  o Diabetic Foot (Motor and Sensory) Exam Completed [facility] (, , 2028F) - 250.00/E11.9 - 03/09/2020  o ACO-14: Influenza immunization administered or previously received () - - 03/09/2020  o ACO-15: Pneumococcal Vaccine Administered or Previously Received (4040F) - - 03/09/2020  o ACO-39: Current medications updated and reviewed () - - 03/09/2020  o Gastroenterology Consultation (GASTR) - V76.51/Z12.11 - 03/09/2020   need screening colonoscopy- no change in stool  o Cologuard (, 07917) - V76.51/Z12.11 - 03/09/2020  · Medications  o fenofibrate nanocrystallized 145 mg oral tablet   SIG: take 1 tablet by mouth at bedtime   DISP: (90) Tablet with 1 refills  Adjusted on 03/09/2020     o hydrochlorothiazide 12.5 mg oral tablet   SIG: TAKE 1 TABLET DAILY.   DISP: (90) Tablet with 1 refills  Adjusted on 03/09/2020     o Januvia 100 mg oral tablet   SIG: take 1 tablet by mouth once daily   DISP: (90) Tablet with 1 refills  Adjusted on 03/09/2020     o losartan 100 mg oral tablet   SIG: take 1 tablet (100 mg) by oral route once daily for 90 days   DISP: (90) tablets with 1 refills  Adjusted on 03/09/2020     o metformin 500 mg oral tablet   SIG: TAKE 2 TABLETS BY MOUTH TWICE A DAY   DISP: (360) Tablet with 1 refills  Adjusted on 03/09/2020     o pravastatin 20 mg oral tablet   SIG: take 1 tablet (20 mg) by oral route once daily for 90 days   DISP: (90) tablets with 1 refills  Adjusted on 03/09/2020     o pravastatin 40 mg oral tablet   SIG: take 1 tablet by oral route daily for 90 days   DISP: (90) Tablet with 1 refills  Adjusted on 03/09/2020     o venlafaxine 75 mg oral capsule,extended release 24hr   SIG: TAKE ONE CAPSULE BY MOUTH ONCE A DAY   DISP: (90) Capsule with 1 refills  Adjusted on 03/09/2020     o Vitamin D3 50 mcg (2,000 unit) oral capsule   SIG: take 1 tablet by mouth daily   DISP: (90) Capsule with 1  refills  Adjusted on 03/09/2020     o montelukast 10 mg oral tablet   SIG: take 1 tablet by mouth once daily   DISP: (30) Tablet with 0 refills  Discontinued on 03/09/2020     o Medications have been Reconciled  o Transition of Care or Provider Policy  · Instructions  o Patient was educated/instructed on their diagnosis, treatment and medications prior to discharge from the clinic today.            Electronically Signed by: Wei Lauren MD -Author on March 9, 2020 11:07:25 AM

## 2021-05-07 NOTE — PROGRESS NOTES
Progress Note      Patient Name: Robby Hernandes   Patient ID: [id number]   Sex: Male   YOB: 1970        Visit Date: December 9, 2019    Provider: Beto Dawson DO   Location: [facility], [place]   Location Address: [address] [place] [address] [place]  [address]   Location Phone: [phone]          Chief Complaint     Lesion on back.       History Of Present Illness  Robby Hernandes is a 49 year old /White male who presents for evaluation and treatment of:      1.) Lesion on back : The patient presents with a lesion of his right upper back region. Unclear regarding onset of the lesion. He now presents with erythema of the region, as well as tenderness with palpation. He admits to noticing a 'yellowish drainage' of the area a couple of days ago. He denies any fevers or chills.       Past Medical History  Disease Name Date Onset Notes   Allergic rhinitis --  --    Anxiety --  --    Arthritis --  --    Asthma --  --    Benign enlargement of prostate --  --    Chronic back pain --  --    COPD (chronic obstructive pulmonary disease) --  --    Depression --  --    Diabetes --  --    Diabetes mellitus, type 2 07/08/2019 --    Essential hypertension 07/08/2019 --    GERD (gastroesophageal reflux disease) --  --    Hypercholesterolemia --  --    Hyperlipidemia --  --    Hyperlipidemia 07/08/2019 --    Insomnia --  --    Low Testosterone --  --    Peripheral neuropathy --  --    Sleep apnea --  --    Vitamin D Deficiency --  --          Past Surgical History  Procedure Name Date Notes   Total hip replacement --  --          Medication List  Name Date Started Instructions   aspirin 81 mg oral tablet,chewable  chew 1 tablet (81 mg) by oral route once daily   fenofibrate nanocrystallized 145 mg oral tablet 12/03/2019 take 1 tablet by mouth at bedtime   Fish Oil 1,000 mg (120 mg-180 mg) oral capsule  --    FreeStyle Test miscellaneous strip  use as directed   hydrochlorothiazide 12.5 mg oral  tablet 07/08/2019 TAKE 1 TABLET DAILY.   Januvia 100 mg oral tablet 12/03/2019 take 1 tablet by mouth once daily   losartan 100 mg oral tablet 07/08/2019 take 1 tablet (100 mg) by oral route once daily for 30 days   metformin 1,000 mg oral tablet 12/03/2019 take 1 tablet (1,000 mg) by oral route 2 times per day with morning and evening meals for 30 days   montelukast 10 mg oral tablet 12/03/2019 take 1 tablet by mouth once daily   pravastatin 20 mg oral tablet 07/08/2019 take 1 tablet (20 mg) by oral route once daily for 30 days   pravastatin 40 mg oral tablet 07/08/2019 take 1 tablet by oral route daily for 30 days   venlafaxine 75 mg oral capsule,extended release 24hr 12/03/2019 TAKE ONE CAPSULE BY MOUTH ONCE A DAY   Vitamin D3 2,000 unit oral capsule 07/08/2019 take 1 tablet by mouth daily         Allergy List  Allergen Name Date Reaction Notes   Red Meats --  --  --          Social History  Finding Status Start/Stop Quantity Notes   Alcohol Current some day --/-- <7 weekly --    Tobacco Former --/40 --  --          Immunizations  NameDate Admin Mfg Trade Name Lot Number Route Inj VIS Given VIS Publication   Mrzusvgyt15/03/2018 UNK Unknown TradeName [id number] NE NE 10/18/2018 08/07/2015   Comments:    Vbvqhfpyp30/05/2017 UNK Unknown TradeName [id number] NE NE 04/02/2018 08/07/2015   Comments:    Tdap01/07/2019 NE Not Entered  NE NE 01/08/2019    Comments: [facility]         Review of Systems  · Constitutional  o Denies  o : fever, chills  · Eyes  o Denies  o : discharge from eye, eye discomfort  · HENT  o Denies  o : lightheadedness, nasal congestion  · Cardiovascular  o Denies  o : chest pain, syncope  · Respiratory  o Denies  o : shortness of breath, wheezing  · Gastrointestinal  o Denies  o : nausea, vomiting  · Integument  o Admits  o : new skin lesions  · Neurologic  o Denies  o : altered mental status, tingling or numbness  · Musculoskeletal  o Denies  o : joint pain, joint swelling  · Psychiatric  o Denies  o :  "delusions, feeling confused  · Heme-Lymph  o Denies  o : petechiae, purpura      Vitals  Date Time BP Position Site L\R Cuff Size HR RR TEMP (F) WT  HT  BMI kg/m2 BSA m2 O2 Sat HC       12/09/2019 02:25 /90 Sitting    110 - R  96.3 242lbs 4oz 5'  10.5\" 34.27 2.34 98 %          Physical Examination  · Constitutional  o Appearance  o : well developed, well-nourished, in no acute distress  · Head and Face  o HEENT  o : Unremarkable  · Eyes  o Conjunctivae  o : conjunctivae normal  o Pupils and Irises  o : pupils equal and round  · Neck  o Inspection/Palpation  o : supple  · Respiratory  o Respiratory Effort  o : breathing unlabored  · Cardiovascular  o Peripheral Vascular System  o :   § Extremities  § : no edema  · Lymphatic  o Neck  o : no lymphadenopathy present  · Musculoskeletal  o General  o :   § General Musculoskeletal  § : No joint swelling or deformity.  · Skin and Subcutaneous Tissue  o General Inspection  o : area of erythema and induration noted of right upper aspect of back; approximately area covering for centimeters in greatest diameter; fluctuant central region appreciated  · Neurologic  o Gait and Station  o :   § Gait Screening  § : normal gait  · Psychiatric  o Mood and Affect  o : mood normal, affect appropriate     PRE-OP DIAGNOSIS: Infected sebaceous cyst  POST-OP DIAGNOSIS: Same   PROCEDURE: incision and drainage of infected sebaceous cyst  Performing Physician: Beto Dawson DO     PROCEDURE:   A timeout protocol was performed prior to initiating the procedure.  The area was prepared and draped in the usual, sterile manner. The site was anesthetized with 2% lidocaine with epinephrine. A linear incision along the local skin lines was made and the purulent/caseous material was expressed. The pocket was explored thoroughly and sequestered pockets were opened. Bleeding was minimal. Pocket packed. The patient tolerated the procedure well without complications. Standard post-procedure care is " explained and return precautions are given.               Assessment  · Sebaceous cyst     706.2/L72.3    A.) Likely infected sebaceous cyst, incised and drained here in clinic (see procedure note); anticipatory guidance provided to patient; will place on antibiotic treatment as noted below; wound was packed here in clinic; the patient will return in 2 days for a re-assessment and re-packing of his wound.       Problems Reconciled  Plan  · Orders  o ACO-39: Current medications updated and reviewed () - - 12/09/2019  · Medications  o doxycycline monohydrate 100 mg oral capsule   SIG: Take 1 tablet by mouth twice a day until finished   DISP: (28) capsules with 0 refills  Prescribed on 12/09/2019     o Medications have been Reconciled  o Transition of Care or Provider Policy  · Instructions  o Take all medications as prescribed/directed.  o Patient was educated/instructed on their diagnosis, treatment and medications prior to discharge from the clinic today.  o Return in 2 days.             Electronically Signed by: Beto Dawson DO - on December 9, 2019 03:12:50 PM

## 2021-05-09 VITALS
DIASTOLIC BLOOD PRESSURE: 80 MMHG | BODY MASS INDEX: 38.53 KG/M2 | WEIGHT: 260.12 LBS | SYSTOLIC BLOOD PRESSURE: 136 MMHG | TEMPERATURE: 96.8 F | OXYGEN SATURATION: 97 % | HEART RATE: 86 BPM | HEIGHT: 69 IN

## 2021-05-09 VITALS
OXYGEN SATURATION: 95 % | BODY MASS INDEX: 36.91 KG/M2 | SYSTOLIC BLOOD PRESSURE: 140 MMHG | HEART RATE: 113 BPM | WEIGHT: 257.25 LBS | TEMPERATURE: 97 F | DIASTOLIC BLOOD PRESSURE: 78 MMHG

## 2021-05-09 VITALS
OXYGEN SATURATION: 98 % | TEMPERATURE: 97 F | SYSTOLIC BLOOD PRESSURE: 130 MMHG | DIASTOLIC BLOOD PRESSURE: 84 MMHG | BODY MASS INDEX: 35.66 KG/M2 | WEIGHT: 248.5 LBS | HEART RATE: 114 BPM

## 2021-05-09 VITALS
BODY MASS INDEX: 36.51 KG/M2 | TEMPERATURE: 97.4 F | SYSTOLIC BLOOD PRESSURE: 128 MMHG | WEIGHT: 254.44 LBS | HEART RATE: 90 BPM | DIASTOLIC BLOOD PRESSURE: 80 MMHG | OXYGEN SATURATION: 97 %

## 2021-05-09 VITALS
BODY MASS INDEX: 36.15 KG/M2 | OXYGEN SATURATION: 98 % | TEMPERATURE: 96.2 F | SYSTOLIC BLOOD PRESSURE: 128 MMHG | HEART RATE: 78 BPM | WEIGHT: 252.5 LBS | HEIGHT: 70 IN | DIASTOLIC BLOOD PRESSURE: 76 MMHG

## 2021-05-09 VITALS
OXYGEN SATURATION: 98 % | TEMPERATURE: 96.2 F | WEIGHT: 260.5 LBS | BODY MASS INDEX: 37.38 KG/M2 | DIASTOLIC BLOOD PRESSURE: 82 MMHG | HEART RATE: 96 BPM | SYSTOLIC BLOOD PRESSURE: 142 MMHG

## 2021-05-09 VITALS
HEART RATE: 110 BPM | TEMPERATURE: 96.8 F | OXYGEN SATURATION: 97 % | SYSTOLIC BLOOD PRESSURE: 182 MMHG | DIASTOLIC BLOOD PRESSURE: 100 MMHG | BODY MASS INDEX: 36.79 KG/M2 | HEIGHT: 70 IN | WEIGHT: 257 LBS

## 2021-05-09 VITALS
HEIGHT: 69 IN | DIASTOLIC BLOOD PRESSURE: 82 MMHG | SYSTOLIC BLOOD PRESSURE: 132 MMHG | TEMPERATURE: 97.2 F | HEART RATE: 105 BPM | OXYGEN SATURATION: 97 % | BODY MASS INDEX: 37.63 KG/M2 | WEIGHT: 254.06 LBS

## 2021-05-09 VITALS
WEIGHT: 255.37 LBS | SYSTOLIC BLOOD PRESSURE: 138 MMHG | BODY MASS INDEX: 36.64 KG/M2 | HEART RATE: 108 BPM | OXYGEN SATURATION: 96 % | TEMPERATURE: 97.4 F | DIASTOLIC BLOOD PRESSURE: 84 MMHG

## 2021-05-09 VITALS
HEART RATE: 101 BPM | SYSTOLIC BLOOD PRESSURE: 150 MMHG | TEMPERATURE: 97.2 F | BODY MASS INDEX: 37.22 KG/M2 | WEIGHT: 260 LBS | DIASTOLIC BLOOD PRESSURE: 90 MMHG | OXYGEN SATURATION: 98 % | HEIGHT: 70 IN

## 2021-05-09 VITALS
WEIGHT: 242 LBS | OXYGEN SATURATION: 98 % | TEMPERATURE: 97 F | WEIGHT: 242 LBS | HEART RATE: 87 BPM | TEMPERATURE: 96.8 F | HEART RATE: 101 BPM | BODY MASS INDEX: 34.72 KG/M2 | SYSTOLIC BLOOD PRESSURE: 120 MMHG | DIASTOLIC BLOOD PRESSURE: 68 MMHG | BODY MASS INDEX: 34.72 KG/M2 | SYSTOLIC BLOOD PRESSURE: 110 MMHG | OXYGEN SATURATION: 95 % | DIASTOLIC BLOOD PRESSURE: 82 MMHG

## 2021-05-09 VITALS
DIASTOLIC BLOOD PRESSURE: 78 MMHG | OXYGEN SATURATION: 99 % | HEART RATE: 79 BPM | WEIGHT: 258 LBS | SYSTOLIC BLOOD PRESSURE: 150 MMHG | TEMPERATURE: 98 F | BODY MASS INDEX: 37.02 KG/M2

## 2021-05-09 VITALS
HEART RATE: 95 BPM | OXYGEN SATURATION: 98 % | BODY MASS INDEX: 35.6 KG/M2 | WEIGHT: 248.12 LBS | DIASTOLIC BLOOD PRESSURE: 84 MMHG | TEMPERATURE: 96.4 F | SYSTOLIC BLOOD PRESSURE: 134 MMHG

## 2021-05-09 VITALS
HEIGHT: 70 IN | BODY MASS INDEX: 34.68 KG/M2 | WEIGHT: 242.25 LBS | DIASTOLIC BLOOD PRESSURE: 90 MMHG | OXYGEN SATURATION: 98 % | HEART RATE: 110 BPM | SYSTOLIC BLOOD PRESSURE: 150 MMHG | TEMPERATURE: 96.3 F

## 2021-06-07 RX ORDER — ATORVASTATIN CALCIUM 40 MG/1
TABLET, FILM COATED ORAL
Qty: 90 TABLET | Refills: 0 | Status: SHIPPED | OUTPATIENT
Start: 2021-06-07 | End: 2021-12-07 | Stop reason: SDUPTHER

## 2021-06-07 RX ORDER — FENOFIBRATE 145 MG/1
TABLET, COATED ORAL
Qty: 90 TABLET | Refills: 0 | Status: SHIPPED | OUTPATIENT
Start: 2021-06-07 | End: 2021-12-07 | Stop reason: SDUPTHER

## 2021-06-07 RX ORDER — VENLAFAXINE HYDROCHLORIDE 75 MG/1
CAPSULE, EXTENDED RELEASE ORAL
Qty: 90 CAPSULE | Refills: 0 | Status: SHIPPED | OUTPATIENT
Start: 2021-06-07 | End: 2021-12-07 | Stop reason: SDUPTHER

## 2021-06-07 RX ORDER — SITAGLIPTIN 100 MG/1
TABLET, FILM COATED ORAL
Qty: 90 TABLET | Refills: 0 | Status: SHIPPED | OUTPATIENT
Start: 2021-06-07 | End: 2021-12-07 | Stop reason: SDUPTHER

## 2021-06-07 RX ORDER — HYDROCHLOROTHIAZIDE 25 MG/1
TABLET ORAL
Qty: 90 TABLET | Refills: 0 | Status: SHIPPED | OUTPATIENT
Start: 2021-06-07 | End: 2021-12-07 | Stop reason: SDUPTHER

## 2021-08-18 PROCEDURE — U0003 INFECTIOUS AGENT DETECTION BY NUCLEIC ACID (DNA OR RNA); SEVERE ACUTE RESPIRATORY SYNDROME CORONAVIRUS 2 (SARS-COV-2) (CORONAVIRUS DISEASE [COVID-19]), AMPLIFIED PROBE TECHNIQUE, MAKING USE OF HIGH THROUGHPUT TECHNOLOGIES AS DESCRIBED BY CMS-2020-01-R: HCPCS | Performed by: FAMILY MEDICINE

## 2021-08-19 RX ORDER — PRAVASTATIN SODIUM 40 MG
TABLET ORAL
Qty: 90 TABLET | Refills: 0 | OUTPATIENT
Start: 2021-08-19

## 2021-10-01 ENCOUNTER — OFFICE VISIT (OUTPATIENT)
Dept: FAMILY MEDICINE CLINIC | Facility: CLINIC | Age: 51
End: 2021-10-01

## 2021-10-01 VITALS
HEIGHT: 70 IN | TEMPERATURE: 97.3 F | DIASTOLIC BLOOD PRESSURE: 78 MMHG | SYSTOLIC BLOOD PRESSURE: 122 MMHG | OXYGEN SATURATION: 98 % | BODY MASS INDEX: 37.08 KG/M2 | WEIGHT: 259 LBS | HEART RATE: 100 BPM

## 2021-10-01 DIAGNOSIS — R22.30 MASS OF SHOULDER REGION: Primary | ICD-10-CM

## 2021-10-01 DIAGNOSIS — R22.2 MASS OF SKIN OF BACK: ICD-10-CM

## 2021-10-01 DIAGNOSIS — L03.312 CELLULITIS OF BACK EXCEPT BUTTOCK: ICD-10-CM

## 2021-10-01 PROCEDURE — 99213 OFFICE O/P EST LOW 20 MIN: CPT | Performed by: FAMILY MEDICINE

## 2021-10-01 RX ORDER — AMOXICILLIN AND CLAVULANATE POTASSIUM 875; 125 MG/1; MG/1
1 TABLET, FILM COATED ORAL 2 TIMES DAILY
Qty: 20 TABLET | Refills: 0 | Status: SHIPPED | OUTPATIENT
Start: 2021-10-01 | End: 2021-10-11

## 2021-10-01 NOTE — PROGRESS NOTES
"Chief Complaint    Skin Lesion (SHOULDER)    Subjective      Robby Hernandes presents to [facility]   History of Present Illness    1.) SKIN LESION X 2 : Location : right shoulder region and left upper mid-back region. Initial attempt at I & D of mid-back lesion (sebaceous cyst). Patient reports that the lesion 'never really did completely go away after we did the procedure.' He now reports noticing erythema and tenderness of a mass of his right shoulder region. No skin breakdown.     Objective      Vital Signs:     /78   Pulse 100   Temp 97.3 °F (36.3 °C)   Ht 177.8 cm (70\")   Wt 117 kg (259 lb)   SpO2 98%   BMI 37.16 kg/m²       Physical Exam  Vitals reviewed.   Constitutional:       General: He is not in acute distress.     Appearance: Normal appearance. He is well-developed.      Comments: morbid obesity    HENT:      Head: Normocephalic and atraumatic.      Right Ear: Hearing and external ear normal.      Left Ear: Hearing and external ear normal.      Nose: Nose normal.   Eyes:      General: Lids are normal.         Right eye: No discharge.         Left eye: No discharge.      Conjunctiva/sclera: Conjunctivae normal.   Pulmonary:      Effort: Pulmonary effort is normal.   Abdominal:      General: There is no distension.   Musculoskeletal:         General: No swelling.      Cervical back: Neck supple.   Skin:     Coloration: Skin is not jaundiced.      Findings: No erythema.      Comments: Mass appreciated of right upper posterior trunk region - approximately large grape size - erythema of skin appreciated - patient admits to tenderness with palpation - no drainage appreciated. Mass of left superior mid-back region - approximately large grape-size as well, no tenderness with palpation - no drainage appreciated.   Neurological:      Mental Status: He is alert. Mental status is at baseline.   Psychiatric:         Mood and Affect: Mood and affect normal.         Thought " Content: Thought content normal.     Assessment and Plan     Diagnoses and all orders for this visit:    1. Mass of shoulder region (Primary)  Comments:  1.) Referred to dermatology for an evaluation and recommendations.   Orders:  -     Ambulatory Referral to Dermatology    2. Mass of skin of back  Comments:  1.) See above.   Orders:  -     Ambulatory Referral to Dermatology    3. Cellulitis of back except buttock  Comments:  1.) Start abx as noted.     Other orders  -     amoxicillin-clavulanate (Augmentin) 875-125 MG per tablet; Take 1 tablet by mouth 2 (Two) Times a Day for 10 days.  Dispense: 20 tablet; Refill: 0    Follow Up     Return if symptoms worsen or fail to improve.     Patient was given instructions and counseling regarding his condition or for health maintenance advice. Please see specific information pulled into the AVS if appropriate.

## 2021-11-13 ENCOUNTER — OFFICE VISIT (OUTPATIENT)
Dept: FAMILY MEDICINE CLINIC | Facility: CLINIC | Age: 51
End: 2021-11-13

## 2021-11-13 VITALS — TEMPERATURE: 97.1 F | HEART RATE: 95 BPM | OXYGEN SATURATION: 95 %

## 2021-11-13 DIAGNOSIS — J02.9 PHARYNGITIS, UNSPECIFIED ETIOLOGY: Primary | ICD-10-CM

## 2021-11-13 LAB — SARS-COV-2 N GENE RESP QL NAA+PROBE: NOT DETECTED

## 2021-11-13 PROCEDURE — 87635 SARS-COV-2 COVID-19 AMP PRB: CPT | Performed by: FAMILY MEDICINE

## 2021-11-13 PROCEDURE — 99213 OFFICE O/P EST LOW 20 MIN: CPT | Performed by: FAMILY MEDICINE

## 2021-11-13 RX ORDER — SACCHAROMYCES BOULARDII 250 MG
250 CAPSULE ORAL 2 TIMES DAILY
Qty: 20 CAPSULE | Refills: 0 | Status: SHIPPED | OUTPATIENT
Start: 2021-11-13 | End: 2021-11-23

## 2021-11-13 RX ORDER — CEFDINIR 300 MG/1
300 CAPSULE ORAL 2 TIMES DAILY
Qty: 14 CAPSULE | Refills: 0 | Status: SHIPPED | OUTPATIENT
Start: 2021-11-13 | End: 2021-11-20

## 2021-11-13 NOTE — PROGRESS NOTES
Chief Complaint  Sore Throat (Patient has had symptoms for 6 days. ) and Cough    Subjective          Robby Hernandes presents to [facility]  Pt is not vaccinated for covid.    URI   This is a new problem. The current episode started in the past 7 days. The problem has been gradually worsening. There has been no fever. Associated symptoms include congestion, coughing and a sore throat. Pertinent negatives include no abdominal pain, chest pain, diarrhea, dysuria, ear pain, headaches, joint pain, joint swelling, nausea, neck pain, plugged ear sensation, rash, rhinorrhea, sinus pain, sneezing, swollen glands, vomiting or wheezing. He has tried nothing for the symptoms.       Objective   Allergies   Allergen Reactions   • Oxycodone-Acetaminophen Itching   • Lyrica [Pregabalin] Hallucinations   • Tree Extract Other (See Comments)     Runny nose, sneezing, congestion     Immunization History   Administered Date(s) Administered   • Flu Vaccine Split Quad 09/25/2017, 10/05/2017, 09/03/2018, 12/05/2019   • Hepatitis A 04/11/2019   • Influenza Quad Vaccine (Inpatient) 08/10/2016   • Influenza, Unspecified 11/02/2015   • Pneumococcal Polysaccharide (PPSV23) 08/10/2016, 12/05/2019   • Tdap 07/28/2017, 01/07/2019       Vital Signs:   Vitals:    11/13/21 0926   Pulse: 95   Temp: 97.1 °F (36.2 °C)   TempSrc: Temporal   SpO2: 95%       Physical Exam  Vitals reviewed.   Constitutional:       Appearance: Normal appearance. He is well-developed.   HENT:      Head: Normocephalic and atraumatic.      Right Ear: Tympanic membrane, ear canal and external ear normal.      Left Ear: Tympanic membrane, ear canal and external ear normal.      Nose: Nose normal.      Mouth/Throat:      Mouth: Mucous membranes are moist.      Pharynx: Oropharynx is clear. Posterior oropharyngeal erythema present. No oropharyngeal exudate.   Eyes:      Conjunctiva/sclera: Conjunctivae normal.      Pupils: Pupils are equal, round,  and reactive to light.   Cardiovascular:      Rate and Rhythm: Normal rate and regular rhythm.      Pulses: Normal pulses.      Heart sounds: Normal heart sounds. No murmur heard.  No friction rub. No gallop.    Pulmonary:      Effort: Pulmonary effort is normal.      Breath sounds: Normal breath sounds. No wheezing or rhonchi.   Abdominal:      General: Abdomen is flat. Bowel sounds are normal. There is no distension.      Palpations: Abdomen is soft. There is no mass.      Tenderness: There is no abdominal tenderness. There is no guarding or rebound.      Hernia: No hernia is present.   Musculoskeletal:         General: Normal range of motion.      Cervical back: Normal range of motion and neck supple.   Skin:     General: Skin is warm and dry.      Capillary Refill: Capillary refill takes less than 2 seconds.   Neurological:      General: No focal deficit present.      Mental Status: He is alert and oriented to person, place, and time.      Cranial Nerves: No cranial nerve deficit.   Psychiatric:         Mood and Affect: Mood and affect normal.         Behavior: Behavior normal.         Thought Content: Thought content normal.         Judgment: Judgment normal.        Result Review :                 Assessment and Plan    Diagnoses and all orders for this visit:    1. Pharyngitis, unspecified etiology (Primary)  -     COVID-19,CEPHEID/KATERINE/BDMAX,COR/ZACK/PAD/KENDRA IN-HOUSE(OR EMERGENT/ADD-ON),NP SWAB IN TRANSPORT MEDIA 3-4 HR TAT, RT-PCR - Swab, Nasopharynx  -     cefdinir (OMNICEF) 300 MG capsule; Take 1 capsule by mouth 2 (Two) Times a Day for 7 days.  Dispense: 14 capsule; Refill: 0  -     saccharomyces boulardii (Florastor) 250 MG capsule; Take 1 capsule by mouth 2 (Two) Times a Day for 10 days.  Dispense: 20 capsule; Refill: 0            Follow Up   Return in about 1 week (around 11/20/2021), or if symptoms worsen or fail to improve.  Patient was given instructions and counseling regarding his condition or for  health maintenance advice. Please see specific information pulled into the AVS if appropriate.

## 2021-11-30 RX ORDER — SITAGLIPTIN 100 MG/1
TABLET, FILM COATED ORAL
Qty: 90 TABLET | Refills: 0 | OUTPATIENT
Start: 2021-11-30

## 2021-11-30 RX ORDER — HYDROCHLOROTHIAZIDE 25 MG/1
TABLET ORAL
Qty: 90 TABLET | Refills: 0 | OUTPATIENT
Start: 2021-11-30

## 2021-11-30 RX ORDER — FENOFIBRATE 145 MG/1
TABLET, COATED ORAL
Qty: 90 TABLET | Refills: 0 | OUTPATIENT
Start: 2021-11-30

## 2021-11-30 RX ORDER — LOSARTAN POTASSIUM 100 MG/1
TABLET ORAL
Qty: 90 TABLET | OUTPATIENT
Start: 2021-11-30

## 2021-12-07 ENCOUNTER — OFFICE VISIT (OUTPATIENT)
Dept: FAMILY MEDICINE CLINIC | Facility: CLINIC | Age: 51
End: 2021-12-07

## 2021-12-07 VITALS
HEART RATE: 107 BPM | TEMPERATURE: 97.3 F | SYSTOLIC BLOOD PRESSURE: 120 MMHG | WEIGHT: 258 LBS | BODY MASS INDEX: 36.94 KG/M2 | OXYGEN SATURATION: 97 % | HEIGHT: 70 IN | DIASTOLIC BLOOD PRESSURE: 78 MMHG

## 2021-12-07 DIAGNOSIS — F41.9 ANXIETY AND DEPRESSION: ICD-10-CM

## 2021-12-07 DIAGNOSIS — Z23 NEED FOR INFLUENZA VACCINATION: ICD-10-CM

## 2021-12-07 DIAGNOSIS — E11.65 TYPE 2 DIABETES MELLITUS WITH HYPERGLYCEMIA, WITHOUT LONG-TERM CURRENT USE OF INSULIN (HCC): ICD-10-CM

## 2021-12-07 DIAGNOSIS — I10 PRIMARY HYPERTENSION: ICD-10-CM

## 2021-12-07 DIAGNOSIS — Z12.5 SCREENING PSA (PROSTATE SPECIFIC ANTIGEN): ICD-10-CM

## 2021-12-07 DIAGNOSIS — F32.A ANXIETY AND DEPRESSION: ICD-10-CM

## 2021-12-07 DIAGNOSIS — E78.2 MIXED HYPERLIPIDEMIA: ICD-10-CM

## 2021-12-07 DIAGNOSIS — E55.9 VITAMIN D DEFICIENCY: Primary | ICD-10-CM

## 2021-12-07 LAB
ALBUMIN SERPL-MCNC: 4.5 G/DL (ref 3.5–5.2)
ALBUMIN/GLOB SERPL: 1.6 G/DL
ALP SERPL-CCNC: 51 U/L (ref 39–117)
ALT SERPL W P-5'-P-CCNC: 28 U/L (ref 1–41)
ANION GAP SERPL CALCULATED.3IONS-SCNC: 8.9 MMOL/L (ref 5–15)
AST SERPL-CCNC: 18 U/L (ref 1–40)
BASOPHILS # BLD AUTO: 0.06 10*3/MM3 (ref 0–0.2)
BASOPHILS NFR BLD AUTO: 0.7 % (ref 0–1.5)
BILIRUB SERPL-MCNC: 0.3 MG/DL (ref 0–1.2)
BUN SERPL-MCNC: 19 MG/DL (ref 6–20)
BUN/CREAT SERPL: 22.6 (ref 7–25)
CALCIUM SPEC-SCNC: 9.7 MG/DL (ref 8.6–10.5)
CHLORIDE SERPL-SCNC: 100 MMOL/L (ref 98–107)
CHOLEST SERPL-MCNC: 155 MG/DL (ref 0–200)
CO2 SERPL-SCNC: 29.1 MMOL/L (ref 22–29)
CREAT SERPL-MCNC: 0.84 MG/DL (ref 0.76–1.27)
DEPRECATED RDW RBC AUTO: 40.1 FL (ref 37–54)
EOSINOPHIL # BLD AUTO: 0.14 10*3/MM3 (ref 0–0.4)
EOSINOPHIL NFR BLD AUTO: 1.6 % (ref 0.3–6.2)
ERYTHROCYTE [DISTWIDTH] IN BLOOD BY AUTOMATED COUNT: 12.2 % (ref 12.3–15.4)
GFR SERPL CREATININE-BSD FRML MDRD: 96 ML/MIN/1.73
GLOBULIN UR ELPH-MCNC: 2.8 GM/DL
GLUCOSE SERPL-MCNC: 269 MG/DL (ref 65–99)
HCT VFR BLD AUTO: 46.5 % (ref 37.5–51)
HDLC SERPL-MCNC: 31 MG/DL (ref 40–60)
HGB BLD-MCNC: 15.5 G/DL (ref 13–17.7)
IMM GRANULOCYTES # BLD AUTO: 0.05 10*3/MM3 (ref 0–0.05)
IMM GRANULOCYTES NFR BLD AUTO: 0.6 % (ref 0–0.5)
LDLC SERPL CALC-MCNC: 75 MG/DL (ref 0–100)
LDLC/HDLC SERPL: 2.05 {RATIO}
LYMPHOCYTES # BLD AUTO: 2.03 10*3/MM3 (ref 0.7–3.1)
LYMPHOCYTES NFR BLD AUTO: 23.5 % (ref 19.6–45.3)
MCH RBC QN AUTO: 30 PG (ref 26.6–33)
MCHC RBC AUTO-ENTMCNC: 33.3 G/DL (ref 31.5–35.7)
MCV RBC AUTO: 90.1 FL (ref 79–97)
MONOCYTES # BLD AUTO: 0.61 10*3/MM3 (ref 0.1–0.9)
MONOCYTES NFR BLD AUTO: 7.1 % (ref 5–12)
NEUTROPHILS NFR BLD AUTO: 5.73 10*3/MM3 (ref 1.7–7)
NEUTROPHILS NFR BLD AUTO: 66.5 % (ref 42.7–76)
NRBC BLD AUTO-RTO: 0 /100 WBC (ref 0–0.2)
PLATELET # BLD AUTO: 250 10*3/MM3 (ref 140–450)
PMV BLD AUTO: 10.6 FL (ref 6–12)
POTASSIUM SERPL-SCNC: 4.3 MMOL/L (ref 3.5–5.2)
PROT SERPL-MCNC: 7.3 G/DL (ref 6–8.5)
RBC # BLD AUTO: 5.16 10*6/MM3 (ref 4.14–5.8)
SODIUM SERPL-SCNC: 138 MMOL/L (ref 136–145)
T4 FREE SERPL-MCNC: 1.35 NG/DL (ref 0.93–1.7)
TRIGL SERPL-MCNC: 303 MG/DL (ref 0–150)
TSH SERPL DL<=0.05 MIU/L-ACNC: 1.23 UIU/ML (ref 0.27–4.2)
VLDLC SERPL-MCNC: 49 MG/DL (ref 5–40)
WBC NRBC COR # BLD: 8.62 10*3/MM3 (ref 3.4–10.8)

## 2021-12-07 PROCEDURE — 80061 LIPID PANEL: CPT | Performed by: FAMILY MEDICINE

## 2021-12-07 PROCEDURE — 90686 IIV4 VACC NO PRSV 0.5 ML IM: CPT | Performed by: FAMILY MEDICINE

## 2021-12-07 PROCEDURE — 84443 ASSAY THYROID STIM HORMONE: CPT | Performed by: FAMILY MEDICINE

## 2021-12-07 PROCEDURE — 85025 COMPLETE CBC W/AUTO DIFF WBC: CPT | Performed by: FAMILY MEDICINE

## 2021-12-07 PROCEDURE — G0008 ADMIN INFLUENZA VIRUS VAC: HCPCS | Performed by: FAMILY MEDICINE

## 2021-12-07 PROCEDURE — 84439 ASSAY OF FREE THYROXINE: CPT | Performed by: FAMILY MEDICINE

## 2021-12-07 PROCEDURE — 99214 OFFICE O/P EST MOD 30 MIN: CPT | Performed by: FAMILY MEDICINE

## 2021-12-07 PROCEDURE — 82306 VITAMIN D 25 HYDROXY: CPT | Performed by: FAMILY MEDICINE

## 2021-12-07 PROCEDURE — 80053 COMPREHEN METABOLIC PANEL: CPT | Performed by: FAMILY MEDICINE

## 2021-12-07 PROCEDURE — 82043 UR ALBUMIN QUANTITATIVE: CPT | Performed by: FAMILY MEDICINE

## 2021-12-07 PROCEDURE — 83036 HEMOGLOBIN GLYCOSYLATED A1C: CPT | Performed by: FAMILY MEDICINE

## 2021-12-07 RX ORDER — VENLAFAXINE HYDROCHLORIDE 75 MG/1
75 CAPSULE, EXTENDED RELEASE ORAL DAILY
Qty: 90 CAPSULE | Refills: 1 | Status: SHIPPED | OUTPATIENT
Start: 2021-12-07 | End: 2022-06-09 | Stop reason: SDUPTHER

## 2021-12-07 RX ORDER — HYDROCHLOROTHIAZIDE 25 MG/1
25 TABLET ORAL DAILY
Qty: 90 TABLET | Refills: 1 | Status: SHIPPED | OUTPATIENT
Start: 2021-12-07 | End: 2022-06-28

## 2021-12-07 RX ORDER — ATORVASTATIN CALCIUM 40 MG/1
40 TABLET, FILM COATED ORAL DAILY
Qty: 90 TABLET | Refills: 1 | Status: SHIPPED | OUTPATIENT
Start: 2021-12-07 | End: 2022-06-28

## 2021-12-07 RX ORDER — FENOFIBRATE 145 MG/1
145 TABLET, COATED ORAL
Qty: 90 TABLET | Refills: 1 | Status: SHIPPED | OUTPATIENT
Start: 2021-12-07 | End: 2022-08-05

## 2021-12-07 NOTE — PROGRESS NOTES
Chief Complaint  Hyperlipidemia (refills and labs) and Depression    Subjective          Robby Hernandes presents to [facility]  Hyperlipidemia  This is a chronic problem. The current episode started more than 1 year ago. The problem is uncontrolled. Recent lipid tests were reviewed and are variable. He has no history of chronic renal disease, diabetes, hypothyroidism, liver disease, obesity or nephrotic syndrome. There are no known factors aggravating his hyperlipidemia. Pertinent negatives include no chest pain, focal sensory loss, focal weakness, leg pain, myalgias or shortness of breath. Current antihyperlipidemic treatment includes statins. The current treatment provides significant improvement of lipids. There are no compliance problems.  Risk factors for coronary artery disease include diabetes mellitus, dyslipidemia, family history, male sex and hypertension.   Depression  Visit Type: follow-up  Patient is not experiencing: anhedonia, chest pain, choking sensation, compulsions, confusion, decreased concentration, depressed mood, dizziness, dry mouth, excessive worry, fatigue, feelings of hopelessness, feelings of worthlessness, hyperventilation, impotence, insomnia, irritability, malaise, memory impairment, muscle tension, nausea, nervousness/anxiety, obsessions, palpitations, panic, restlessness, shortness of breath, thoughts of death, weight gain and weight loss.  Frequency of symptoms: rarely   Severity: moderate   Sleep quality: fair  Nighttime awakenings: several  Compliance with medications:  %  Treatment side effects: no side effects.  Diabetes  He presents for his follow-up diabetic visit. He has type 2 diabetes mellitus. His disease course has been stable. There are no hypoglycemic associated symptoms. Pertinent negatives for hypoglycemia include no confusion or nervousness/anxiousness. There are no diabetic associated symptoms. Pertinent negatives for diabetes  "include no blurred vision, no chest pain, no fatigue, no foot paresthesias, no foot ulcerations, no polydipsia, no polyphagia, no polyuria, no visual change, no weakness and no weight loss. There are no hypoglycemic complications. Symptoms are stable. Pertinent negatives for diabetic complications include no impotence. Current diabetic treatment includes oral agent (monotherapy). He is compliant with treatment all of the time. He is following a generally healthy diet. He rarely participates in exercise. An ACE inhibitor/angiotensin II receptor blocker is not being taken. He sees a podiatrist.Eye exam is not current.       Objective   Allergies   Allergen Reactions   • Oxycodone-Acetaminophen Itching   • Lyrica [Pregabalin] Hallucinations   • Tree Extract Other (See Comments)     Runny nose, sneezing, congestion     Immunization History   Administered Date(s) Administered   • Flu Vaccine Split Quad 09/25/2017, 10/05/2017, 09/03/2018, 12/05/2019   • FluLaval/Fluarix/Fluzone >6 12/07/2021   • Hepatitis A 04/11/2019   • Influenza Quad Vaccine (Inpatient) 08/10/2016   • Influenza, Unspecified 11/02/2015   • Pneumococcal Polysaccharide (PPSV23) 08/10/2016, 12/05/2019   • Tdap 07/28/2017, 01/07/2019       Vital Signs:   Vitals:    12/07/21 1255   BP: 120/78   BP Location: Left arm   Pulse: 107   Temp: 97.3 °F (36.3 °C)   SpO2: 97%   Weight: 117 kg (258 lb)   Height: 177.8 cm (70\")       Physical Exam  Vitals reviewed.   Constitutional:       Appearance: Normal appearance. He is well-developed.   HENT:      Head: Normocephalic and atraumatic.      Right Ear: External ear normal.      Left Ear: External ear normal.      Mouth/Throat:      Pharynx: No oropharyngeal exudate.   Eyes:      Conjunctiva/sclera: Conjunctivae normal.      Pupils: Pupils are equal, round, and reactive to light.   Cardiovascular:      Rate and Rhythm: Normal rate and regular rhythm.      Pulses: Normal pulses.           Dorsalis pedis pulses are 2+ on " the right side and 2+ on the left side.      Heart sounds: Normal heart sounds. No murmur heard.  No friction rub. No gallop.    Pulmonary:      Effort: Pulmonary effort is normal.      Breath sounds: Normal breath sounds. No wheezing or rhonchi.   Abdominal:      General: Abdomen is flat. Bowel sounds are normal. There is no distension.      Palpations: Abdomen is soft. There is no mass.      Tenderness: There is no abdominal tenderness. There is no guarding or rebound.      Hernia: No hernia is present.   Musculoskeletal:         General: Normal range of motion.      Cervical back: Normal range of motion and neck supple.      Right foot: Normal range of motion. No deformity, bunion, Charcot foot, foot drop or prominent metatarsal heads.      Left foot: Normal range of motion. No deformity, bunion, Charcot foot, foot drop or prominent metatarsal heads.   Feet:      Right foot:      Protective Sensation: 3 sites tested. 3 sites sensed.      Skin integrity: Callus present. No ulcer or blister.      Toenail Condition: Right toenails are normal.      Left foot:      Protective Sensation: 3 sites tested. 3 sites sensed.      Skin integrity: Callus present. No ulcer or blister.      Toenail Condition: Left toenails are normal.      Comments:      Skin:     General: Skin is warm and dry.      Capillary Refill: Capillary refill takes less than 2 seconds.   Neurological:      General: No focal deficit present.      Mental Status: He is alert and oriented to person, place, and time.      Cranial Nerves: No cranial nerve deficit.   Psychiatric:         Mood and Affect: Mood and affect normal.         Behavior: Behavior normal.         Thought Content: Thought content normal.         Judgment: Judgment normal.        Result Review :{Labs  Result Review  Imaging  Med Tab  Media  Procedures :23}   The following data was reviewed by: Wei Lauren MD on 12/07/2021:  CMP    CMP 3/9/21   Glucose 280 (A)   BUN 16   Creatinine  0.72   Sodium 136   Potassium 4.2   Chloride 97 (A)   Calcium 9.8   Albumin 4.5   Total Bilirubin 0.46   Alkaline Phosphatase 62   AST (SGOT) 33   ALT (SGPT) 58 (A)   (A) Abnormal value            CBC    CBC 3/9/21   WBC 6.72   RBC 5.22   Hemoglobin 15.6   Hematocrit 45.9   MCV 87.9   MCH 29.9   MCHC 34.0   RDW 12.0   Platelets 207      Comments are available for some flowsheets but are not being displayed.           Lipid Panel    Lipid Panel 3/9/21 3/9/21    1225 1225   Total Cholesterol 268 (A)    Triglycerides 710 (A)    HDL Cholesterol 36 (A)    LDL Cholesterol   114 (A)   (A) Abnormal value       Comments are available for some flowsheets but are not being displayed.           TSH    TSH 3/9/21   TSH 1.280           Most Recent A1C    HGBA1C Most Recent 3/9/21   Hemoglobin A1C 9.4 (A)   (A) Abnormal value       Comments are available for some flowsheets but are not being displayed.           PSA    PSA 3/9/21   PSA 0.38                     Assessment and Plan    Diagnoses and all orders for this visit:    1. Vitamin D deficiency (Primary)  -     Vitamin D 25 Hydroxy  -     Vitamin D 25 Hydroxy; Future    2. Mixed hyperlipidemia  -     atorvastatin (LIPITOR) 40 MG tablet; Take 1 tablet by mouth Daily.  Dispense: 90 tablet; Refill: 1  -     fenofibrate (TRICOR) 145 MG tablet; Take 1 tablet by mouth every night at bedtime.  Dispense: 90 tablet; Refill: 1    3. Primary hypertension  -     TSH+Free T4  -     TSH+Free T4; Future  -     hydroCHLOROthiazide (HYDRODIURIL) 25 MG tablet; Take 1 tablet by mouth Daily.  Dispense: 90 tablet; Refill: 1    4. Type 2 diabetes mellitus with hyperglycemia, without long-term current use of insulin ([facility])  -     Ambulatory Referral for Diabetic Eye Exam-Ophthalmology  -     CBC Auto Differential  -     Comprehensive Metabolic Panel  -     Hemoglobin A1c  -     Lipid Panel  -     MicroAlbumin, Urine, Random - Urine, Clean Catch  -     CBC Auto Differential; Future  -      Comprehensive Metabolic Panel; Future  -     Hemoglobin A1c; Future  -     Lipid Panel; Future  -     MicroAlbumin, Urine, Random - Urine, Clean Catch; Future  -     SITagliptin (Januvia) 100 MG tablet; Take 1 tablet by mouth Daily.  Dispense: 90 tablet; Refill: 1  -     metFORMIN (GLUCOPHAGE) 500 MG tablet; Take 2 tablets by mouth 2 (Two) Times a Day With Meals.  Dispense: 360 tablet; Refill: 1    5. Anxiety and depression  -     venlafaxine XR (EFFEXOR-XR) 75 MG 24 hr capsule; Take 1 capsule by mouth Daily.  Dispense: 90 capsule; Refill: 1    6. Screening PSA (prostate specific antigen)  -     PSA Screen; Future    7. Need for influenza vaccination  -     FluLaval/Fluarix/Fluzone >6 Months ([id number])            Follow Up   Return in about 6 months (around 6/7/2022) for Recheck.  Patient was given instructions and counseling regarding his condition or for health maintenance advice. Please see specific information pulled into the AVS if appropriate.

## 2021-12-08 LAB
25(OH)D3 SERPL-MCNC: 21.6 NG/ML
ALBUMIN UR-MCNC: <1.2 MG/DL
HBA1C MFR BLD: 9.22 % (ref 4.8–5.6)

## 2021-12-10 NOTE — PROGRESS NOTES
You have some abnormal labs, including uncontrolled diabetes and cholesterol.  Follow-up in 1-2 weeks to discuss and treat further.  Really watch diet and exercise.

## 2021-12-16 DIAGNOSIS — G47.00 INSOMNIA, UNSPECIFIED TYPE: Primary | ICD-10-CM

## 2021-12-16 DIAGNOSIS — I10 PRIMARY HYPERTENSION: ICD-10-CM

## 2021-12-16 RX ORDER — TRAZODONE HYDROCHLORIDE 50 MG/1
50 TABLET ORAL NIGHTLY
Qty: 90 TABLET | Refills: 1 | Status: SHIPPED | OUTPATIENT
Start: 2021-12-16 | End: 2022-07-20

## 2021-12-16 RX ORDER — LOSARTAN POTASSIUM 100 MG/1
TABLET ORAL
Qty: 90 TABLET | Refills: 0 | Status: SHIPPED | OUTPATIENT
Start: 2021-12-16 | End: 2022-03-03

## 2021-12-16 RX ORDER — LOSARTAN POTASSIUM 50 MG/1
50 TABLET ORAL DAILY
Qty: 90 TABLET | Refills: 1 | Status: SHIPPED | OUTPATIENT
Start: 2021-12-16 | End: 2021-12-16

## 2022-03-03 RX ORDER — LOSARTAN POTASSIUM 100 MG/1
TABLET ORAL
Qty: 90 TABLET | Refills: 0 | Status: SHIPPED | OUTPATIENT
Start: 2022-03-03 | End: 2022-11-08 | Stop reason: SDUPTHER

## 2022-03-10 ENCOUNTER — TELEPHONE (OUTPATIENT)
Dept: FAMILY MEDICINE CLINIC | Facility: CLINIC | Age: 52
End: 2022-03-10

## 2022-03-10 NOTE — TELEPHONE ENCOUNTER
HUB WAS UNABLE TO WARM TRANSFER     Caller: Robby Hernandes    Relationship: Self    Best call back number: [phone]    What is the best time to reach you: ANYTIME     Who are you requesting to speak with (clinical staff, provider,  specific staff member): CLINICAL         What was the call regarding: PATIENT IS CALLING AND WANTS TO CLARIFY MEDICATION, FOR losartan (COZAAR) 100 MG tablet, STATES HE ALSO RECEIVED A 50 MG.     Do you require a callback: YES

## 2022-06-02 ENCOUNTER — OFFICE VISIT (OUTPATIENT)
Dept: FAMILY MEDICINE CLINIC | Facility: CLINIC | Age: 52
End: 2022-06-02

## 2022-06-02 VITALS
SYSTOLIC BLOOD PRESSURE: 114 MMHG | BODY MASS INDEX: 32 KG/M2 | OXYGEN SATURATION: 97 % | WEIGHT: 223 LBS | TEMPERATURE: 96.8 F | DIASTOLIC BLOOD PRESSURE: 72 MMHG | HEART RATE: 85 BPM

## 2022-06-02 DIAGNOSIS — R11.0 CHRONIC NAUSEA: ICD-10-CM

## 2022-06-02 DIAGNOSIS — R11.2 NAUSEA AND VOMITING, UNSPECIFIED VOMITING TYPE: Primary | ICD-10-CM

## 2022-06-02 DIAGNOSIS — E11.65 TYPE 2 DIABETES MELLITUS WITH HYPERGLYCEMIA, WITHOUT LONG-TERM CURRENT USE OF INSULIN: ICD-10-CM

## 2022-06-02 LAB
ALBUMIN SERPL-MCNC: 4.5 G/DL (ref 3.5–5.2)
ALBUMIN/GLOB SERPL: 1.3 G/DL
ALP SERPL-CCNC: 40 U/L (ref 39–117)
ALT SERPL W P-5'-P-CCNC: 26 U/L (ref 1–41)
AMYLASE SERPL-CCNC: 134 U/L (ref 28–100)
ANION GAP SERPL CALCULATED.3IONS-SCNC: 17 MMOL/L (ref 5–15)
AST SERPL-CCNC: 39 U/L (ref 1–40)
BASOPHILS # BLD AUTO: 0.01 10*3/MM3 (ref 0–0.2)
BASOPHILS NFR BLD AUTO: 0.3 % (ref 0–1.5)
BILIRUB SERPL-MCNC: 0.4 MG/DL (ref 0–1.2)
BUN SERPL-MCNC: 12 MG/DL (ref 6–20)
BUN/CREAT SERPL: 12.8 (ref 7–25)
CALCIUM SPEC-SCNC: 9.5 MG/DL (ref 8.6–10.5)
CHLORIDE SERPL-SCNC: 93 MMOL/L (ref 98–107)
CO2 SERPL-SCNC: 27 MMOL/L (ref 22–29)
CREAT SERPL-MCNC: 0.94 MG/DL (ref 0.76–1.27)
DEPRECATED RDW RBC AUTO: 40.3 FL (ref 37–54)
EGFRCR SERPLBLD CKD-EPI 2021: 96.9 ML/MIN/1.73
EOSINOPHIL # BLD AUTO: 0 10*3/MM3 (ref 0–0.4)
EOSINOPHIL NFR BLD AUTO: 0 % (ref 0.3–6.2)
ERYTHROCYTE [DISTWIDTH] IN BLOOD BY AUTOMATED COUNT: 12.4 % (ref 12.3–15.4)
GLOBULIN UR ELPH-MCNC: 3.4 GM/DL
GLUCOSE SERPL-MCNC: 134 MG/DL (ref 65–99)
HCT VFR BLD AUTO: 48.2 % (ref 37.5–51)
HGB BLD-MCNC: 16.3 G/DL (ref 13–17.7)
IMM GRANULOCYTES # BLD AUTO: 0.02 10*3/MM3 (ref 0–0.05)
IMM GRANULOCYTES NFR BLD AUTO: 0.6 % (ref 0–0.5)
LIPASE SERPL-CCNC: 50 U/L (ref 13–60)
LYMPHOCYTES # BLD AUTO: 1.03 10*3/MM3 (ref 0.7–3.1)
LYMPHOCYTES NFR BLD AUTO: 33.1 % (ref 19.6–45.3)
MCH RBC QN AUTO: 30 PG (ref 26.6–33)
MCHC RBC AUTO-ENTMCNC: 33.8 G/DL (ref 31.5–35.7)
MCV RBC AUTO: 88.8 FL (ref 79–97)
MONOCYTES # BLD AUTO: 0.23 10*3/MM3 (ref 0.1–0.9)
MONOCYTES NFR BLD AUTO: 7.4 % (ref 5–12)
NEUTROPHILS NFR BLD AUTO: 1.82 10*3/MM3 (ref 1.7–7)
NEUTROPHILS NFR BLD AUTO: 58.6 % (ref 42.7–76)
NRBC BLD AUTO-RTO: 0 /100 WBC (ref 0–0.2)
PLATELET # BLD AUTO: 219 10*3/MM3 (ref 140–450)
PMV BLD AUTO: 10.7 FL (ref 6–12)
POTASSIUM SERPL-SCNC: 3.2 MMOL/L (ref 3.5–5.2)
PROT SERPL-MCNC: 7.9 G/DL (ref 6–8.5)
RBC # BLD AUTO: 5.43 10*6/MM3 (ref 4.14–5.8)
SODIUM SERPL-SCNC: 137 MMOL/L (ref 136–145)
WBC NRBC COR # BLD: 3.11 10*3/MM3 (ref 3.4–10.8)

## 2022-06-02 PROCEDURE — 85025 COMPLETE CBC W/AUTO DIFF WBC: CPT | Performed by: FAMILY MEDICINE

## 2022-06-02 PROCEDURE — 83690 ASSAY OF LIPASE: CPT | Performed by: FAMILY MEDICINE

## 2022-06-02 PROCEDURE — 99214 OFFICE O/P EST MOD 30 MIN: CPT | Performed by: FAMILY MEDICINE

## 2022-06-02 PROCEDURE — 82150 ASSAY OF AMYLASE: CPT | Performed by: FAMILY MEDICINE

## 2022-06-02 PROCEDURE — 80053 COMPREHEN METABOLIC PANEL: CPT | Performed by: FAMILY MEDICINE

## 2022-06-02 PROCEDURE — 36415 COLL VENOUS BLD VENIPUNCTURE: CPT | Performed by: FAMILY MEDICINE

## 2022-06-02 RX ORDER — PANTOPRAZOLE SODIUM 40 MG/1
40 TABLET, DELAYED RELEASE ORAL DAILY
Qty: 30 TABLET | Refills: 1 | Status: SHIPPED | OUTPATIENT
Start: 2022-06-02 | End: 2022-07-20

## 2022-06-02 RX ORDER — ONDANSETRON 4 MG/1
4 TABLET, FILM COATED ORAL EVERY 8 HOURS PRN
Qty: 90 TABLET | Refills: 0 | Status: SHIPPED | OUTPATIENT
Start: 2022-06-02 | End: 2022-07-20

## 2022-06-02 RX ORDER — DAPAGLIFLOZIN 10 MG/1
10 TABLET, FILM COATED ORAL DAILY
Qty: 30 TABLET | Refills: 5 | Status: SHIPPED | OUTPATIENT
Start: 2022-06-02 | End: 2022-11-08 | Stop reason: SDUPTHER

## 2022-06-02 RX ORDER — SUCRALFATE 1 G/1
1 TABLET ORAL 4 TIMES DAILY
Qty: 120 TABLET | Refills: 1 | Status: SHIPPED | OUTPATIENT
Start: 2022-06-02 | End: 2022-07-20

## 2022-06-02 NOTE — PROGRESS NOTES
Chief Complaint  Nausea (Nausea with vomitting )    Subjective          Robby Hernandes presents to [facility]  Pt has alpha gal and eats chicken and turkey      Nausea symptoms are worse in AM  Discussed labs      Nausea  This is a chronic problem. Episode onset: 6 months. The problem occurs intermittently. The problem has been gradually worsening. Associated symptoms include nausea. Pertinent negatives include no abdominal pain, anorexia, arthralgias, change in bowel habit, chest pain, chills, congestion, coughing, diaphoresis, fatigue, fever, headaches, joint swelling, myalgias, neck pain, numbness, rash, sore throat, swollen glands, urinary symptoms, vertigo, visual change, vomiting or weakness. He has tried nothing for the symptoms.       Objective   Allergies   Allergen Reactions   • Lyrica [Pregabalin] Hallucinations   • Oxycodone-Acetaminophen Itching   • Tree Extract Other (See Comments)     Runny nose, sneezing, congestion     Immunization History   Administered Date(s) Administered   • Flu Vaccine Split Quad 09/25/2017, 10/05/2017, 09/03/2018, 12/05/2019   • FluLaval/Fluarix/Fluzone >6 12/07/2021   • Hepatitis A 04/11/2019   • Influenza Quad Vaccine (Inpatient) 08/10/2016   • Influenza, Unspecified 11/02/2015   • Pneumococcal Polysaccharide (PPSV23) 08/10/2016, 12/05/2019   • Tdap 07/28/2017, 01/07/2019     Past Medical History:   Diagnosis Date   • Anxiety and depression    • Asthma    • Chronic knee pain     RIGHT   • Complex regional pain syndrome     LEFT ANKLE   • COPD (chronic obstructive pulmonary disease) (HCC)    • Diabetes mellitus (HCC)    • Fracture of nasal septum 1987    CAN NOT BREATH OUT OF RIGHT NOSTRIL   • Hiatal hernia    • High cholesterol    • Hip pain, chronic, left    • History of heart attack     PER EKG DR MILIAN   • History of kidney stones    • [place] (hard of hearing)    • Hypertension    • Insomnia    • Low back pain    • Shoulder pain, left    •  Sleep apnea     DOES NOT USE MACHINE   • Tinnitus       Past Surgical History:   Procedure Laterality Date   • ANKLE FUSION Left    • CARPAL TUNNEL RELEASE Right    • CYSTOSCOPY BLADDER STONE LITHOTRIPSY     • SPINAL CORD STIMULATOR IMPLANT      battery is on the right   • TOTAL HIP ARTHROPLASTY Right    • TOTAL HIP ARTHROPLASTY Left 2018    Procedure: LT TOTAL HIP ARTHROPLASTY ANTERIOR WITH HANA TABLE;  Surgeon: Dameon Briggs MD;  Location: [facility];  Service:       Social History     Socioeconomic History   • Marital status: Single   Tobacco Use   • Smoking status: Former Smoker     Packs/day: 2.00     Years: 10.00     Pack years: 20.00     Types: Cigarettes     Start date: 1985     Quit date:      Years since quittin.4   • Smokeless tobacco: Never Used   Vaping Use   • Vaping Use: Never used   Substance and Sexual Activity   • Alcohol use: Yes     Alcohol/week: 15.0 standard drinks     Types: 15 Cans of beer per week   • Drug use: No   • Sexual activity: Defer        Current Outpatient Medications:   •  atorvastatin (LIPITOR) 40 MG tablet, Take 1 tablet by mouth Daily., Disp: 90 tablet, Rfl: 1  •  fenofibrate (TRICOR) 145 MG tablet, Take 1 tablet by mouth every night at bedtime., Disp: 90 tablet, Rfl: 1  •  hydroCHLOROthiazide (HYDRODIURIL) 25 MG tablet, Take 1 tablet by mouth Daily., Disp: 90 tablet, Rfl: 1  •  losartan (COZAAR) 100 MG tablet, TAKE 1 TABLET(100 MG) BY MOUTH EVERY DAY, Disp: 90 tablet, Rfl: 0  •  metFORMIN (GLUCOPHAGE) 500 MG tablet, Take 2 tablets by mouth 2 (Two) Times a Day With Meals., Disp: 360 tablet, Rfl: 1  •  sennosides-docusate sodium (SENOKOT-S) 8.6-50 MG tablet, Take 2 tablets by mouth 2 (Two) Times a Day., Disp: 60 tablet, Rfl: 1  •  SITagliptin (Januvia) 100 MG tablet, Take 1 tablet by mouth Daily., Disp: 90 tablet, Rfl: 1  •  traZODone (DESYREL) 50 MG tablet, Take 1 tablet by mouth Every Night., Disp: 90 tablet, Rfl: 1  •  venlafaxine XR (EFFEXOR-XR) 75 MG  24 hr capsule, Take 1 capsule by mouth Daily., Disp: 90 capsule, Rfl: 1  •  dapagliflozin Propanediol (Farxiga) 10 MG tablet, Take 10 mg by mouth Daily., Disp: 30 tablet, Rfl: 5  •  ondansetron (Zofran) 4 MG tablet, Take 1 tablet by mouth Every 8 (Eight) Hours As Needed for Nausea or Vomiting., Disp: 90 tablet, Rfl: 0  •  pantoprazole (Protonix) 40 MG EC tablet, Take 1 tablet by mouth Daily., Disp: 30 tablet, Rfl: 1  •  sucralfate (Carafate) 1 g tablet, Take 1 tablet by mouth 4 (Four) Times a Day., Disp: 120 tablet, Rfl: 1   Family History   Problem Relation Age of Onset   • Arthritis Mother    • Malig Hyperthermia Neg Hx           Vital Signs:   Vitals:    06/02/22 1107   BP: 114/72   Pulse: 85   Temp: 96.8 °F (36 °C)   SpO2: 97%   Weight: 101 kg (223 lb)       Review of Systems   Constitutional: Negative for chills, diaphoresis, fatigue and fever.   HENT: Negative for congestion and sore throat.    Respiratory: Negative for cough.    Cardiovascular: Negative for chest pain.   Gastrointestinal: Positive for nausea. Negative for abdominal pain, anorexia, change in bowel habit and vomiting.   Musculoskeletal: Negative for arthralgias, joint swelling, myalgias and neck pain.   Skin: Negative for rash.   Neurological: Negative for vertigo, weakness, numbness and headaches.      Physical Exam  Vitals reviewed.   Constitutional:       Appearance: Normal appearance. He is well-developed.   HENT:      Head: Normocephalic and atraumatic.      Right Ear: External ear normal.      Left Ear: External ear normal.      Mouth/Throat:      Pharynx: No oropharyngeal exudate.   Eyes:      Conjunctiva/sclera: Conjunctivae normal.      Pupils: Pupils are equal, round, and reactive to light.   Cardiovascular:      Rate and Rhythm: Normal rate and regular rhythm.      Pulses: Normal pulses.           Dorsalis pedis pulses are 2+ on the right side and 2+ on the left side.      Heart sounds: Normal heart sounds. No murmur heard.    No  friction rub. No gallop.   Pulmonary:      Effort: Pulmonary effort is normal.      Breath sounds: Normal breath sounds. No wheezing or rhonchi.   Abdominal:      General: Abdomen is flat. Bowel sounds are normal. There is no distension.      Palpations: Abdomen is soft. There is no mass.      Tenderness: There is no abdominal tenderness. There is no guarding or rebound.      Hernia: No hernia is present.   Musculoskeletal:         General: Normal range of motion.      Right foot: Normal range of motion. No deformity, bunion, Charcot foot, foot drop or prominent metatarsal heads.      Left foot: Normal range of motion. No deformity, bunion, Charcot foot, foot drop or prominent metatarsal heads.   Feet:      Right foot:      Protective Sensation: 3 sites tested. 3 sites sensed.      Skin integrity: Callus present. No ulcer or blister.      Toenail Condition: Right toenails are normal.      Left foot:      Protective Sensation: 3 sites tested. 3 sites sensed.      Skin integrity: Callus present. No ulcer or blister.      Toenail Condition: Left toenails are normal.      Comments:      Skin:     General: Skin is warm and dry.      Capillary Refill: Capillary refill takes less than 2 seconds.   Neurological:      General: No focal deficit present.      Mental Status: He is alert and oriented to person, place, and time.      Cranial Nerves: No cranial nerve deficit.   Psychiatric:         Mood and Affect: Mood and affect normal.         Behavior: Behavior normal.         Thought Content: Thought content normal.         Judgment: Judgment normal.        Result Review :   The following data was reviewed by: Wei Lauren MD on 06/02/2022:  CMP    CMP 12/7/21   Glucose 269 (A)   BUN 19   Creatinine 0.84   eGFR Non African Am 96   Sodium 138   Potassium 4.3   Chloride 100   Calcium 9.7   Albumin 4.50   Total Bilirubin 0.3   Alkaline Phosphatase 51   AST (SGOT) 18   ALT (SGPT) 28   (A) Abnormal value            CBC    CBC  12/7/21   WBC 8.62   RBC 5.16   Hemoglobin 15.5   Hematocrit 46.5   MCV 90.1   MCH 30.0   MCHC 33.3   RDW 12.2 (A)   Platelets 250   (A) Abnormal value            Lipid Panel    Lipid Panel 12/7/21   Total Cholesterol 155   Triglycerides 303 (A)   HDL Cholesterol 31 (A)   VLDL Cholesterol 49 (A)   LDL Cholesterol  75   LDL/HDL Ratio 2.05   (A) Abnormal value            TSH    TSH 12/7/21   TSH 1.230           Most Recent A1C    HGBA1C Most Recent 12/7/21   Hemoglobin A1C 9.22 (A)   (A) Abnormal value                      Assessment and Plan    Diagnoses and all orders for this visit:    1. Nausea and vomiting, unspecified vomiting type (Primary)  -     US Abdomen Limited; Future  -     H. Pylori Antigen, Stool - Stool, Per Rectum  -     pantoprazole (Protonix) 40 MG EC tablet; Take 1 tablet by mouth Daily.  Dispense: 30 tablet; Refill: 1  -     ondansetron (Zofran) 4 MG tablet; Take 1 tablet by mouth Every 8 (Eight) Hours As Needed for Nausea or Vomiting.  Dispense: 90 tablet; Refill: 0  -     Ambulatory Referral to Gastroenterology  -     sucralfate (Carafate) 1 g tablet; Take 1 tablet by mouth 4 (Four) Times a Day.  Dispense: 120 tablet; Refill: 1  -     CBC Auto Differential  -     Comprehensive Metabolic Panel  -     Amylase  -     Lipase    2. Chronic nausea  -     US Abdomen Limited; Future  -     Ambulatory Referral to Gastroenterology  -     sucralfate (Carafate) 1 g tablet; Take 1 tablet by mouth 4 (Four) Times a Day.  Dispense: 120 tablet; Refill: 1    3. Type 2 diabetes mellitus with hyperglycemia, without long-term current use of insulin ([facility])  -     dapagliflozin Propanediol (Farxiga) 10 MG tablet; Take 10 mg by mouth Daily.  Dispense: 30 tablet; Refill: 5            Follow Up   Return in about 1 week (around 6/9/2022) for Medicare Wellness.  Patient was given instructions and counseling regarding his condition or for health maintenance advice. Please see specific information pulled into the AVS if  appropriate.

## 2022-06-02 NOTE — PROGRESS NOTES
Venipuncture Blood Specimen Collection  Venipuncture performed in left arm by Christina Harris with good hemostasis. Patient tolerated the procedure well without complications.   06/02/22   Christina Harris

## 2022-06-03 ENCOUNTER — TELEPHONE (OUTPATIENT)
Dept: FAMILY MEDICINE CLINIC | Facility: CLINIC | Age: 52
End: 2022-06-03

## 2022-06-03 DIAGNOSIS — E11.65 TYPE 2 DIABETES MELLITUS WITH HYPERGLYCEMIA, WITHOUT LONG-TERM CURRENT USE OF INSULIN: Primary | ICD-10-CM

## 2022-06-03 RX ORDER — BLOOD-GLUCOSE METER
KIT MISCELLANEOUS
Qty: 100 EACH | Refills: 3 | Status: SHIPPED | OUTPATIENT
Start: 2022-06-03

## 2022-06-03 RX ORDER — LANCETS 28 GAUGE
EACH MISCELLANEOUS
Qty: 100 EACH | Refills: 3 | Status: SHIPPED | OUTPATIENT
Start: 2022-06-03

## 2022-06-03 RX ORDER — BLOOD-GLUCOSE METER
1 KIT MISCELLANEOUS DAILY
Qty: 1 KIT | Refills: 0 | Status: SHIPPED | OUTPATIENT
Start: 2022-06-03

## 2022-06-03 NOTE — TELEPHONE ENCOUNTER
"Spoke with pt, he states that he can't go to the ER it would be a \"nightmare\" and he can't afford that bill.  I told him to make sure he is drinking plenty of fluids.  He told that he drinks a gallon of water a day.  I told him that maybe he could substitute on of those glasses of water for a glass of milk or Gatorade.  Thinking he might be flushing his system to much.  He said that he is willing to try that.  I told him to monitor his symptoms and if he passes out again then he has to call the ambulance and go to the ER.  He said he would.  He would like for a blood sugar machine and supplies to be sent to the pharmacy.   "

## 2022-06-03 NOTE — TELEPHONE ENCOUNTER
"Pt called stating that he was started on 4 medications yesterday he took all 4 pills around 3 or 4:00 pm.  And then around 10:00 pm last night he took another sucralfate. Took another round of all 4 pills this AM.  He passed out around 8:00 am.  He called the office at 12:15 and reported this.  He states he feels better but feels \"jet legged\"    I asked if he checked his blood sugar or blood pressure when it happened and he said no he didn't have to check it.    He said he feels like his sugar did drop.   Please advise.   "

## 2022-06-03 NOTE — TELEPHONE ENCOUNTER
Call pt:  After looking at labs, he has low potassium and his WBC's are low, which likely means he has a virus.  With the elctrolyte problems and his symptoms, he likely is becoming dehydrated.  I would strongly advise going to the ER for IVF's.  If he does not have anyone that can drive hime, then he should call an ambulance.  He should not drive either.  After going to the ER for supplementation, he should follow-up in office next week to get labs rechecked to make sure his electrolyte problem has resolved.

## 2022-06-03 NOTE — TELEPHONE ENCOUNTER
Called pt mobile #, LM for him to call back.  Called Home # 1 and it gave someone else's name, no message left.  Called home #2 it stated it was not a working number

## 2022-06-04 ENCOUNTER — APPOINTMENT (OUTPATIENT)
Dept: CT IMAGING | Facility: HOSPITAL | Age: 52
End: 2022-06-04

## 2022-06-04 ENCOUNTER — HOSPITAL ENCOUNTER (EMERGENCY)
Facility: HOSPITAL | Age: 52
Discharge: HOME | End: 2022-06-04
Attending: EMERGENCY MEDICINE | Admitting: EMERGENCY MEDICINE

## 2022-06-04 VITALS
HEIGHT: 70 IN | OXYGEN SATURATION: 95 % | SYSTOLIC BLOOD PRESSURE: 109 MMHG | TEMPERATURE: 98 F | RESPIRATION RATE: 16 BRPM | DIASTOLIC BLOOD PRESSURE: 52 MMHG | BODY MASS INDEX: 31.15 KG/M2 | HEART RATE: 69 BPM | WEIGHT: 217.59 LBS

## 2022-06-04 DIAGNOSIS — R11.2 NAUSEA AND VOMITING, UNSPECIFIED VOMITING TYPE: Primary | ICD-10-CM

## 2022-06-04 LAB
ALBUMIN SERPL-MCNC: 5.3 G/DL (ref 3.5–5.2)
ALBUMIN/GLOB SERPL: 1.6 G/DL
ALP SERPL-CCNC: 51 U/L (ref 39–117)
ALT SERPL W P-5'-P-CCNC: 39 U/L (ref 1–41)
ANION GAP SERPL CALCULATED.3IONS-SCNC: 18.5 MMOL/L (ref 5–15)
AST SERPL-CCNC: 52 U/L (ref 1–40)
BASOPHILS # BLD AUTO: 0.02 10*3/MM3 (ref 0–0.2)
BASOPHILS NFR BLD AUTO: 0.5 % (ref 0–1.5)
BILIRUB SERPL-MCNC: 0.7 MG/DL (ref 0–1.2)
BILIRUB UR QL STRIP: NEGATIVE
BUN SERPL-MCNC: 20 MG/DL (ref 6–20)
BUN/CREAT SERPL: 16.7 (ref 7–25)
CALCIUM SPEC-SCNC: 10.6 MG/DL (ref 8.6–10.5)
CHLORIDE SERPL-SCNC: 88 MMOL/L (ref 98–107)
CLARITY UR: CLEAR
CO2 SERPL-SCNC: 25.5 MMOL/L (ref 22–29)
COLOR UR: YELLOW
CREAT SERPL-MCNC: 1.2 MG/DL (ref 0.76–1.27)
DEPRECATED RDW RBC AUTO: 37.1 FL (ref 37–54)
EGFRCR SERPLBLD CKD-EPI 2021: 72.3 ML/MIN/1.73
EOSINOPHIL # BLD AUTO: 0.01 10*3/MM3 (ref 0–0.4)
EOSINOPHIL NFR BLD AUTO: 0.2 % (ref 0.3–6.2)
ERYTHROCYTE [DISTWIDTH] IN BLOOD BY AUTOMATED COUNT: 12.1 % (ref 12.3–15.4)
GLOBULIN UR ELPH-MCNC: 3.3 GM/DL
GLUCOSE SERPL-MCNC: 180 MG/DL (ref 65–99)
GLUCOSE UR STRIP-MCNC: ABNORMAL MG/DL
HCT VFR BLD AUTO: 51.5 % (ref 37.5–51)
HGB BLD-MCNC: 18.1 G/DL (ref 13–17.7)
HGB UR QL STRIP.AUTO: NEGATIVE
HOLD SPECIMEN: NORMAL
HOLD SPECIMEN: NORMAL
IMM GRANULOCYTES # BLD AUTO: 0.02 10*3/MM3 (ref 0–0.05)
IMM GRANULOCYTES NFR BLD AUTO: 0.5 % (ref 0–0.5)
KETONES UR QL STRIP: ABNORMAL
LEUKOCYTE ESTERASE UR QL STRIP.AUTO: NEGATIVE
LIPASE SERPL-CCNC: 45 U/L (ref 13–60)
LYMPHOCYTES # BLD AUTO: 0.89 10*3/MM3 (ref 0.7–3.1)
LYMPHOCYTES NFR BLD AUTO: 20.8 % (ref 19.6–45.3)
MAGNESIUM SERPL-MCNC: 2 MG/DL (ref 1.6–2.6)
MCH RBC QN AUTO: 29.7 PG (ref 26.6–33)
MCHC RBC AUTO-ENTMCNC: 35.1 G/DL (ref 31.5–35.7)
MCV RBC AUTO: 84.4 FL (ref 79–97)
MONOCYTES # BLD AUTO: 0.42 10*3/MM3 (ref 0.1–0.9)
MONOCYTES NFR BLD AUTO: 9.8 % (ref 5–12)
NEUTROPHILS NFR BLD AUTO: 2.92 10*3/MM3 (ref 1.7–7)
NEUTROPHILS NFR BLD AUTO: 68.2 % (ref 42.7–76)
NITRITE UR QL STRIP: NEGATIVE
NRBC BLD AUTO-RTO: 0 /100 WBC (ref 0–0.2)
PH UR STRIP.AUTO: 6 [PH] (ref 5–8)
PLATELET # BLD AUTO: 234 10*3/MM3 (ref 140–450)
PMV BLD AUTO: 10.2 FL (ref 6–12)
POTASSIUM SERPL-SCNC: 2.9 MMOL/L (ref 3.5–5.2)
PROT SERPL-MCNC: 8.6 G/DL (ref 6–8.5)
PROT UR QL STRIP: ABNORMAL
RBC # BLD AUTO: 6.1 10*6/MM3 (ref 4.14–5.8)
SODIUM SERPL-SCNC: 132 MMOL/L (ref 136–145)
SP GR UR STRIP: >1.03 (ref 1–1.03)
UROBILINOGEN UR QL STRIP: ABNORMAL
WBC NRBC COR # BLD: 4.28 10*3/MM3 (ref 3.4–10.8)
WHOLE BLOOD HOLD COAG: NORMAL
WHOLE BLOOD HOLD SPECIMEN: NORMAL

## 2022-06-04 PROCEDURE — 80053 COMPREHEN METABOLIC PANEL: CPT | Performed by: EMERGENCY MEDICINE

## 2022-06-04 PROCEDURE — 25010000002 ONDANSETRON PER 1 MG: Performed by: EMERGENCY MEDICINE

## 2022-06-04 PROCEDURE — 85025 COMPLETE CBC W/AUTO DIFF WBC: CPT | Performed by: EMERGENCY MEDICINE

## 2022-06-04 PROCEDURE — 83735 ASSAY OF MAGNESIUM: CPT | Performed by: EMERGENCY MEDICINE

## 2022-06-04 PROCEDURE — 36415 COLL VENOUS BLD VENIPUNCTURE: CPT

## 2022-06-04 PROCEDURE — 99283 EMERGENCY DEPT VISIT LOW MDM: CPT

## 2022-06-04 PROCEDURE — 81003 URINALYSIS AUTO W/O SCOPE: CPT | Performed by: EMERGENCY MEDICINE

## 2022-06-04 PROCEDURE — 74177 CT ABD & PELVIS W/CONTRAST: CPT

## 2022-06-04 PROCEDURE — 0 IOPAMIDOL PER 1 ML: Performed by: EMERGENCY MEDICINE

## 2022-06-04 PROCEDURE — 96374 THER/PROPH/DIAG INJ IV PUSH: CPT

## 2022-06-04 PROCEDURE — 83690 ASSAY OF LIPASE: CPT | Performed by: EMERGENCY MEDICINE

## 2022-06-04 RX ORDER — ONDANSETRON 2 MG/ML
4 INJECTION INTRAMUSCULAR; INTRAVENOUS ONCE
Status: COMPLETED | OUTPATIENT
Start: 2022-06-04 | End: 2022-06-04

## 2022-06-04 RX ORDER — ONDANSETRON 4 MG/1
4 TABLET, ORALLY DISINTEGRATING ORAL EVERY 6 HOURS PRN
Qty: 15 TABLET | Refills: 0 | Status: SHIPPED | OUTPATIENT
Start: 2022-06-04 | End: 2022-07-20

## 2022-06-04 RX ORDER — SODIUM CHLORIDE 0.9 % (FLUSH) 0.9 %
10 SYRINGE (ML) INJECTION AS NEEDED
Status: DISCONTINUED | OUTPATIENT
Start: 2022-06-04 | End: 2022-06-04 | Stop reason: HOSPADM

## 2022-06-04 RX ORDER — POTASSIUM CHLORIDE 750 MG/1
40 CAPSULE, EXTENDED RELEASE ORAL ONCE
Status: COMPLETED | OUTPATIENT
Start: 2022-06-04 | End: 2022-06-04

## 2022-06-04 RX ADMIN — SODIUM CHLORIDE 1000 ML: 9 INJECTION, SOLUTION INTRAVENOUS at 11:22

## 2022-06-04 RX ADMIN — POTASSIUM CHLORIDE 40 MEQ: 750 CAPSULE, EXTENDED RELEASE ORAL at 14:00

## 2022-06-04 RX ADMIN — IOPAMIDOL 100 ML: 755 INJECTION, SOLUTION INTRAVENOUS at 12:07

## 2022-06-04 RX ADMIN — ONDANSETRON 4 MG: 2 INJECTION INTRAMUSCULAR; INTRAVENOUS at 11:22

## 2022-06-04 NOTE — ED PROVIDER NOTES
Time: 11:09 AM EDT  Arrived by: private car  Chief Complaint: Abdominal pain, vomiting  History provided by: Patient  History is limited by: N/A     History of Present Illness:  Patient is a 52 y.o. year old male who presents to the emergency department with abdominal pain and vomiting for the past 4 months.  The symptoms are worsening with time.  No diarrhea or constipation reported.  No GI bleeding noted.  Patient specifically denies blood in his vomit or stools.  Feels like he may be dehydrated.  Afebrile.  Patient describes his abdominal pain is intermittent, epigastric and lower abdominal.  Seems worse with certain types of foods.  Patient endorses 40 pound weight loss in the past 4 months.  States his pain was 5 out of 10 in severity on arrival and currently 2 out of 10 on my exam.  Patient has a history of drinking alcohol and has also recently picked up marijuana and an attempt to help with his discomfort but states this may have worsened the issue.    HPI    Similar Symptoms Previously: Yes  Recently seen: Yes      Patient Care Team  Primary Care Provider: Wei Lauren MD    Past Medical History:     Allergies   Allergen Reactions   • Lyrica [Pregabalin] Hallucinations   • Oxycodone-Acetaminophen Itching   • Tree Extract Other (See Comments)     Runny nose, sneezing, congestion     Past Medical History:   Diagnosis Date   • Anxiety and depression    • Asthma    • Chronic knee pain     RIGHT   • Complex regional pain syndrome     LEFT ANKLE   • COPD (chronic obstructive pulmonary disease) (HCC)    • Diabetes mellitus (HCC)    • Fracture of nasal septum 1987    CAN NOT BREATH OUT OF RIGHT NOSTRIL   • Hiatal hernia    • High cholesterol    • Hip pain, chronic, left    • History of heart attack     PER EKG DR MILIAN   • History of kidney stones    • [place] (hard of hearing)    • Hypertension    • Insomnia    • Low back pain    • Shoulder pain, left    • Sleep apnea     DOES NOT USE MACHINE   • Tinnitus       Past Surgical History:   Procedure Laterality Date   • ANKLE FUSION Left    • CARPAL TUNNEL RELEASE Right    • CYSTOSCOPY BLADDER STONE LITHOTRIPSY     • SPINAL CORD STIMULATOR IMPLANT      battery is on the right   • TOTAL HIP ARTHROPLASTY Right    • TOTAL HIP ARTHROPLASTY Left 2/21/2018    Procedure: LT TOTAL HIP ARTHROPLASTY ANTERIOR WITH HANA TABLE;  Surgeon: Dameon Briggs MD;  Location: [facility] OR;  Service:      Family History   Problem Relation Age of Onset   • Arthritis Mother    • Malig Hyperthermia Neg Hx        Home Medications:  Prior to Admission medications    Medication Sig Start Date End Date Taking? Authorizing Provider   atorvastatin (LIPITOR) 40 MG tablet Take 1 tablet by mouth Daily. 12/7/21   Wei Lauren MD   Blood Glucose Monitoring Suppl (FreeStyle Lite) w/Device kit 1 each Daily. 6/3/22   Wei Lauren MD   dapagliflozin Propanediol (Farxiga) 10 MG tablet Take 10 mg by mouth Daily. 6/2/22   Wei Lauren MD   fenofibrate (TRICOR) 145 MG tablet Take 1 tablet by mouth every night at bedtime. 12/7/21   Wei Lauren MD   glucose blood (FREESTYLE LITE) test strip Test BG daily 6/3/22   Wei Lauren MD   hydroCHLOROthiazide (HYDRODIURIL) 25 MG tablet Take 1 tablet by mouth Daily. 12/7/21   Wei Lauren MD   Lancets (freestyle) lancets Test BG daily 6/3/22   Wei Lauren MD   losartan (COZAAR) 100 MG tablet TAKE 1 TABLET(100 MG) BY MOUTH EVERY DAY 3/3/22   Wei Lauren MD   metFORMIN (GLUCOPHAGE) 500 MG tablet Take 2 tablets by mouth 2 (Two) Times a Day With Meals. 12/7/21   Wei Lauren MD   ondansetron (Zofran) 4 MG tablet Take 1 tablet by mouth Every 8 (Eight) Hours As Needed for Nausea or Vomiting. 6/2/22   Wei Lauren MD   pantoprazole (Protonix) 40 MG EC tablet Take 1 tablet by mouth Daily. 6/2/22   Wei Lauren MD   sennosides-docusate sodium (SENOKOT-S) 8.6-50 MG tablet Take 2 tablets  "by mouth 2 (Two) Times a Day. 18   Dameon Briggs MD   SITagliptin (Januvia) 100 MG tablet Take 1 tablet by mouth Daily. 21   Wei Lauren MD   sucralfate (Carafate) 1 g tablet Take 1 tablet by mouth 4 (Four) Times a Day. 22   Wei Lauren MD   traZODone (DESYREL) 50 MG tablet Take 1 tablet by mouth Every Night. 21   Wei Lauren MD   venlafaxine XR (EFFEXOR-XR) 75 MG 24 hr capsule Take 1 capsule by mouth Daily. 21   Wei Lauren MD        Social History:   Social History     Tobacco Use   • Smoking status: Former Smoker     Packs/day: 2.00     Years: 10.00     Pack years: 20.00     Types: Cigarettes     Start date: 1985     Quit date:      Years since quittin.4   • Smokeless tobacco: Never Used   Vaping Use   • Vaping Use: Never used   Substance Use Topics   • Alcohol use: Yes     Alcohol/week: 15.0 standard drinks     Types: 15 Cans of beer per week   • Drug use: No     Recent travel: no     Review of Systems:  Review of Systems   Constitutional: Negative for chills and fever.   HENT: Negative for congestion, rhinorrhea and sore throat.    Eyes: Negative for pain and visual disturbance.   Respiratory: Negative for apnea, cough, chest tightness and shortness of breath.    Cardiovascular: Negative for chest pain and palpitations.   Gastrointestinal: Positive for abdominal pain, nausea and vomiting. Negative for blood in stool, diarrhea and rectal pain.   Genitourinary: Negative for difficulty urinating and dysuria.   Musculoskeletal: Negative for joint swelling and myalgias.   Skin: Negative for color change.   Neurological: Positive for syncope. Negative for seizures and headaches.   Psychiatric/Behavioral: Negative.    All other systems reviewed and are negative.       Physical Exam:  /52   Pulse 69   Temp 98 °F (36.7 °C) (Oral)   Resp 16   Ht 177.8 cm (70\")   Wt 98.7 kg (217 lb 9.5 oz)   SpO2 95%   BMI 31.22 kg/m²     Physical " Exam  Vitals and nursing note reviewed.   Constitutional:       Appearance: Normal appearance.   HENT:      Head: Normocephalic and atraumatic.      Nose: Nose normal.      Mouth/Throat:      Mouth: Mucous membranes are dry.   Eyes:      Extraocular Movements: Extraocular movements intact.      Pupils: Pupils are equal, round, and reactive to light.   Cardiovascular:      Rate and Rhythm: Normal rate and regular rhythm.      Heart sounds: Normal heart sounds.   Pulmonary:      Effort: Pulmonary effort is normal.      Breath sounds: Normal breath sounds.   Abdominal:      General: Bowel sounds are normal.      Palpations: Abdomen is soft.      Tenderness: There is no abdominal tenderness.   Musculoskeletal:         General: No swelling. Normal range of motion.      Cervical back: Normal range of motion and neck supple.   Skin:     General: Skin is warm and dry.      Coloration: Skin is not jaundiced.   Neurological:      General: No focal deficit present.      Mental Status: He is alert and oriented to person, place, and time. Mental status is at baseline.   Psychiatric:         Mood and Affect: Mood normal.         Behavior: Behavior normal.         Judgment: Judgment normal.                Medications in the Emergency Department:  Medications   sodium chloride 0.9 % flush 10 mL (has no administration in time range)   potassium chloride (MICRO-K) CR capsule 40 mEq (has no administration in time range)   sodium chloride 0.9 % bolus 1,000 mL (1,000 mL Intravenous New Bag 6/4/22 1122)   ondansetron (ZOFRAN) injection 4 mg (4 mg Intravenous Given 6/4/22 1122)   iopamidol (ISOVUE-370) 76 % injection 100 mL (100 mL Intravenous Given 6/4/22 1207)        Labs  Lab Results (last 24 hours)     Procedure Component Value Units Date/Time    CBC & Differential [[id number]]  (Abnormal) Collected: 06/04/22 1003    Specimen: Blood from Arm, Right Updated: 06/04/22 1052    Narrative:      The following orders were created for panel  order CBC & Differential.  Procedure                               Abnormality         Status                     ---------                               -----------         ------                     CBC Auto Differential[[id number]]        Abnormal            Final result                 Please view results for these tests on the individual orders.    Comprehensive Metabolic Panel [[id number]]  (Abnormal) Collected: 06/04/22 1003    Specimen: Blood from Arm, Right Updated: 06/04/22 1156     Glucose 180 mg/dL      BUN 20 mg/dL      Creatinine 1.20 mg/dL      Sodium 132 mmol/L      Potassium 2.9 mmol/L      Chloride 88 mmol/L      CO2 25.5 mmol/L      Calcium 10.6 mg/dL      Total Protein 8.6 g/dL      Albumin 5.30 g/dL      ALT (SGPT) 39 U/L      AST (SGOT) 52 U/L      Alkaline Phosphatase 51 U/L      Total Bilirubin 0.7 mg/dL      Globulin 3.3 gm/dL      A/G Ratio 1.6 g/dL      BUN/Creatinine Ratio 16.7     Anion Gap 18.5 mmol/L      eGFR 72.3 mL/min/1.73      Comment: National Kidney Foundation and American Society of Nephrology (ASN) Task Force recommended calculation based on the Chronic Kidney Disease Epidemiology Collaboration (CKD-EPI) equation refit without adjustment for race.       Narrative:      GFR Normal >60  Chronic Kidney Disease <60  Kidney Failure <15      Lipase [[id number]]  (Normal) Collected: 06/04/22 1003    Specimen: Blood from Arm, Right Updated: 06/04/22 1141     Lipase 45 U/L     CBC Auto Differential [[id number]]  (Abnormal) Collected: 06/04/22 1003    Specimen: Blood from Arm, Right Updated: 06/04/22 1052     WBC 4.28 10*3/mm3      RBC 6.10 10*6/mm3      Hemoglobin 18.1 g/dL      Hematocrit 51.5 %      MCV 84.4 fL      MCH 29.7 pg      MCHC 35.1 g/dL      RDW 12.1 %      RDW-SD 37.1 fl      MPV 10.2 fL      Platelets 234 10*3/mm3      Neutrophil % 68.2 %      Lymphocyte % 20.8 %      Monocyte % 9.8 %      Eosinophil % 0.2 %      Basophil % 0.5 %      Immature Grans % 0.5 %      Neutrophils,  Absolute 2.92 10*3/mm3      Lymphocytes, Absolute 0.89 10*3/mm3      Monocytes, Absolute 0.42 10*3/mm3      Eosinophils, Absolute 0.01 10*3/mm3      Basophils, Absolute 0.02 10*3/mm3      Immature Grans, Absolute 0.02 10*3/mm3      nRBC 0.0 /100 WBC     Urinalysis With Microscopic If Indicated (No Culture) - Urine, Clean Catch [[id number]]  (Abnormal) Collected: 06/04/22 1033    Specimen: Urine, Clean Catch Updated: 06/04/22 1053     Color, UA Yellow     Appearance, UA Clear     pH, UA 6.0     Specific Gravity, UA >1.030     Glucose, UA >=1000 mg/dL (3+)     Ketones, UA Trace     Bilirubin, UA Negative     Blood, UA Negative     Protein, UA Trace     Leuk Esterase, UA Negative     Nitrite, UA Negative     Urobilinogen, UA 0.2 E.U./dL    Narrative:      Urine microscopic not indicated.           Imaging:  CT Abdomen Pelvis With Contrast    Result Date: 6/4/2022  PROCEDURE: CT ABDOMEN PELVIS W CONTRAST  COMPARISON: [facility], CT, ABDOMEN/PELVIS WITH CONTRAST, 10/27/2016, 11:01.  INDICATIONS: 40 pound weight loss in 4 months, epigastric and lower abdominal pain.  TECHNIQUE: After obtaining the patient's consent, CT images were created with non-ionic intravenous contrast material.   PROTOCOL:   Standard imaging protocol performed    RADIATION:   DLP: 1152.7 mGy*cm   Automated exposure control was utilized to minimize radiation dose. CONTRAST: 100 cc Isovue 370 I.V. LABS:   eGFR: >60 ml/min/1.73m2  FINDINGS:  The lung bases are clear.  The liver, gallbladder, adrenal glands, kidneys, spleen, and pancreas are unremarkable.  The stomach appears normal.  The small bowel appears normal in caliber and configuration.  The colon appears normal.  The appendix appears normal.  There is no ascites or loculated collection.  No abnormally enlarged lymph nodes are identified.  The pelvis is partially obscured by streak artifact from bilateral hip arthroplasties.  Within this constraint, the rectum, prostate, and  urinary bladder are grossly unremarkable.  A bladder stimulator device is noted.  No aggressive osseous lesions are identified.       No acute process identified within abdomen/pelvis.     LATHA MATTHEWS MD       Electronically Signed and Approved By: LATHA MATTHEWS MD on 6/04/2022 at 12:24               Procedures:  Procedures    Progress                            Medical Decision Making:  MDM  Number of Diagnoses or Management Options  Nausea and vomiting, unspecified vomiting type: established and worsening     Amount and/or Complexity of Data Reviewed  Clinical lab tests: reviewed  Tests in the radiology section of CPT®: reviewed  Decide to obtain previous medical records or to obtain history from someone other than the patient: yes  Independent visualization of images, tracings, or specimens: yes    Risk of Complications, Morbidity, and/or Mortality  Presenting problems: low  Management options: low    Patient Progress  Patient progress: stable       Final diagnoses:   Nausea and vomiting, unspecified vomiting type        Disposition:  ED Disposition     ED Disposition   Discharge    Condition   Stable    Comment   --             This medical record created using voice recognition software.           Lenin Olson MD  06/04/22 [id number]

## 2022-06-04 NOTE — DISCHARGE INSTRUCTIONS
Stay well-hydrated.  Avoid any alcohol/fried fatty greasy foods.  Return to the ER as needed for worsening of symptoms.

## 2022-06-06 ENCOUNTER — CLINICAL SUPPORT (OUTPATIENT)
Dept: FAMILY MEDICINE CLINIC | Facility: CLINIC | Age: 52
End: 2022-06-06

## 2022-06-06 DIAGNOSIS — R11.2 NAUSEA AND VOMITING, UNSPECIFIED VOMITING TYPE: ICD-10-CM

## 2022-06-06 LAB — H. PYLORI ANTIGEN STOOL: NEGATIVE

## 2022-06-06 PROCEDURE — 87338 HPYLORI STOOL AG IA: CPT | Performed by: FAMILY MEDICINE

## 2022-06-09 ENCOUNTER — OFFICE VISIT (OUTPATIENT)
Dept: FAMILY MEDICINE CLINIC | Facility: CLINIC | Age: 52
End: 2022-06-09

## 2022-06-09 VITALS
WEIGHT: 214.4 LBS | SYSTOLIC BLOOD PRESSURE: 126 MMHG | BODY MASS INDEX: 30.76 KG/M2 | TEMPERATURE: 96.6 F | OXYGEN SATURATION: 100 % | HEART RATE: 95 BPM | DIASTOLIC BLOOD PRESSURE: 84 MMHG

## 2022-06-09 DIAGNOSIS — E55.9 VITAMIN D DEFICIENCY: ICD-10-CM

## 2022-06-09 DIAGNOSIS — Z72.0 TOBACCO ABUSE: ICD-10-CM

## 2022-06-09 DIAGNOSIS — Z12.5 SCREENING PSA (PROSTATE SPECIFIC ANTIGEN): ICD-10-CM

## 2022-06-09 DIAGNOSIS — F41.9 ANXIETY AND DEPRESSION: ICD-10-CM

## 2022-06-09 DIAGNOSIS — Z00.00 MEDICARE ANNUAL WELLNESS VISIT, SUBSEQUENT: ICD-10-CM

## 2022-06-09 DIAGNOSIS — I10 PRIMARY HYPERTENSION: ICD-10-CM

## 2022-06-09 DIAGNOSIS — E11.65 TYPE 2 DIABETES MELLITUS WITH HYPERGLYCEMIA, WITHOUT LONG-TERM CURRENT USE OF INSULIN: Primary | ICD-10-CM

## 2022-06-09 DIAGNOSIS — F32.A ANXIETY AND DEPRESSION: ICD-10-CM

## 2022-06-09 DIAGNOSIS — Z23 NEED FOR PNEUMOCOCCAL VACCINE: ICD-10-CM

## 2022-06-09 DIAGNOSIS — Z87.891 PERSONAL HISTORY OF NICOTINE DEPENDENCE: ICD-10-CM

## 2022-06-09 PROBLEM — G47.30 SLEEP APNEA: Status: ACTIVE | Noted: 2022-06-09

## 2022-06-09 PROBLEM — E11.9 NON-INSULIN DEPENDENT TYPE 2 DIABETES MELLITUS: Status: ACTIVE | Noted: 2017-03-09

## 2022-06-09 PROBLEM — N40.0 BENIGN ENLARGEMENT OF PROSTATE: Status: ACTIVE | Noted: 2022-06-09

## 2022-06-09 PROBLEM — J30.9 ALLERGIC RHINITIS: Status: ACTIVE | Noted: 2022-06-09

## 2022-06-09 LAB
25(OH)D3 SERPL-MCNC: 34.4 NG/ML (ref 30–100)
ALBUMIN SERPL-MCNC: 5.1 G/DL (ref 3.5–5.2)
ALBUMIN UR-MCNC: <1.2 MG/DL
ALBUMIN/GLOB SERPL: 2 G/DL
ALP SERPL-CCNC: 50 U/L (ref 39–117)
ALT SERPL W P-5'-P-CCNC: 52 U/L (ref 1–41)
ANION GAP SERPL CALCULATED.3IONS-SCNC: 14.6 MMOL/L (ref 5–15)
AST SERPL-CCNC: 45 U/L (ref 1–40)
BASOPHILS # BLD AUTO: 0.03 10*3/MM3 (ref 0–0.2)
BASOPHILS NFR BLD AUTO: 0.5 % (ref 0–1.5)
BILIRUB SERPL-MCNC: 0.8 MG/DL (ref 0–1.2)
BUN SERPL-MCNC: 14 MG/DL (ref 6–20)
BUN/CREAT SERPL: 16.1 (ref 7–25)
CALCIUM SPEC-SCNC: 10.1 MG/DL (ref 8.6–10.5)
CHLORIDE SERPL-SCNC: 95 MMOL/L (ref 98–107)
CHOLEST SERPL-MCNC: 118 MG/DL (ref 0–200)
CO2 SERPL-SCNC: 26.4 MMOL/L (ref 22–29)
CREAT SERPL-MCNC: 0.87 MG/DL (ref 0.76–1.27)
DEPRECATED RDW RBC AUTO: 40.1 FL (ref 37–54)
EGFRCR SERPLBLD CKD-EPI 2021: 103.2 ML/MIN/1.73
EOSINOPHIL # BLD AUTO: 0.08 10*3/MM3 (ref 0–0.4)
EOSINOPHIL NFR BLD AUTO: 1.2 % (ref 0.3–6.2)
ERYTHROCYTE [DISTWIDTH] IN BLOOD BY AUTOMATED COUNT: 12.6 % (ref 12.3–15.4)
GLOBULIN UR ELPH-MCNC: 2.5 GM/DL
GLUCOSE SERPL-MCNC: 112 MG/DL (ref 65–99)
HBA1C MFR BLD: 6.7 % (ref 4.8–5.6)
HCT VFR BLD AUTO: 48.1 % (ref 37.5–51)
HCV AB SER DONR QL: NORMAL
HDLC SERPL-MCNC: 34 MG/DL (ref 40–60)
HGB BLD-MCNC: 16.2 G/DL (ref 13–17.7)
IMM GRANULOCYTES # BLD AUTO: 0.04 10*3/MM3 (ref 0–0.05)
IMM GRANULOCYTES NFR BLD AUTO: 0.6 % (ref 0–0.5)
LDLC SERPL CALC-MCNC: 54 MG/DL (ref 0–100)
LDLC/HDLC SERPL: 1.41 {RATIO}
LYMPHOCYTES # BLD AUTO: 1.76 10*3/MM3 (ref 0.7–3.1)
LYMPHOCYTES NFR BLD AUTO: 26.6 % (ref 19.6–45.3)
MCH RBC QN AUTO: 29.6 PG (ref 26.6–33)
MCHC RBC AUTO-ENTMCNC: 33.7 G/DL (ref 31.5–35.7)
MCV RBC AUTO: 87.8 FL (ref 79–97)
MONOCYTES # BLD AUTO: 0.76 10*3/MM3 (ref 0.1–0.9)
MONOCYTES NFR BLD AUTO: 11.5 % (ref 5–12)
NEUTROPHILS NFR BLD AUTO: 3.94 10*3/MM3 (ref 1.7–7)
NEUTROPHILS NFR BLD AUTO: 59.6 % (ref 42.7–76)
NRBC BLD AUTO-RTO: 0 /100 WBC (ref 0–0.2)
PLATELET # BLD AUTO: 222 10*3/MM3 (ref 140–450)
PMV BLD AUTO: 10.7 FL (ref 6–12)
POTASSIUM SERPL-SCNC: 3.6 MMOL/L (ref 3.5–5.2)
PROT SERPL-MCNC: 7.6 G/DL (ref 6–8.5)
PSA SERPL-MCNC: 0.43 NG/ML (ref 0–4)
RBC # BLD AUTO: 5.48 10*6/MM3 (ref 4.14–5.8)
SODIUM SERPL-SCNC: 136 MMOL/L (ref 136–145)
T4 FREE SERPL-MCNC: 1.59 NG/DL (ref 0.93–1.7)
TRIGL SERPL-MCNC: 180 MG/DL (ref 0–150)
TSH SERPL DL<=0.05 MIU/L-ACNC: 1.78 UIU/ML (ref 0.27–4.2)
VLDLC SERPL-MCNC: 30 MG/DL (ref 5–40)
WBC NRBC COR # BLD: 6.61 10*3/MM3 (ref 3.4–10.8)

## 2022-06-09 PROCEDURE — 85025 COMPLETE CBC W/AUTO DIFF WBC: CPT | Performed by: FAMILY MEDICINE

## 2022-06-09 PROCEDURE — 84443 ASSAY THYROID STIM HORMONE: CPT | Performed by: FAMILY MEDICINE

## 2022-06-09 PROCEDURE — G0103 PSA SCREENING: HCPCS | Performed by: FAMILY MEDICINE

## 2022-06-09 PROCEDURE — 82306 VITAMIN D 25 HYDROXY: CPT | Performed by: FAMILY MEDICINE

## 2022-06-09 PROCEDURE — 84439 ASSAY OF FREE THYROXINE: CPT | Performed by: FAMILY MEDICINE

## 2022-06-09 PROCEDURE — 99213 OFFICE O/P EST LOW 20 MIN: CPT | Performed by: FAMILY MEDICINE

## 2022-06-09 PROCEDURE — 1159F MED LIST DOCD IN RCRD: CPT | Performed by: FAMILY MEDICINE

## 2022-06-09 PROCEDURE — 80061 LIPID PANEL: CPT | Performed by: FAMILY MEDICINE

## 2022-06-09 PROCEDURE — 83036 HEMOGLOBIN GLYCOSYLATED A1C: CPT | Performed by: FAMILY MEDICINE

## 2022-06-09 PROCEDURE — 82043 UR ALBUMIN QUANTITATIVE: CPT | Performed by: FAMILY MEDICINE

## 2022-06-09 PROCEDURE — 80053 COMPREHEN METABOLIC PANEL: CPT | Performed by: FAMILY MEDICINE

## 2022-06-09 PROCEDURE — G0439 PPPS, SUBSEQ VISIT: HCPCS | Performed by: FAMILY MEDICINE

## 2022-06-09 PROCEDURE — 1170F FXNL STATUS ASSESSED: CPT | Performed by: FAMILY MEDICINE

## 2022-06-09 PROCEDURE — 86803 HEPATITIS C AB TEST: CPT | Performed by: FAMILY MEDICINE

## 2022-06-09 RX ORDER — VENLAFAXINE HYDROCHLORIDE 150 MG/1
150 CAPSULE, EXTENDED RELEASE ORAL DAILY
Qty: 30 CAPSULE | Refills: 5 | Status: SHIPPED | OUTPATIENT
Start: 2022-06-09 | End: 2022-11-08 | Stop reason: SDUPTHER

## 2022-06-09 RX ORDER — ASPIRIN 81 MG/1
81 TABLET ORAL DAILY
Qty: 100 TABLET | Refills: 1 | Status: SHIPPED | OUTPATIENT
Start: 2022-06-09 | End: 2023-01-23

## 2022-06-09 NOTE — PROGRESS NOTES
Chief Complaint  Medicare Wellness-subsequent    Subjective    History of Present Illness    Robby Hernandes presents for Annual Wellness Visit as well as for follow-up on chronic medical problems including diabetes, hypertension, hyperlipidemia and depression. Depression- uncontrolled- pt has no SI or HI.  HTN controlled- continue meds.  Pt tolerating meds well.    Past Medical History:   Diagnosis Date   • Anxiety and depression    • Asthma    • Chronic knee pain    • Complex regional pain syndrome    • COPD (chronic obstructive pulmonary disease) (HCC)    • Diabetes mellitus (HCC)    • Fracture of nasal septum    • Hiatal hernia    • High cholesterol    • Hip pain, chronic, left    • History of heart attack    • History of kidney stones    • [place] (hard of hearing)    • Hypertension    • Insomnia    • Low back pain    • Shoulder pain, left    • Sleep apnea    • Tinnitus      Past Surgical History:   Procedure Laterality Date   • ANKLE FUSION Left    • CARPAL TUNNEL RELEASE Right    • CYSTOSCOPY BLADDER STONE LITHOTRIPSY     • SPINAL CORD STIMULATOR IMPLANT      battery is on the right   • TOTAL HIP ARTHROPLASTY Right    • TOTAL HIP ARTHROPLASTY Left 2018    Procedure: LT TOTAL HIP ARTHROPLASTY ANTERIOR WITH HANA TABLE;  Surgeon: Dameon Briggs MD;  Location: [facility];  Service:      Family History   Problem Relation Age of Onset   • Arthritis Mother    • [place] Hyperthermia Neg Hx      Social History     Tobacco Use   • Smoking status: Former Smoker     Packs/day: 2.00     Years: 10.00     Pack years: 20.00     Types: Cigarettes     Start date: 1985     Quit date:      Years since quittin.4   • Smokeless tobacco: Never Used   Substance Use Topics   • Alcohol use: Yes     Alcohol/week: 15.0 standard drinks     Types: 15 Cans of beer per week     Vitals:    22 1022   BP: 126/84   Pulse: 95   Temp: 96.6 °F (35.9 °C)   SpO2: 100%   Weight: 97.3 kg (214 lb 6.4 oz)     Body mass index is  30.76 kg/m².    Result Review :   The following data was reviewed by: Wei Lauren MD on 06/09/2022:  CMP    CMP 12/7/21 6/2/22 6/4/22   Glucose 269 (A) 134 (A) 180 (A)   BUN 19 12 20   Creatinine 0.84 0.94 1.20   eGFR Non African Am 96     Sodium 138 137 132 (A)   Potassium 4.3 3.2 (A) 2.9 (A)   Chloride 100 93 (A) 88 (A)   Calcium 9.7 9.5 10.6 (A)   Albumin 4.50 4.50 5.30 (A)   Total Bilirubin 0.3 0.4 0.7   Alkaline Phosphatase 51 40 51   AST (SGOT) 18 39 52 (A)   ALT (SGPT) 28 26 39   (A) Abnormal value            CBC    CBC 12/7/21 6/2/22 6/4/22   WBC 8.62 3.11 (A) 4.28   RBC 5.16 5.43 6.10 (A)   Hemoglobin 15.5 16.3 18.1 (A)   Hematocrit 46.5 48.2 51.5 (A)   MCV 90.1 88.8 84.4   MCH 30.0 30.0 29.7   MCHC 33.3 33.8 35.1   RDW 12.2 (A) 12.4 12.1 (A)   Platelets 250 219 234   (A) Abnormal value            Lipid Panel    Lipid Panel 12/7/21   Total Cholesterol 155   Triglycerides 303 (A)   HDL Cholesterol 31 (A)   VLDL Cholesterol 49 (A)   LDL Cholesterol  75   LDL/HDL Ratio 2.05   (A) Abnormal value            TSH    TSH 12/7/21   TSH 1.230           Most Recent A1C    HGBA1C Most Recent 12/7/21   Hemoglobin A1C 9.22 (A)   (A) Abnormal value            Microalbumin    Microalbumin 12/7/21   Microalbumin, Urine <1.2                          Subsequent Medicare Wellness Visit    Chief Complaint   Patient presents with   • Medicare Wellness-subsequent      Subjective    History of Present Illness:  Robby Hernandes is a 52 y.o. male who presents for a Subsequent Medicare Wellness Visit.    The following portions of the patient's history were reviewed and   updated as appropriate:   He  has a past medical history of Anxiety and depression, Asthma, Chronic knee pain, Complex regional pain syndrome, COPD (chronic obstructive pulmonary disease) (HCC), Diabetes mellitus (HCC), Fracture of nasal septum (1987), Hiatal hernia, High cholesterol, Hip pain, chronic, left, History of heart attack, History of kidney stones, [place]  (hard of hearing), Hypertension, Insomnia, Low back pain, Shoulder pain, left, Sleep apnea, and Tinnitus.  He does not have any pertinent problems on file.  He  has a past surgical history that includes Ankle Fusion (Left); Total hip arthroplasty (Right); cystoscopy bladder stone lithotripsy; Spinal cord stimulator implant; Carpal tunnel release (Right); and Total hip arthroplasty (Left, 2/21/2018).  His family history includes Arthritis in his mother.  He  reports that he quit smoking about 14 years ago. His smoking use included cigarettes. He started smoking about 36 years ago. He has a 20.00 pack-year smoking history. He has never used smokeless tobacco. He reports current alcohol use of about 15.0 standard drinks of alcohol per week. He reports that he does not use drugs.  Current Outpatient Medications   Medication Sig Dispense Refill   • atorvastatin (LIPITOR) 40 MG tablet Take 1 tablet by mouth Daily. 90 tablet 1   • Blood Glucose Monitoring Suppl (FreeStyle Lite) w/Device kit 1 each Daily. 1 kit 0   • dapagliflozin Propanediol (Farxiga) 10 MG tablet Take 10 mg by mouth Daily. 30 tablet 5   • fenofibrate (TRICOR) 145 MG tablet Take 1 tablet by mouth every night at bedtime. 90 tablet 1   • glucose blood (FREESTYLE LITE) test strip Test BG daily 100 each 3   • hydroCHLOROthiazide (HYDRODIURIL) 25 MG tablet Take 1 tablet by mouth Daily. 90 tablet 1   • Lancets (freestyle) lancets Test BG daily 100 each 3   • losartan (COZAAR) 100 MG tablet TAKE 1 TABLET(100 MG) BY MOUTH EVERY DAY 90 tablet 0   • metFORMIN (GLUCOPHAGE) 500 MG tablet Take 2 tablets by mouth 2 (Two) Times a Day With Meals. 360 tablet 1   • ondansetron (Zofran) 4 MG tablet Take 1 tablet by mouth Every 8 (Eight) Hours As Needed for Nausea or Vomiting. 90 tablet 0   • ondansetron ODT (ZOFRAN-ODT) 4 MG disintegrating tablet Place 1 tablet on the tongue Every 6 (Six) Hours As Needed for Nausea or Vomiting. 15 tablet 0   • pantoprazole (Protonix) 40 MG  EC tablet Take 1 tablet by mouth Daily. 30 tablet 1   • sennosides-docusate sodium (SENOKOT-S) 8.6-50 MG tablet Take 2 tablets by mouth 2 (Two) Times a Day. 60 tablet 1   • SITagliptin (Januvia) 100 MG tablet Take 1 tablet by mouth Daily. 90 tablet 1   • sucralfate (Carafate) 1 g tablet Take 1 tablet by mouth 4 (Four) Times a Day. 120 tablet 1   • traZODone (DESYREL) 50 MG tablet Take 1 tablet by mouth Every Night. 90 tablet 1   • venlafaxine XR (EFFEXOR-XR) 150 MG 24 hr capsule Take 1 capsule by mouth Daily. 30 capsule 5   • aspirin (aspirin) 81 MG EC tablet Take 1 tablet by mouth Daily. 100 tablet 1   • Pneumococcal 20-Ce Conj Vacc (PREVNAR-20) 0.5 ML suspension prefilled syringe vaccine Inject 0.5 mL into the appropriate muscle as directed by prescriber 1 (One) Time for 1 dose. 0.5 mL 0     No current facility-administered medications for this visit.     Current Outpatient Medications on File Prior to Visit   Medication Sig   • atorvastatin (LIPITOR) 40 MG tablet Take 1 tablet by mouth Daily.   • Blood Glucose Monitoring Suppl (FreeStyle Lite) w/Device kit 1 each Daily.   • dapagliflozin Propanediol (Farxiga) 10 MG tablet Take 10 mg by mouth Daily.   • fenofibrate (TRICOR) 145 MG tablet Take 1 tablet by mouth every night at bedtime.   • glucose blood (FREESTYLE LITE) test strip Test BG daily   • hydroCHLOROthiazide (HYDRODIURIL) 25 MG tablet Take 1 tablet by mouth Daily.   • Lancets (freestyle) lancets Test BG daily   • losartan (COZAAR) 100 MG tablet TAKE 1 TABLET(100 MG) BY MOUTH EVERY DAY   • metFORMIN (GLUCOPHAGE) 500 MG tablet Take 2 tablets by mouth 2 (Two) Times a Day With Meals.   • ondansetron (Zofran) 4 MG tablet Take 1 tablet by mouth Every 8 (Eight) Hours As Needed for Nausea or Vomiting.   • ondansetron ODT (ZOFRAN-ODT) 4 MG disintegrating tablet Place 1 tablet on the tongue Every 6 (Six) Hours As Needed for Nausea or Vomiting.   • pantoprazole (Protonix) 40 MG EC tablet Take 1 tablet by mouth  Daily.   • sennosides-docusate sodium (SENOKOT-S) 8.6-50 MG tablet Take 2 tablets by mouth 2 (Two) Times a Day.   • SITagliptin (Januvia) 100 MG tablet Take 1 tablet by mouth Daily.   • sucralfate (Carafate) 1 g tablet Take 1 tablet by mouth 4 (Four) Times a Day.   • traZODone (DESYREL) 50 MG tablet Take 1 tablet by mouth Every Night.   • [DISCONTINUED] venlafaxine XR (EFFEXOR-XR) 75 MG 24 hr capsule Take 1 capsule by mouth Daily.     No current facility-administered medications on file prior to visit.     He is allergic to lyrica [pregabalin], oxycodone-acetaminophen, and tree extract..    Compared to one year ago, the patient feels his physical   health is the same.    Compared to one year ago, the patient feels his mental   health is the same.    Recent Hospitalizations:  He was not admitted to the hospital during the last year.       Current Medical Providers:  Patient Care Team:  Wei Lauren MD as PCP - General (Family Medicine)  Perfecto Rutledge MD as Consulting Physician (Cardiology)    Outpatient Medications Prior to Visit   Medication Sig Dispense Refill   • atorvastatin (LIPITOR) 40 MG tablet Take 1 tablet by mouth Daily. 90 tablet 1   • Blood Glucose Monitoring Suppl (FreeStyle Lite) w/Device kit 1 each Daily. 1 kit 0   • dapagliflozin Propanediol (Farxiga) 10 MG tablet Take 10 mg by mouth Daily. 30 tablet 5   • fenofibrate (TRICOR) 145 MG tablet Take 1 tablet by mouth every night at bedtime. 90 tablet 1   • glucose blood (FREESTYLE LITE) test strip Test BG daily 100 each 3   • hydroCHLOROthiazide (HYDRODIURIL) 25 MG tablet Take 1 tablet by mouth Daily. 90 tablet 1   • Lancets (freestyle) lancets Test BG daily 100 each 3   • losartan (COZAAR) 100 MG tablet TAKE 1 TABLET(100 MG) BY MOUTH EVERY DAY 90 tablet 0   • metFORMIN (GLUCOPHAGE) 500 MG tablet Take 2 tablets by mouth 2 (Two) Times a Day With Meals. 360 tablet 1   • ondansetron (Zofran) 4 MG tablet Take 1 tablet by mouth Every 8 (Eight)  Hours As Needed for Nausea or Vomiting. 90 tablet 0   • ondansetron ODT (ZOFRAN-ODT) 4 MG disintegrating tablet Place 1 tablet on the tongue Every 6 (Six) Hours As Needed for Nausea or Vomiting. 15 tablet 0   • pantoprazole (Protonix) 40 MG EC tablet Take 1 tablet by mouth Daily. 30 tablet 1   • sennosides-docusate sodium (SENOKOT-S) 8.6-50 MG tablet Take 2 tablets by mouth 2 (Two) Times a Day. 60 tablet 1   • SITagliptin (Januvia) 100 MG tablet Take 1 tablet by mouth Daily. 90 tablet 1   • sucralfate (Carafate) 1 g tablet Take 1 tablet by mouth 4 (Four) Times a Day. 120 tablet 1   • traZODone (DESYREL) 50 MG tablet Take 1 tablet by mouth Every Night. 90 tablet 1   • venlafaxine XR (EFFEXOR-XR) 75 MG 24 hr capsule Take 1 capsule by mouth Daily. 90 capsule 1     No facility-administered medications prior to visit.       No opioid medication identified on active medication list. I have reviewed chart for other potential  high risk medication/s and harmful drug interactions in the elderly.          Aspirin is not on active medication list.  Aspirin use is indicated based on review of current medical condition/s. Pros and cons of this therapy have been discussed with this patient. Benefits of this medication outweigh potential harm.  Patient has been instructed to start taking this medication..    Patient Active Problem List   Diagnosis   • Osteoarthritis of left hip   • Type 2 diabetes mellitus with hyperglycemia (HCC)   • Hypertension   • COPD (chronic obstructive pulmonary disease) (HCC)   • Sinus tachycardia   • Allergic rhinitis   • Anxiety   • Arthritis   • Sleep apnea   • Benign enlargement of prostate   • Cervical nerve root injury   • Depressive disorder   • Non-insulin dependent type 2 diabetes mellitus (HCC)     Advance Care Planning  Advance Directive is not on file.  ACP discussion was held with the patient during this visit. Patient does not have an advance directive, information provided.    Review of  "Systems   Constitutional: Negative for fatigue and fever.   HENT: Negative for sore throat.    Eyes: Negative for visual disturbance.   Respiratory: Negative for cough, chest tightness, shortness of breath and wheezing.    Cardiovascular: Negative for chest pain, palpitations and leg swelling.   Gastrointestinal: Negative for abdominal pain, diarrhea, nausea and vomiting.   Skin: Negative for rash.   Neurological: Negative for light-headedness.   Psychiatric/Behavioral: Positive for dysphoric mood. Negative for behavioral problems, sleep disturbance and suicidal ideas. The patient is not nervous/anxious and is not hyperactive.         Objective    Vitals:    06/09/22 1022   BP: 126/84   Pulse: 95   Temp: 96.6 °F (35.9 °C)   SpO2: 100%   Weight: 97.3 kg (214 lb 6.4 oz)     Estimated body mass index is 30.76 kg/m² as calculated from the following:    Height as of 6/4/22: 177.8 cm (70\").    Weight as of this encounter: 97.3 kg (214 lb 6.4 oz).    BMI is >= 30 and <35. (Class 1 Obesity). The following options were offered after discussion;: exercise counseling/recommendations and nutrition counseling/recommendations      Does the patient have evidence of cognitive impairment? No    Physical Exam  Vitals reviewed.   Constitutional:       Appearance: Normal appearance. He is well-developed.   HENT:      Head: Normocephalic and atraumatic.      Right Ear: External ear normal.      Left Ear: External ear normal.      Mouth/Throat:      Pharynx: No oropharyngeal exudate.   Eyes:      Conjunctiva/sclera: Conjunctivae normal.      Pupils: Pupils are equal, round, and reactive to light.   Cardiovascular:      Rate and Rhythm: Normal rate and regular rhythm.      Pulses: Normal pulses.      Heart sounds: Normal heart sounds. No murmur heard.    No friction rub. No gallop.   Pulmonary:      Effort: Pulmonary effort is normal.      Breath sounds: Normal breath sounds. No wheezing or rhonchi.   Abdominal:      General: Abdomen is " flat. Bowel sounds are normal. There is no distension.      Palpations: Abdomen is soft. There is no mass.      Tenderness: There is no abdominal tenderness. There is no guarding or rebound.      Hernia: No hernia is present.   Musculoskeletal:         General: Normal range of motion.   Skin:     General: Skin is warm and dry.      Capillary Refill: Capillary refill takes less than 2 seconds.   Neurological:      General: No focal deficit present.      Mental Status: He is alert and oriented to person, place, and time.      Cranial Nerves: No cranial nerve deficit.   Psychiatric:         Mood and Affect: Mood and affect normal.         Behavior: Behavior normal.         Thought Content: Thought content normal.         Judgment: Judgment normal.                 HEALTH RISK ASSESSMENT    Smoking Status:  Social History     Tobacco Use   Smoking Status Former Smoker   • Packs/day: 2.00   • Years: 10.00   • Pack years: 20.00   • Types: Cigarettes   • Start date: 1985   • Quit date:    • Years since quittin.4   Smokeless Tobacco Never Used     Alcohol Consumption:  Social History     Substance and Sexual Activity   Alcohol Use Yes   • Alcohol/week: 15.0 standard drinks   • Types: 15 Cans of beer per week     Fall Risk Screen:    [facility], and patient is at HIGH risk for falls. Assessment completed on:2022    Depression Screening:  PHQ-2/PHQ-9 Depression Screening 2022   Little Interest or Pleasure in Doing Things 3-->nearly every day   Feeling Down, Depressed or Hopeless 0-->not at all   Trouble Falling or Staying Asleep, or Sleeping Too Much 3-->nearly every day   Feeling Tired or Having Little Energy 3-->nearly every day   Poor Appetite or Overeating 3-->nearly every day   Feeling Bad about Yourself - or that You are a Failure or Have Let Yourself or Your Family Down 0-->not at all   Trouble Concentrating on Things, Such as Reading the Newspaper or Watching  Television 0-->not at all   Moving or Speaking So Slowly that Other People Could Have Noticed? Or the Opposite - Being So Fidgety 1-->several days   Thoughts that You Would be Better Off Dead or of Hurting Yourself in Some Way 0-->not at all   PHQ-9: Brief Depression Severity Measure Score 13   If You Checked Off Any Problems, How Difficult Have These Problems Made It For You to Do Your Work, Take Care of Things at Home, or Get Along with Other People? somewhat difficult       Health Habits and Functional and Cognitive Screening:  Functional & Cognitive Status 6/9/2022   Do you have difficulty preparing food and eating? Yes   Do you have difficulty bathing yourself, getting dressed or grooming yourself? No   Do you have difficulty using the toilet? No   Do you have difficulty moving around from place to place? Yes   Do you have trouble with steps or getting out of a bed or a chair? Yes   Current Diet Unhealthy Diet   Dental Exam Not up to date   Eye Exam Not up to date   Exercise (times per week) 0 times per week   Current Exercises Include No Regular Exercise;Walking   Do you need help using the phone?  Yes   Are you deaf or do you have serious difficulty hearing?  Yes   Do you need help with transportation? Yes   Do you need help shopping? No   Do you need help preparing meals?  No   Do you need help with housework?  No   Do you need help with laundry? No   Do you need help taking your medications? No   Do you need help managing money? No   Do you ever drive or ride in a car without wearing a seat belt? No   Have you felt unusual stress, anger or loneliness in the last month? No   Who do you live with? Alone   If you need help, do you have trouble finding someone available to you? Yes   Have you been bothered in the last four weeks by sexual problems? No   Do you have difficulty concentrating, remembering or making decisions? Yes       Age-appropriate Screening Schedule:  Refer to the list below for future  screening recommendations based on patient's age, sex and/or medical conditions. Orders for these recommended tests are listed in the plan section. The patient has been provided with a written plan.    Health Maintenance   Topic Date Due   • ZOSTER VACCINE (1 of 2) Never done   • DIABETIC EYE EXAM  11/06/2020   • HEMOGLOBIN A1C  06/07/2022   • INFLUENZA VACCINE  08/01/2022   • LIPID PANEL  12/07/2022   • URINE MICROALBUMIN  12/07/2022   • DIABETIC FOOT EXAM  06/02/2023   • TDAP/TD VACCINES (3 - Td or Tdap) 01/07/2029              Assessment & Plan   CMS Preventative Services Quick Reference  Risk Factors Identified During Encounter  Immunizations Discussed/Encouraged (specific Immunizations; Prevnar 20 (Pneumococcal 20-valent conjugate) and Shingrix  Obesity/Overweight   The above risks/problems have been discussed with the patient.  Follow up actions/plans if indicated are seen below in the Assessment/Plan Section.  Pertinent information has been shared with the patient in the After Visit Summary.    Diagnoses and all orders for this visit:    1. Type 2 diabetes mellitus with hyperglycemia, without long-term current use of insulin (HCC) (Primary)  -     aspirin (aspirin) 81 MG EC tablet; Take 1 tablet by mouth Daily.  Dispense: 100 tablet; Refill: 1  -     CBC Auto Differential  -     Comprehensive Metabolic Panel  -     Hemoglobin A1c  -     Lipid Panel  -     MicroAlbumin, Urine, Random - Urine, Clean Catch    2. Primary hypertension  -     TSH+Free T4    3. Anxiety and depression  -     venlafaxine XR (EFFEXOR-XR) 150 MG 24 hr capsule; Take 1 capsule by mouth Daily.  Dispense: 30 capsule; Refill: 5    4. Tobacco abuse  -      CT Chest Low Dose Cancer Screening WO; Future    5. Personal history of nicotine dependence   -      CT Chest Low Dose Cancer Screening WO; Future    6. Need for pneumococcal vaccine  -     Pneumococcal 20-Ce Conj Vacc (PREVNAR-20) 0.5 ML suspension prefilled syringe vaccine; Inject 0.5 mL  into the appropriate muscle as directed by prescriber 1 (One) Time for 1 dose.  Dispense: 0.5 mL; Refill: 0    7. Medicare annual wellness visit, subsequent  -     Hepatitis C Antibody        Follow Up:   Return in about 6 months (around 12/9/2022) for Recheck.     An After Visit Summary and PPPS were made available to the patient.

## 2022-06-13 NOTE — PROGRESS NOTES
Labs show no significant abnormalities except for elevated liver enzymes.  Follow-up in 1-2 weeks to discuss.

## 2022-06-14 DIAGNOSIS — F32.A ANXIETY AND DEPRESSION: ICD-10-CM

## 2022-06-14 DIAGNOSIS — E11.65 TYPE 2 DIABETES MELLITUS WITH HYPERGLYCEMIA, WITHOUT LONG-TERM CURRENT USE OF INSULIN: ICD-10-CM

## 2022-06-14 DIAGNOSIS — I10 PRIMARY HYPERTENSION: ICD-10-CM

## 2022-06-14 DIAGNOSIS — E78.2 MIXED HYPERLIPIDEMIA: ICD-10-CM

## 2022-06-14 DIAGNOSIS — G47.00 INSOMNIA, UNSPECIFIED TYPE: ICD-10-CM

## 2022-06-14 DIAGNOSIS — F41.9 ANXIETY AND DEPRESSION: ICD-10-CM

## 2022-06-14 RX ORDER — FENOFIBRATE 145 MG/1
145 TABLET, COATED ORAL
Qty: 90 TABLET | Refills: 1 | OUTPATIENT
Start: 2022-06-14

## 2022-06-14 RX ORDER — HYDROCHLOROTHIAZIDE 25 MG/1
25 TABLET ORAL DAILY
Qty: 90 TABLET | Refills: 1 | OUTPATIENT
Start: 2022-06-14

## 2022-06-14 RX ORDER — ATORVASTATIN CALCIUM 40 MG/1
40 TABLET, FILM COATED ORAL DAILY
Qty: 90 TABLET | Refills: 1 | OUTPATIENT
Start: 2022-06-14

## 2022-06-14 RX ORDER — LOSARTAN POTASSIUM 100 MG/1
TABLET ORAL
Qty: 90 TABLET | Refills: 0 | OUTPATIENT
Start: 2022-06-14

## 2022-06-14 RX ORDER — VENLAFAXINE HYDROCHLORIDE 75 MG/1
75 CAPSULE, EXTENDED RELEASE ORAL DAILY
Qty: 90 CAPSULE | Refills: 1 | OUTPATIENT
Start: 2022-06-14

## 2022-06-14 RX ORDER — TRAZODONE HYDROCHLORIDE 50 MG/1
50 TABLET ORAL NIGHTLY
Qty: 90 TABLET | Refills: 1 | OUTPATIENT
Start: 2022-06-14

## 2022-06-14 RX ORDER — LOSARTAN POTASSIUM 50 MG/1
50 TABLET ORAL DAILY
Qty: 90 TABLET | Refills: 1 | OUTPATIENT
Start: 2022-06-14

## 2022-06-21 ENCOUNTER — HOSPITAL ENCOUNTER (OUTPATIENT)
Dept: ULTRASOUND IMAGING | Facility: HOSPITAL | Age: 52
Discharge: HOME | End: 2022-06-21

## 2022-06-21 ENCOUNTER — HOSPITAL ENCOUNTER (OUTPATIENT)
Dept: CT IMAGING | Facility: HOSPITAL | Age: 52
Discharge: HOME | End: 2022-06-21

## 2022-06-21 DIAGNOSIS — Z72.0 TOBACCO ABUSE: Primary | ICD-10-CM

## 2022-06-21 DIAGNOSIS — Z72.0 TOBACCO ABUSE: ICD-10-CM

## 2022-06-21 DIAGNOSIS — Z87.891 PERSONAL HISTORY OF NICOTINE DEPENDENCE: ICD-10-CM

## 2022-06-21 DIAGNOSIS — R11.0 CHRONIC NAUSEA: ICD-10-CM

## 2022-06-21 DIAGNOSIS — R11.2 NAUSEA AND VOMITING, UNSPECIFIED VOMITING TYPE: ICD-10-CM

## 2022-06-21 PROCEDURE — 76705 ECHO EXAM OF ABDOMEN: CPT

## 2022-06-21 PROCEDURE — 71271 CT THORAX LUNG CANCER SCR C-: CPT

## 2022-06-21 NOTE — PROGRESS NOTES
CT chest showed no suspicious lesions- recheck in 1 year.  Ultrasound showed mildly enlarged fatty liver.  Really watch diet and exercise and follow-up with gastroenterology for this.  Follow-up if you have any questions.

## 2022-06-23 ENCOUNTER — OFFICE VISIT (OUTPATIENT)
Dept: FAMILY MEDICINE CLINIC | Facility: CLINIC | Age: 52
End: 2022-06-23

## 2022-06-23 VITALS
OXYGEN SATURATION: 98 % | SYSTOLIC BLOOD PRESSURE: 120 MMHG | BODY MASS INDEX: 30.56 KG/M2 | TEMPERATURE: 96.9 F | DIASTOLIC BLOOD PRESSURE: 80 MMHG | WEIGHT: 213 LBS | HEART RATE: 87 BPM

## 2022-06-23 DIAGNOSIS — R74.8 ELEVATED LIVER ENZYMES: Primary | ICD-10-CM

## 2022-06-23 DIAGNOSIS — R42 DIZZINESS: ICD-10-CM

## 2022-06-23 DIAGNOSIS — K76.0 FATTY LIVER: ICD-10-CM

## 2022-06-23 PROCEDURE — 99213 OFFICE O/P EST LOW 20 MIN: CPT | Performed by: FAMILY MEDICINE

## 2022-06-23 NOTE — PROGRESS NOTES
Chief Complaint  Liver Eval (FOLLOW UP ON LIVER TESTS)    Subjective          Robby Hernandes presents to [facility]  Discussed labs and U/S of abdomen- pt has fatty liver  Counseled on diet and exercise- gave written diet      Objective   Allergies   Allergen Reactions   • Lyrica [Pregabalin] Hallucinations   • Oxycodone-Acetaminophen Itching   • Tree Extract Other (See Comments)     Runny nose, sneezing, congestion     Immunization History   Administered Date(s) Administered   • Flu Vaccine Split Quad 09/25/2017, 10/05/2017, 09/03/2018, 12/05/2019   • FluLaval/Fluarix/Fluzone >6 12/07/2021   • Hepatitis A 04/11/2019   • Influenza Quad Vaccine (Inpatient) 08/10/2016   • Influenza, Unspecified 11/02/2015   • Pneumococcal Polysaccharide (PPSV23) 08/10/2016, 12/05/2019   • Tdap 07/28/2017, 01/07/2019     Past Medical History:   Diagnosis Date   • Anxiety and depression    • Asthma    • Chronic knee pain     RIGHT   • Complex regional pain syndrome     LEFT ANKLE   • COPD (chronic obstructive pulmonary disease) (HCC)    • Diabetes mellitus (HCC)    • Fracture of nasal septum 1987    CAN NOT BREATH OUT OF RIGHT NOSTRIL   • Hiatal hernia    • High cholesterol    • Hip pain, chronic, left    • History of heart attack     PER EKG DR MILIAN   • History of kidney stones    • [place] (hard of hearing)    • Hypertension    • Insomnia    • Low back pain    • Shoulder pain, left    • Sleep apnea     DOES NOT USE MACHINE   • Tinnitus       Past Surgical History:   Procedure Laterality Date   • ANKLE FUSION Left    • CARPAL TUNNEL RELEASE Right    • CYSTOSCOPY BLADDER STONE LITHOTRIPSY     • SPINAL CORD STIMULATOR IMPLANT      battery is on the right   • TOTAL HIP ARTHROPLASTY Right    • TOTAL HIP ARTHROPLASTY Left 2/21/2018    Procedure: LT TOTAL HIP ARTHROPLASTY ANTERIOR WITH HANA TABLE;  Surgeon: Dameon Briggs MD;  Location: [facility] MA[facility] OR;  Service:       Social History     Socioeconomic History   •  Marital status: Single   Tobacco Use   • Smoking status: Former Smoker     Packs/day: 2.00     Years: 10.00     Pack years: 20.00     Types: Cigarettes     Start date: 1985     Quit date:      Years since quittin.4   • Smokeless tobacco: Never Used   Vaping Use   • Vaping Use: Never used   Substance and Sexual Activity   • Alcohol use: Yes     Alcohol/week: 15.0 standard drinks     Types: 15 Cans of beer per week   • Drug use: No   • Sexual activity: Defer        Current Outpatient Medications:   •  aspirin (aspirin) 81 MG EC tablet, Take 1 tablet by mouth Daily., Disp: 100 tablet, Rfl: 1  •  atorvastatin (LIPITOR) 40 MG tablet, Take 1 tablet by mouth Daily., Disp: 90 tablet, Rfl: 1  •  Blood Glucose Monitoring Suppl (FreeStyle Lite) w/Device kit, 1 each Daily., Disp: 1 kit, Rfl: 0  •  dapagliflozin Propanediol (Farxiga) 10 MG tablet, Take 10 mg by mouth Daily., Disp: 30 tablet, Rfl: 5  •  fenofibrate (TRICOR) 145 MG tablet, Take 1 tablet by mouth every night at bedtime., Disp: 90 tablet, Rfl: 1  •  glucose blood (FREESTYLE LITE) test strip, Test BG daily, Disp: 100 each, Rfl: 3  •  hydroCHLOROthiazide (HYDRODIURIL) 25 MG tablet, Take 1 tablet by mouth Daily., Disp: 90 tablet, Rfl: 1  •  Lancets (freestyle) lancets, Test BG daily, Disp: 100 each, Rfl: 3  •  losartan (COZAAR) 100 MG tablet, TAKE 1 TABLET(100 MG) BY MOUTH EVERY DAY, Disp: 90 tablet, Rfl: 0  •  metFORMIN (GLUCOPHAGE) 500 MG tablet, Take 2 tablets by mouth 2 (Two) Times a Day With Meals., Disp: 360 tablet, Rfl: 1  •  ondansetron (Zofran) 4 MG tablet, Take 1 tablet by mouth Every 8 (Eight) Hours As Needed for Nausea or Vomiting., Disp: 90 tablet, Rfl: 0  •  ondansetron ODT (ZOFRAN-ODT) 4 MG disintegrating tablet, Place 1 tablet on the tongue Every 6 (Six) Hours As Needed for Nausea or Vomiting., Disp: 15 tablet, Rfl: 0  •  pantoprazole (Protonix) 40 MG EC tablet, Take 1 tablet by mouth Daily., Disp: 30 tablet, Rfl: 1  •  sennosides-docusate  sodium (SENOKOT-S) 8.6-50 MG tablet, Take 2 tablets by mouth 2 (Two) Times a Day., Disp: 60 tablet, Rfl: 1  •  SITagliptin (Januvia) 100 MG tablet, Take 1 tablet by mouth Daily., Disp: 90 tablet, Rfl: 1  •  sucralfate (Carafate) 1 g tablet, Take 1 tablet by mouth 4 (Four) Times a Day., Disp: 120 tablet, Rfl: 1  •  traZODone (DESYREL) 50 MG tablet, Take 1 tablet by mouth Every Night., Disp: 90 tablet, Rfl: 1  •  venlafaxine XR (EFFEXOR-XR) 150 MG 24 hr capsule, Take 1 capsule by mouth Daily., Disp: 30 capsule, Rfl: 5   Family History   Problem Relation Age of Onset   • Arthritis Mother    • Malig Hyperthermia Neg Hx           Vital Signs:   Vitals:    06/23/22 1517   BP: 120/80   Pulse: 87   Temp: 96.9 °F (36.1 °C)   SpO2: 98%   Weight: 96.6 kg (213 lb)       Review of Systems   Constitutional: Negative for fatigue and fever.   HENT: Negative for sore throat.    Eyes: Negative for visual disturbance.   Respiratory: Negative for cough, chest tightness, shortness of breath and wheezing.    Cardiovascular: Negative for chest pain, palpitations and leg swelling.   Gastrointestinal: Negative for abdominal pain, diarrhea, nausea and vomiting.   Skin: Negative for rash.   Neurological: Negative for light-headedness and headaches.      Physical Exam  Vitals reviewed.   Constitutional:       Appearance: Normal appearance. He is well-developed.   HENT:      Head: Normocephalic and atraumatic.      Right Ear: External ear normal.      Left Ear: External ear normal.      Mouth/Throat:      Pharynx: No oropharyngeal exudate.   Eyes:      Conjunctiva/sclera: Conjunctivae normal.      Pupils: Pupils are equal, round, and reactive to light.   Cardiovascular:      Rate and Rhythm: Normal rate and regular rhythm.      Pulses: Normal pulses.      Heart sounds: Normal heart sounds. No murmur heard.    No friction rub. No gallop.   Pulmonary:      Effort: Pulmonary effort is normal.      Breath sounds: Normal breath sounds. No wheezing  or rhonchi.   Abdominal:      General: Abdomen is flat. Bowel sounds are normal. There is no distension.      Palpations: Abdomen is soft. There is no mass.      Tenderness: There is no abdominal tenderness. There is no guarding or rebound.      Hernia: No hernia is present.   Musculoskeletal:         General: Normal range of motion.   Skin:     General: Skin is warm and dry.      Capillary Refill: Capillary refill takes less than 2 seconds.   Neurological:      General: No focal deficit present.      Mental Status: He is alert and oriented to person, place, and time.      Cranial Nerves: No cranial nerve deficit.   Psychiatric:         Mood and Affect: Mood and affect normal.         Behavior: Behavior normal.         Thought Content: Thought content normal.         Judgment: Judgment normal.        Result Review :                 Assessment and Plan    Diagnoses and all orders for this visit:    1. Elevated liver enzymes (Primary)  -     Ambulatory Referral to Gastroenterology    2. Fatty liver  -     Ambulatory Referral to Gastroenterology    3. Dizziness  -     Duplex Carotid Ultrasound CAR; Future            Follow Up   Return if symptoms worsen or fail to improve.  Patient was given instructions and counseling regarding his condition or for health maintenance advice. Please see specific information pulled into the AVS if appropriate.

## 2022-06-28 DIAGNOSIS — E78.2 MIXED HYPERLIPIDEMIA: ICD-10-CM

## 2022-06-28 DIAGNOSIS — I10 PRIMARY HYPERTENSION: ICD-10-CM

## 2022-06-28 RX ORDER — ATORVASTATIN CALCIUM 40 MG/1
40 TABLET, FILM COATED ORAL DAILY
Qty: 90 TABLET | Refills: 1 | Status: SHIPPED | OUTPATIENT
Start: 2022-06-28 | End: 2022-11-08 | Stop reason: SDUPTHER

## 2022-06-28 RX ORDER — HYDROCHLOROTHIAZIDE 25 MG/1
25 TABLET ORAL DAILY
Qty: 90 TABLET | Refills: 1 | Status: SHIPPED | OUTPATIENT
Start: 2022-06-28 | End: 2022-11-08 | Stop reason: SDUPTHER

## 2022-07-14 ENCOUNTER — HOSPITAL ENCOUNTER (OUTPATIENT)
Dept: CARDIOLOGY | Facility: HOSPITAL | Age: 52
Discharge: HOME | End: 2022-07-14
Admitting: FAMILY MEDICINE

## 2022-07-14 DIAGNOSIS — R42 DIZZINESS: ICD-10-CM

## 2022-07-14 LAB
BH CV XLRA MEAS LEFT CAROTID BULB EDV: 12 CM/SEC
BH CV XLRA MEAS LEFT CAROTID BULB PSV: 49 CM/SEC
BH CV XLRA MEAS LEFT DIST CCA EDV: 26 CM/SEC
BH CV XLRA MEAS LEFT DIST CCA PSV: 102 CM/SEC
BH CV XLRA MEAS LEFT DIST ICA EDV: 30 CM/SEC
BH CV XLRA MEAS LEFT DIST ICA PSV: 76 CM/SEC
BH CV XLRA MEAS LEFT MID ICA EDV: 37 CM/SEC
BH CV XLRA MEAS LEFT MID ICA PSV: 78 CM/SEC
BH CV XLRA MEAS LEFT PROX CCA EDV: 38 CM/SEC
BH CV XLRA MEAS LEFT PROX CCA PSV: 129 CM/SEC
BH CV XLRA MEAS LEFT PROX ECA EDV: 28 CM/SEC
BH CV XLRA MEAS LEFT PROX ECA PSV: 115 CM/SEC
BH CV XLRA MEAS LEFT PROX ICA EDV: 39 CM/SEC
BH CV XLRA MEAS LEFT PROX ICA PSV: 75 CM/SEC
BH CV XLRA MEAS LEFT VERTEBRAL A EDV: 22 CM/SEC
BH CV XLRA MEAS LEFT VERTEBRAL A PSV: 62 CM/SEC
BH CV XLRA MEAS RIGHT CAROTID BULB EDV: 21 CM/SEC
BH CV XLRA MEAS RIGHT CAROTID BULB PSV: 60 CM/SEC
BH CV XLRA MEAS RIGHT DIST CCA EDV: 28 CM/SEC
BH CV XLRA MEAS RIGHT DIST CCA PSV: 85 CM/SEC
BH CV XLRA MEAS RIGHT DIST ICA EDV: 32 CM/SEC
BH CV XLRA MEAS RIGHT DIST ICA PSV: 83 CM/SEC
BH CV XLRA MEAS RIGHT MID ICA EDV: 27 CM/SEC
BH CV XLRA MEAS RIGHT MID ICA PSV: 91 CM/SEC
BH CV XLRA MEAS RIGHT PROX CCA EDV: 27 CM/SEC
BH CV XLRA MEAS RIGHT PROX CCA PSV: 78 CM/SEC
BH CV XLRA MEAS RIGHT PROX ECA EDV: 21 CM/SEC
BH CV XLRA MEAS RIGHT PROX ECA PSV: 109 CM/SEC
BH CV XLRA MEAS RIGHT PROX ICA EDV: 39 CM/SEC
BH CV XLRA MEAS RIGHT PROX ICA PSV: 109 CM/SEC
BH CV XLRA MEAS RIGHT VERTEBRAL A EDV: 17 CM/SEC
BH CV XLRA MEAS RIGHT VERTEBRAL A PSV: 40 CM/SEC
LEFT ARM BP: NORMAL MMHG
MAXIMAL PREDICTED HEART RATE: 167 BPM
RIGHT ARM BP: NORMAL MMHG
STRESS TARGET HR: 142 BPM

## 2022-07-14 PROCEDURE — 93880 EXTRACRANIAL BILAT STUDY: CPT

## 2022-07-14 PROCEDURE — 93880 EXTRACRANIAL BILAT STUDY: CPT | Performed by: SURGERY

## 2022-07-20 ENCOUNTER — OFFICE VISIT (OUTPATIENT)
Dept: GASTROENTEROLOGY | Facility: CLINIC | Age: 52
End: 2022-07-20

## 2022-07-20 VITALS
HEART RATE: 101 BPM | SYSTOLIC BLOOD PRESSURE: 114 MMHG | WEIGHT: 211.6 LBS | BODY MASS INDEX: 29.62 KG/M2 | DIASTOLIC BLOOD PRESSURE: 72 MMHG | HEIGHT: 71 IN

## 2022-07-20 DIAGNOSIS — K62.5 RECTAL BLEEDING: ICD-10-CM

## 2022-07-20 DIAGNOSIS — R12 HEARTBURN: ICD-10-CM

## 2022-07-20 DIAGNOSIS — R74.8 ELEVATED LIVER ENZYMES: Primary | ICD-10-CM

## 2022-07-20 DIAGNOSIS — R63.0 DECREASED APPETITE: ICD-10-CM

## 2022-07-20 DIAGNOSIS — K64.9 HEMORRHOIDS, UNSPECIFIED HEMORRHOID TYPE: ICD-10-CM

## 2022-07-20 DIAGNOSIS — R94.5 ABNORMAL RESULTS OF LIVER FUNCTION STUDIES: ICD-10-CM

## 2022-07-20 DIAGNOSIS — R10.9 RIGHT SIDED ABDOMINAL PAIN: ICD-10-CM

## 2022-07-20 PROCEDURE — 99214 OFFICE O/P EST MOD 30 MIN: CPT | Performed by: NURSE PRACTITIONER

## 2022-07-20 RX ORDER — HYDROCORTISONE 25 MG/G
CREAM TOPICAL 2 TIMES DAILY
Qty: 1 EACH | Refills: 2 | Status: SHIPPED | OUTPATIENT
Start: 2022-07-20 | End: 2022-08-03

## 2022-07-20 RX ORDER — POLYETHYLENE GLYCOL 3350 17 G/17G
17 POWDER, FOR SOLUTION ORAL DAILY
Qty: 30 EACH | Refills: 5 | Status: SHIPPED | OUTPATIENT
Start: 2022-07-20

## 2022-07-20 NOTE — PROGRESS NOTES
"Patient Name: Robby Hernandes   Visit Date: 07/20/2022   Patient ID: [id number]  Provider: MADISON Navarro    Sex: male  Location:  Location Address:  Location Phone: [id number]  [place] [address] [phone]    YOB: 1970      Primary Care Provider Wei Lauren MD      Referring Provider: Wei Lauren MD        Chief Complaint  Weight Loss, Nausea, Vomiting, Elevated Hepatic Enzymes, and Hepatic Disease (Fatty Liver )    History of Present Illness  Robby Hernandes is a 52 y.o. who presents to [facility] on referral from Wei Lauren MD for a gastroenterology evaluation of Weight Loss, Nausea, Vomiting, Elevated Hepatic Enzymes, and Hepatic Disease (Fatty Liver )     Mr. Hernandes presents today due to elevation in liver enzymes. Previously drank a 6 pack of alcohol a week in the past however reports he rarely drinks alcohol now. Drinking due to body aches/pains. Tried THC but no change with that. Denies hx of blood transfusion, hx of illicit drug use, or unprofessional tattoos.     Occasionally will have heartburn however reports that is rare. Has dealt with nausea/vomiting in the past however no episodes recently. Decreased appetite however feels that is r/t the new diet he was put on consisting of low carb and low sodium. Eating mainly chicken and duck. Right sided abdominal pain does waxs and wanes. Describes the pain to be \"like a pin prick\". No known aggravating or alleviating factors.     Bowel movement once daily. Describes stool to be a #4 on the bristol stool chart. Occasionally will see bright red blood with wiping. Does feel that he has hemorrhoids that flare often especially if he is straining.  Denies any melena.       Labs Result Review Imaging    Past Medical History:   Diagnosis Date   • Anxiety and depression    • Asthma    • Chronic knee pain     RIGHT   • Complex regional pain syndrome     LEFT ANKLE   • " COPD (chronic obstructive pulmonary disease) (HCC)    • Diabetes mellitus (HCC)    • Fracture of nasal septum 1987    CAN NOT BREATH OUT OF RIGHT NOSTRIL   • Hiatal hernia    • High cholesterol    • Hip pain, chronic, left    • History of heart attack     PER EKG DR MILIAN   • History of kidney stones    • [place] (hard of hearing)    • Hypertension    • Insomnia    • Low back pain    • Shoulder pain, left    • Sleep apnea     DOES NOT USE MACHINE   • Tinnitus        Past Surgical History:   Procedure Laterality Date   • ANKLE FUSION Left    • CARPAL TUNNEL RELEASE Right    • COLONOSCOPY      Cologaurd   • CYSTOSCOPY BLADDER STONE LITHOTRIPSY     • SPINAL CORD STIMULATOR IMPLANT      battery is on the right   • TOTAL HIP ARTHROPLASTY Right    • TOTAL HIP ARTHROPLASTY Left 02/21/2018    Procedure: LT TOTAL HIP ARTHROPLASTY ANTERIOR WITH HANA TABLE;  Surgeon: Dameon Briggs MD;  Location: [facility];  Service:          Current Outpatient Medications:   •  aspirin (aspirin) 81 MG EC tablet, Take 1 tablet by mouth Daily., Disp: 100 tablet, Rfl: 1  •  atorvastatin (LIPITOR) 40 MG tablet, TAKE 1 TABLET BY MOUTH DAILY, Disp: 90 tablet, Rfl: 1  •  Blood Glucose Monitoring Suppl (FreeStyle Lite) w/Device kit, 1 each Daily., Disp: 1 kit, Rfl: 0  •  dapagliflozin Propanediol (Farxiga) 10 MG tablet, Take 10 mg by mouth Daily., Disp: 30 tablet, Rfl: 5  •  fenofibrate (TRICOR) 145 MG tablet, Take 1 tablet by mouth every night at bedtime., Disp: 90 tablet, Rfl: 1  •  glucose blood (FREESTYLE LITE) test strip, Test BG daily, Disp: 100 each, Rfl: 3  •  hydroCHLOROthiazide (HYDRODIURIL) 25 MG tablet, TAKE 1 TABLET BY MOUTH DAILY, Disp: 90 tablet, Rfl: 1  •  Lancets (freestyle) lancets, Test BG daily, Disp: 100 each, Rfl: 3  •  losartan (COZAAR) 100 MG tablet, TAKE 1 TABLET(100 MG) BY MOUTH EVERY DAY, Disp: 90 tablet, Rfl: 0  •  metFORMIN (GLUCOPHAGE) 500 MG tablet, Take 2 tablets by mouth 2 (Two) Times a Day With Meals., Disp: 360  "tablet, Rfl: 1  •  sennosides-docusate sodium (SENOKOT-S) 8.6-50 MG tablet, Take 2 tablets by mouth 2 (Two) Times a Day., Disp: 60 tablet, Rfl: 1  •  SITagliptin (Januvia) 100 MG tablet, Take 1 tablet by mouth Daily., Disp: 90 tablet, Rfl: 1  •  venlafaxine XR (EFFEXOR-XR) 150 MG 24 hr capsule, Take 1 capsule by mouth Daily., Disp: 30 capsule, Rfl: 5  •  Hydrocortisone, Perianal, (ANUSOL-HC) 2.5 % rectal cream, Insert  into the rectum 2 (Two) Times a Day for 14 days., Disp: 1 each, Rfl: 2  •  polyethylene glycol (MIRALAX) 17 g packet, Take 17 g by mouth Daily., Disp: 30 each, Rfl: 5     Allergies   Allergen Reactions   • Lyrica [Pregabalin] Hallucinations   • Oxycodone-Acetaminophen Itching   • Tree Extract Other (See Comments)     Runny nose, sneezing, congestion       Family History   Problem Relation Age of Onset   • Arthritis Mother    • [place] Hyperthermia Neg Hx         Social History     Social History Narrative   • Not on file         Objective     Review of Systems   Constitutional: Positive for appetite change.   Gastrointestinal: Positive for anal bleeding and indigestion.        Vital Signs:   /72 (BP Location: Left arm, Patient Position: Sitting, Cuff Size: Large Adult)   Pulse 101   Ht 180.3 cm (71\")   Wt 96 kg (211 lb 9.6 oz)   BMI 29.51 kg/m²       Physical Exam  Constitutional:       General: He is not in acute distress.     Appearance: Normal appearance. He is well-developed and normal weight.   HENT:      Head: Normocephalic and atraumatic.   Eyes:      Conjunctiva/sclera: Conjunctivae normal.      Pupils: Pupils are equal, round, and reactive to light.      Visual Fields: Right eye visual fields normal and left eye visual fields normal.   Cardiovascular:      Rate and Rhythm: Normal rate and regular rhythm.      Heart sounds: Normal heart sounds.   Pulmonary:      Effort: Pulmonary effort is normal. No retractions.      Breath sounds: Normal breath sounds and air entry.   Abdominal:      " General: Bowel sounds are normal. There is no distension.      Palpations: Abdomen is soft.      Tenderness: There is no abdominal tenderness.      Comments: No appreciable hepatosplenomegaly or ascites   Musculoskeletal:         General: Normal range of motion.      Cervical back: Normal range of motion and neck supple.   Skin:     General: Skin is warm and dry.   Neurological:      Mental Status: He is alert and oriented to person, place, and time.   Psychiatric:         Mood and Affect: Mood and affect normal.         Behavior: Behavior normal.         Result Review :   The following data was reviewed by: MADISON Navarro on 07/20/2022:  CBC w/diff    CBC w/Diff 6/2/22 6/4/22 6/9/22   WBC 3.11 (A) 4.28 6.61   RBC 5.43 6.10 (A) 5.48   Hemoglobin 16.3 18.1 (A) 16.2   Hematocrit 48.2 51.5 (A) 48.1   MCV 88.8 84.4 87.8   MCH 30.0 29.7 29.6   MCHC 33.8 35.1 33.7   RDW 12.4 12.1 (A) 12.6   Platelets 219 234 222   Neutrophil Rel % 58.6 68.2 59.6   Immature Granulocyte Rel % 0.6 (A) 0.5 0.6 (A)   Lymphocyte Rel % 33.1 20.8 26.6   Monocyte Rel % 7.4 9.8 11.5   Eosinophil Rel % 0.0 (A) 0.2 (A) 1.2   Basophil Rel % 0.3 0.5 0.5   (A) Abnormal value            CMP    CMP 6/2/22 6/4/22 6/9/22   Glucose 134 (A) 180 (A) 112 (A)   BUN 12 20 14   Creatinine 0.94 1.20 0.87   Sodium 137 132 (A) 136   Potassium 3.2 (A) 2.9 (A) 3.6   Chloride 93 (A) 88 (A) 95 (A)   Calcium 9.5 10.6 (A) 10.1   Albumin 4.50 5.30 (A) 5.10   Total Bilirubin 0.4 0.7 0.8   Alkaline Phosphatase 40 51 50   AST (SGOT) 39 52 (A) 45 (A)   ALT (SGPT) 26 39 52 (A)   (A) Abnormal value            Lipase   Date Value Ref Range Status   06/04/2022 45 13 - 60 U/L Final     Amylase   Date Value Ref Range Status   06/02/2022 134 (H) 28 - 100 U/L Final     Hepatitis C Ab   Date Value Ref Range Status   06/09/2022 Non-Reactive Non-Reactive Final        CT head abdomen pelvis with contrast 6/4/2022: Normal     Assessment and Plan    Diagnoses and all orders for this  visit:    1. Elevated liver enzymes (Primary)  -     Iron Profile; Future  -     Ferritin; Future  -     ADRIANO; Future  -     Mitochondrial Antibodies, M2; Future  -     Anti-Smooth Muscle Antibody Titer; Future  -     Immunofixation, Serum; Future  -     Alpha - 1 - Antitrypsin; Future  -     Protime-INR; Future  -     Hepatitis Panel, Acute; Future  -     RICHEY Fibrosure; Future  -     Comprehensive Metabolic Panel; Future  -     CBC & Differential; Future  -     US Liver; Future    2. Abnormal results of liver function studies   -     Protime-INR; Future  -     Hepatitis Panel, Acute; Future    3. Rectal bleeding    4. Right sided abdominal pain    5. Heartburn    6. Decreased appetite    7. Hemorrhoids, unspecified hemorrhoid type    Other orders  -     Hydrocortisone, Perianal, (ANUSOL-HC) 2.5 % rectal cream; Insert  into the rectum 2 (Two) Times a Day for 14 days.  Dispense: 1 each; Refill: 2  -     polyethylene glycol (MIRALAX) 17 g packet; Take 17 g by mouth Daily.  Dispense: 30 each; Refill: 5      * Surgery not found *       Patient expresses he is unable to get scopes at this time due to low income.  Educated patient that I am unable to rule out any harmful or or infectious etiology without scopes including but not limited to malignancy.  Patient states he understands.    Follow Up   Return in about 3 months (around 10/20/2022).  Patient was given instructions and counseling regarding his condition or for health maintenance advice. Please see specific information pulled into the AVS if appropriate.

## 2022-07-25 DIAGNOSIS — E11.65 TYPE 2 DIABETES MELLITUS WITH HYPERGLYCEMIA, WITHOUT LONG-TERM CURRENT USE OF INSULIN: ICD-10-CM

## 2022-08-05 DIAGNOSIS — E78.2 MIXED HYPERLIPIDEMIA: ICD-10-CM

## 2022-08-05 DIAGNOSIS — R11.2 NAUSEA AND VOMITING, UNSPECIFIED VOMITING TYPE: ICD-10-CM

## 2022-08-05 RX ORDER — PANTOPRAZOLE SODIUM 40 MG/1
40 TABLET, DELAYED RELEASE ORAL DAILY
Qty: 30 TABLET | Refills: 1 | Status: SHIPPED | OUTPATIENT
Start: 2022-08-05 | End: 2022-11-08 | Stop reason: SDUPTHER

## 2022-08-05 RX ORDER — FENOFIBRATE 145 MG/1
145 TABLET, COATED ORAL
Qty: 90 TABLET | Refills: 1 | Status: SHIPPED | OUTPATIENT
Start: 2022-08-05 | End: 2022-11-08 | Stop reason: SDUPTHER

## 2022-08-09 ENCOUNTER — HOSPITAL ENCOUNTER (OUTPATIENT)
Dept: ULTRASOUND IMAGING | Facility: HOSPITAL | Age: 52
Discharge: HOME | End: 2022-08-09
Admitting: NURSE PRACTITIONER

## 2022-08-09 DIAGNOSIS — R74.8 ELEVATED LIVER ENZYMES: ICD-10-CM

## 2022-08-09 PROCEDURE — 76705 ECHO EXAM OF ABDOMEN: CPT

## 2022-08-16 ENCOUNTER — TELEPHONE (OUTPATIENT)
Dept: GASTROENTEROLOGY | Facility: CLINIC | Age: 52
End: 2022-08-16

## 2022-08-16 NOTE — TELEPHONE ENCOUNTER
----- Message from MADISON Navarro sent at 8/10/2022 11:23 AM EDT -----  Mild fatty liver and enlargement of liver noted on ultrasound.  We will further evaluate extent of this when patient completes liver work-up profile/labs.

## 2022-08-17 NOTE — TELEPHONE ENCOUNTER
Patient was given test results and was advised to get labs. Patient was informed that he could go to [place] to get those drawn.

## 2022-08-22 ENCOUNTER — TELEPHONE (OUTPATIENT)
Dept: GASTROENTEROLOGY | Facility: CLINIC | Age: 52
End: 2022-08-22

## 2022-08-24 ENCOUNTER — CLINICAL SUPPORT (OUTPATIENT)
Dept: FAMILY MEDICINE CLINIC | Facility: CLINIC | Age: 52
End: 2022-08-24

## 2022-08-24 DIAGNOSIS — R74.8 ELEVATED LIVER ENZYMES: ICD-10-CM

## 2022-08-24 DIAGNOSIS — R94.5 ABNORMAL RESULTS OF LIVER FUNCTION STUDIES: ICD-10-CM

## 2022-08-24 LAB
ALBUMIN SERPL-MCNC: 5 G/DL (ref 3.5–5.2)
ALBUMIN/GLOB SERPL: 1.6 G/DL
ALP SERPL-CCNC: 39 U/L (ref 39–117)
ALPHA1 GLOB MFR UR ELPH: 145 MG/DL (ref 90–200)
ALT SERPL W P-5'-P-CCNC: 20 U/L (ref 1–41)
ANION GAP SERPL CALCULATED.3IONS-SCNC: 14.5 MMOL/L (ref 5–15)
AST SERPL-CCNC: 21 U/L (ref 1–40)
BASOPHILS # BLD AUTO: 0.08 10*3/MM3 (ref 0–0.2)
BASOPHILS NFR BLD AUTO: 1 % (ref 0–1.5)
BILIRUB SERPL-MCNC: 0.5 MG/DL (ref 0–1.2)
BUN SERPL-MCNC: 28 MG/DL (ref 6–20)
BUN/CREAT SERPL: 29.8 (ref 7–25)
CALCIUM SPEC-SCNC: 9.8 MG/DL (ref 8.6–10.5)
CHLORIDE SERPL-SCNC: 99 MMOL/L (ref 98–107)
CO2 SERPL-SCNC: 26.5 MMOL/L (ref 22–29)
CREAT SERPL-MCNC: 0.94 MG/DL (ref 0.76–1.27)
DEPRECATED RDW RBC AUTO: 41.9 FL (ref 37–54)
EGFRCR SERPLBLD CKD-EPI 2021: 96.9 ML/MIN/1.73
EOSINOPHIL # BLD AUTO: 0.24 10*3/MM3 (ref 0–0.4)
EOSINOPHIL NFR BLD AUTO: 2.9 % (ref 0.3–6.2)
ERYTHROCYTE [DISTWIDTH] IN BLOOD BY AUTOMATED COUNT: 12.7 % (ref 12.3–15.4)
FERRITIN SERPL-MCNC: 397 NG/ML (ref 30–400)
GLOBULIN UR ELPH-MCNC: 3.1 GM/DL
GLUCOSE SERPL-MCNC: 136 MG/DL (ref 65–99)
HAV IGM SERPL QL IA: NORMAL
HBV CORE IGM SERPL QL IA: NORMAL
HBV SURFACE AG SERPL QL IA: NORMAL
HCT VFR BLD AUTO: 48.9 % (ref 37.5–51)
HCV AB SER DONR QL: NORMAL
HGB BLD-MCNC: 16.1 G/DL (ref 13–17.7)
IMM GRANULOCYTES # BLD AUTO: 0.05 10*3/MM3 (ref 0–0.05)
IMM GRANULOCYTES NFR BLD AUTO: 0.6 % (ref 0–0.5)
INR PPP: 0.79 (ref 0.86–1.15)
IRON 24H UR-MRATE: 130 MCG/DL (ref 59–158)
IRON SATN MFR SERPL: 27 % (ref 20–50)
LYMPHOCYTES # BLD AUTO: 2.11 10*3/MM3 (ref 0.7–3.1)
LYMPHOCYTES NFR BLD AUTO: 25.7 % (ref 19.6–45.3)
MCH RBC QN AUTO: 30.1 PG (ref 26.6–33)
MCHC RBC AUTO-ENTMCNC: 32.9 G/DL (ref 31.5–35.7)
MCV RBC AUTO: 91.4 FL (ref 79–97)
MONOCYTES # BLD AUTO: 0.55 10*3/MM3 (ref 0.1–0.9)
MONOCYTES NFR BLD AUTO: 6.7 % (ref 5–12)
NEUTROPHILS NFR BLD AUTO: 5.18 10*3/MM3 (ref 1.7–7)
NEUTROPHILS NFR BLD AUTO: 63.1 % (ref 42.7–76)
NRBC BLD AUTO-RTO: 0 /100 WBC (ref 0–0.2)
PLATELET # BLD AUTO: 298 10*3/MM3 (ref 140–450)
PMV BLD AUTO: 10.3 FL (ref 6–12)
POTASSIUM SERPL-SCNC: 4.4 MMOL/L (ref 3.5–5.2)
PROT SERPL-MCNC: 8.1 G/DL (ref 6–8.5)
PROTHROMBIN TIME: 11 SECONDS (ref 11.8–14.9)
RBC # BLD AUTO: 5.35 10*6/MM3 (ref 4.14–5.8)
SODIUM SERPL-SCNC: 140 MMOL/L (ref 136–145)
TIBC SERPL-MCNC: 483 MCG/DL (ref 298–536)
TRANSFERRIN SERPL-MCNC: 324 MG/DL (ref 200–360)
WBC NRBC COR # BLD: 8.21 10*3/MM3 (ref 3.4–10.8)

## 2022-08-24 PROCEDURE — 86334 IMMUNOFIX E-PHORESIS SERUM: CPT | Performed by: NURSE PRACTITIONER

## 2022-08-24 PROCEDURE — 80053 COMPREHEN METABOLIC PANEL: CPT | Performed by: NURSE PRACTITIONER

## 2022-08-24 PROCEDURE — 86381 MITOCHONDRIAL ANTIBODY EACH: CPT | Performed by: NURSE PRACTITIONER

## 2022-08-24 PROCEDURE — 83010 ASSAY OF HAPTOGLOBIN QUANT: CPT | Performed by: NURSE PRACTITIONER

## 2022-08-24 PROCEDURE — 82784 ASSAY IGA/IGD/IGG/IGM EACH: CPT | Performed by: NURSE PRACTITIONER

## 2022-08-24 PROCEDURE — 36415 COLL VENOUS BLD VENIPUNCTURE: CPT | Performed by: FAMILY MEDICINE

## 2022-08-24 PROCEDURE — 86038 ANTINUCLEAR ANTIBODIES: CPT | Performed by: NURSE PRACTITIONER

## 2022-08-24 PROCEDURE — 86225 DNA ANTIBODY NATIVE: CPT | Performed by: NURSE PRACTITIONER

## 2022-08-24 PROCEDURE — 86015 ACTIN ANTIBODY EACH: CPT | Performed by: NURSE PRACTITIONER

## 2022-08-24 PROCEDURE — 85025 COMPLETE CBC W/AUTO DIFF WBC: CPT | Performed by: NURSE PRACTITIONER

## 2022-08-24 PROCEDURE — 80074 ACUTE HEPATITIS PANEL: CPT | Performed by: NURSE PRACTITIONER

## 2022-08-24 PROCEDURE — 84466 ASSAY OF TRANSFERRIN: CPT | Performed by: NURSE PRACTITIONER

## 2022-08-24 PROCEDURE — 82172 ASSAY OF APOLIPOPROTEIN: CPT | Performed by: NURSE PRACTITIONER

## 2022-08-24 PROCEDURE — 82103 ALPHA-1-ANTITRYPSIN TOTAL: CPT | Performed by: NURSE PRACTITIONER

## 2022-08-24 PROCEDURE — 82465 ASSAY BLD/SERUM CHOLESTEROL: CPT | Performed by: NURSE PRACTITIONER

## 2022-08-24 PROCEDURE — 83540 ASSAY OF IRON: CPT | Performed by: NURSE PRACTITIONER

## 2022-08-24 PROCEDURE — 84478 ASSAY OF TRIGLYCERIDES: CPT | Performed by: NURSE PRACTITIONER

## 2022-08-24 PROCEDURE — 82728 ASSAY OF FERRITIN: CPT | Performed by: NURSE PRACTITIONER

## 2022-08-24 PROCEDURE — 83883 ASSAY NEPHELOMETRY NOT SPEC: CPT | Performed by: NURSE PRACTITIONER

## 2022-08-24 PROCEDURE — 85610 PROTHROMBIN TIME: CPT | Performed by: NURSE PRACTITIONER

## 2022-08-24 PROCEDURE — 82977 ASSAY OF GGT: CPT | Performed by: NURSE PRACTITIONER

## 2022-08-24 NOTE — PROGRESS NOTES
Venipuncture Blood Specimen Collection  Venipuncture performed in left arm  by Zaida Coello with good hemostasis. Patient tolerated the procedure well without complications.   08/24/22   Zaida Coello

## 2022-08-25 LAB
DSDNA IGG SERPL IA-ACNC: NEGATIVE [IU]/ML
IGA SERPL-MCNC: 216 MG/DL (ref 90–386)
IGG SERPL-MCNC: 1106 MG/DL (ref 603–1613)
IGM SERPL-MCNC: 66 MG/DL (ref 20–172)
MITOCHONDRIA M2 IGG SER-ACNC: 41.3 UNITS (ref 0–20)
NUCLEAR IGG SER IA-RTO: NEGATIVE
PROT PATTERN SERPL IFE-IMP: NORMAL
SMA IGG SER-ACNC: 7 UNITS (ref 0–19)

## 2022-08-26 LAB
A2 MACROGLOB SERPL-MCNC: 128 MG/DL (ref 110–276)
ALT SERPL W P-5'-P-CCNC: 23 IU/L (ref 0–55)
APO A-I SERPL-MCNC: 185 MG/DL (ref 101–178)
AST SERPL W P-5'-P-CCNC: 20 IU/L (ref 0–40)
BILIRUB SERPL-MCNC: 0.3 MG/DL (ref 0–1.2)
CHOLEST SERPL-MCNC: 197 MG/DL (ref 100–199)
FIBROSIS SCORING:: ABNORMAL
FIBROSIS STAGE SERPL QL: ABNORMAL
GGT SERPL-CCNC: 53 IU/L (ref 0–65)
GLUCOSE SERPL-MCNC: 140 MG/DL (ref 65–99)
HAPTOGLOB SERPL-MCNC: 177 MG/DL (ref 29–370)
INTERPRETATIONS: (REFERENCE): ABNORMAL
LABORATORY COMMENT REPORT: ABNORMAL
LIVER FIBR SCORE SERPL CALC.FIBROSURE: 0.06 (ref 0–0.21)
NASH SCORING (REFERENCE): ABNORMAL
NECROINFLAMMATORY ACT GRADE SERPL QL: ABNORMAL
NECROINFLAMMATORY ACT SCORE SERPL: 0.5
SERVICE CMNT-IMP: ABNORMAL
STEATOSIS GRADE (REFERENCE): ABNORMAL
STEATOSIS GRADING (REFERENCE): ABNORMAL
STEATOSIS SCORE (REFERENCE): 0.59 (ref 0–0.3)
TRIGL SERPL-MCNC: 144 MG/DL (ref 0–149)

## 2022-09-01 DIAGNOSIS — R74.8 ELEVATED LIVER ENZYMES: Primary | ICD-10-CM

## 2022-09-07 ENCOUNTER — TELEPHONE (OUTPATIENT)
Dept: GASTROENTEROLOGY | Facility: CLINIC | Age: 52
End: 2022-09-07

## 2022-09-07 NOTE — TELEPHONE ENCOUNTER
----- Message from MADISON Navarro sent at 9/1/2022 10:16 AM EDT -----  Liver work-up is inconsistent as it shows elevation AMA but is not consistent with primary biliary cholangitis.  I am going to repeat liver enzymes in 6 months and continue to monitor.  If liver enzymes elevate then we will need to consider a liver biopsy.    FibroScan shows moderate fatty liver without any fibrosis or damage to the liver

## 2022-09-26 RX ORDER — LOSARTAN POTASSIUM 100 MG/1
TABLET ORAL
Qty: 90 TABLET | Refills: 0 | OUTPATIENT
Start: 2022-09-26

## 2022-10-13 DIAGNOSIS — E11.65 TYPE 2 DIABETES MELLITUS WITH HYPERGLYCEMIA, WITHOUT LONG-TERM CURRENT USE OF INSULIN: ICD-10-CM

## 2022-10-13 DIAGNOSIS — R11.2 NAUSEA AND VOMITING, UNSPECIFIED VOMITING TYPE: ICD-10-CM

## 2022-10-13 RX ORDER — SITAGLIPTIN 100 MG/1
TABLET, FILM COATED ORAL
Qty: 90 TABLET | Refills: 1 | OUTPATIENT
Start: 2022-10-13

## 2022-10-13 RX ORDER — PANTOPRAZOLE SODIUM 40 MG/1
40 TABLET, DELAYED RELEASE ORAL DAILY
Qty: 30 TABLET | Refills: 1 | OUTPATIENT
Start: 2022-10-13

## 2022-10-18 ENCOUNTER — TELEPHONE (OUTPATIENT)
Dept: FAMILY MEDICINE CLINIC | Facility: CLINIC | Age: 52
End: 2022-10-18

## 2022-10-18 NOTE — TELEPHONE ENCOUNTER
Caller: Robby Hernandes    Relationship: Self    Best call back number:[phone]    What is the best time to reach you: ANY    Who are you requesting to speak with (clinical staff, provider,  specific staff member): CLINICAL    What was the call regarding: PATIENT IS WANTING TO CHANGE PHARMACY FROM [facility] TO [facility].     Do you require a callback: YES

## 2022-10-18 NOTE — TELEPHONE ENCOUNTER
Caller: Robby Hernandes    Relationship: Self    Best call back number: [phone]    Who are you requesting to speak with (clinical staff, provider,  specific staff member): MEDICAL STAFF    What was the call regarding: PATIENT IS SWITCHING TO A MAIL DELIVERY PHARMACY. HE IS NOT SURE WHICH ONE IT IS YET. HE WILL CALL BACK WITH THE PHARMACY HE WILL BE USING SO HIS MEDICATIONS CAN BE SWITCHED OVER.

## 2022-10-31 DIAGNOSIS — E11.65 TYPE 2 DIABETES MELLITUS WITH HYPERGLYCEMIA, WITHOUT LONG-TERM CURRENT USE OF INSULIN: ICD-10-CM

## 2022-10-31 RX ORDER — LOSARTAN POTASSIUM 100 MG/1
TABLET ORAL
Qty: 30 TABLET | OUTPATIENT
Start: 2022-10-31

## 2022-10-31 RX ORDER — SITAGLIPTIN 100 MG/1
TABLET, FILM COATED ORAL
Qty: 90 TABLET | Refills: 1 | OUTPATIENT
Start: 2022-10-31

## 2022-11-04 DIAGNOSIS — E11.65 TYPE 2 DIABETES MELLITUS WITH HYPERGLYCEMIA, WITHOUT LONG-TERM CURRENT USE OF INSULIN: ICD-10-CM

## 2022-11-04 NOTE — TELEPHONE ENCOUNTER
Caller: Robby Hernandes    Relationship: Self    Best call back number: [phone]    Requested Prescriptions:   Requested Prescriptions     Pending Prescriptions Disp Refills   • losartan (COZAAR) 100 MG tablet 90 tablet 0   • metFORMIN (GLUCOPHAGE) 500 MG tablet 360 tablet 0     Sig: Take 2 tablets by mouth 2 (Two) Times a Day With Meals.   • SITagliptin (Januvia) 100 MG tablet 90 tablet 1     Sig: Take 1 tablet by mouth Daily.        Pharmacy where request should be sent: [id number] - [place], [place] - [address] AT [facility] - [phone]  - [phone] FX     Additional details provided by patient:      THE PATIENT SAID HE IS OUT OF THE JANUVIA AND LOSARTAN. HE SAID HE HAS BEEN OUT THE JANUVIA FOR A MONTH.  HE SAID HE CHANGED TO THE MAIL ORDER PHARMACY BUT HAS NOT RECEIVED ANY MEDICATION FROM THEM. HE IS REQUESTING THESE MEDICATION TO GO BACK TO [facility]       Does the patient have less than a 3 day supply:  [x] Yes  [] No    La Garcia Rep   11/04/22 14:47 EDT

## 2022-11-05 RX ORDER — LOSARTAN POTASSIUM 100 MG/1
TABLET ORAL
Qty: 90 TABLET | Refills: 0 | OUTPATIENT
Start: 2022-11-05

## 2022-11-08 ENCOUNTER — OFFICE VISIT (OUTPATIENT)
Dept: FAMILY MEDICINE CLINIC | Facility: CLINIC | Age: 52
End: 2022-11-08

## 2022-11-08 VITALS
DIASTOLIC BLOOD PRESSURE: 70 MMHG | OXYGEN SATURATION: 100 % | WEIGHT: 227.6 LBS | BODY MASS INDEX: 31.74 KG/M2 | TEMPERATURE: 97.1 F | SYSTOLIC BLOOD PRESSURE: 128 MMHG | HEART RATE: 87 BPM

## 2022-11-08 DIAGNOSIS — Z79.899 DRUG THERAPY: ICD-10-CM

## 2022-11-08 DIAGNOSIS — E11.65 TYPE 2 DIABETES MELLITUS WITH HYPERGLYCEMIA, WITHOUT LONG-TERM CURRENT USE OF INSULIN: ICD-10-CM

## 2022-11-08 DIAGNOSIS — E78.2 MIXED HYPERLIPIDEMIA: ICD-10-CM

## 2022-11-08 DIAGNOSIS — F32.A ANXIETY AND DEPRESSION: ICD-10-CM

## 2022-11-08 DIAGNOSIS — R11.2 NAUSEA AND VOMITING, UNSPECIFIED VOMITING TYPE: ICD-10-CM

## 2022-11-08 DIAGNOSIS — I10 PRIMARY HYPERTENSION: ICD-10-CM

## 2022-11-08 DIAGNOSIS — F41.9 ANXIETY AND DEPRESSION: ICD-10-CM

## 2022-11-08 DIAGNOSIS — E11.42 DIABETIC PERIPHERAL NEUROPATHY: Primary | ICD-10-CM

## 2022-11-08 LAB
AMPHET+METHAMPHET UR QL: NEGATIVE
BARBITURATES UR QL SCN: NEGATIVE
BENZODIAZ UR QL SCN: NEGATIVE
CANNABINOIDS SERPL QL: NEGATIVE
CHOLEST SERPL-MCNC: 199 MG/DL (ref 0–200)
COCAINE UR QL: NEGATIVE
FOLATE SERPL-MCNC: 5.84 NG/ML (ref 4.78–24.2)
HBA1C MFR BLD: 6.1 % (ref 4.8–5.6)
HDLC SERPL-MCNC: 46 MG/DL (ref 40–60)
LDLC SERPL CALC-MCNC: 129 MG/DL (ref 0–100)
LDLC/HDLC SERPL: 2.73 {RATIO}
METHADONE UR QL SCN: NEGATIVE
OPIATES UR QL: NEGATIVE
OXYCODONE UR QL SCN: NEGATIVE
T4 FREE SERPL-MCNC: 1.25 NG/DL (ref 0.93–1.7)
TRIGL SERPL-MCNC: 136 MG/DL (ref 0–150)
TSH SERPL DL<=0.05 MIU/L-ACNC: 0.83 UIU/ML (ref 0.27–4.2)
VIT B12 BLD-MCNC: 345 PG/ML (ref 211–946)
VLDLC SERPL-MCNC: 24 MG/DL (ref 5–40)

## 2022-11-08 PROCEDURE — 80307 DRUG TEST PRSMV CHEM ANLYZR: CPT | Performed by: FAMILY MEDICINE

## 2022-11-08 PROCEDURE — 82746 ASSAY OF FOLIC ACID SERUM: CPT | Performed by: FAMILY MEDICINE

## 2022-11-08 PROCEDURE — 84439 ASSAY OF FREE THYROXINE: CPT | Performed by: FAMILY MEDICINE

## 2022-11-08 PROCEDURE — 84443 ASSAY THYROID STIM HORMONE: CPT | Performed by: FAMILY MEDICINE

## 2022-11-08 PROCEDURE — 82607 VITAMIN B-12: CPT | Performed by: FAMILY MEDICINE

## 2022-11-08 PROCEDURE — 80061 LIPID PANEL: CPT | Performed by: FAMILY MEDICINE

## 2022-11-08 PROCEDURE — 83036 HEMOGLOBIN GLYCOSYLATED A1C: CPT | Performed by: FAMILY MEDICINE

## 2022-11-08 PROCEDURE — 36415 COLL VENOUS BLD VENIPUNCTURE: CPT | Performed by: FAMILY MEDICINE

## 2022-11-08 PROCEDURE — 99214 OFFICE O/P EST MOD 30 MIN: CPT | Performed by: FAMILY MEDICINE

## 2022-11-08 RX ORDER — GABAPENTIN 100 MG/1
100 CAPSULE ORAL 3 TIMES DAILY PRN
Qty: 90 CAPSULE | Refills: 2 | Status: SHIPPED | OUTPATIENT
Start: 2022-11-08 | End: 2023-03-16

## 2022-11-08 RX ORDER — DAPAGLIFLOZIN 10 MG/1
10 TABLET, FILM COATED ORAL DAILY
Qty: 30 TABLET | Refills: 5 | Status: SHIPPED | OUTPATIENT
Start: 2022-11-08 | End: 2023-03-16 | Stop reason: SDUPTHER

## 2022-11-08 RX ORDER — LOSARTAN POTASSIUM 100 MG/1
100 TABLET ORAL DAILY
Qty: 90 TABLET | Refills: 1 | Status: SHIPPED | OUTPATIENT
Start: 2022-11-08 | End: 2023-03-16 | Stop reason: SDUPTHER

## 2022-11-08 RX ORDER — FENOFIBRATE 145 MG/1
145 TABLET, COATED ORAL
Qty: 90 TABLET | Refills: 1 | Status: SHIPPED | OUTPATIENT
Start: 2022-11-08 | End: 2023-03-16 | Stop reason: SDUPTHER

## 2022-11-08 RX ORDER — HYDROCHLOROTHIAZIDE 25 MG/1
25 TABLET ORAL DAILY
Qty: 90 TABLET | Refills: 1 | Status: SHIPPED | OUTPATIENT
Start: 2022-11-08 | End: 2023-03-16 | Stop reason: SDUPTHER

## 2022-11-08 RX ORDER — ATORVASTATIN CALCIUM 40 MG/1
40 TABLET, FILM COATED ORAL DAILY
Qty: 90 TABLET | Refills: 1 | Status: SHIPPED | OUTPATIENT
Start: 2022-11-08 | End: 2023-03-16 | Stop reason: SDUPTHER

## 2022-11-08 RX ORDER — VENLAFAXINE HYDROCHLORIDE 150 MG/1
150 CAPSULE, EXTENDED RELEASE ORAL DAILY
Qty: 90 CAPSULE | Refills: 1 | Status: SHIPPED | OUTPATIENT
Start: 2022-11-08 | End: 2023-03-16 | Stop reason: SDUPTHER

## 2022-11-08 RX ORDER — PANTOPRAZOLE SODIUM 40 MG/1
40 TABLET, DELAYED RELEASE ORAL DAILY
Qty: 30 TABLET | Refills: 1 | Status: SHIPPED | OUTPATIENT
Start: 2022-11-08 | End: 2022-12-06

## 2022-11-08 NOTE — PROGRESS NOTES
Chief Complaint  Hyperlipidemia, Hypertension, Diabetes, and Anxiety (Refills and labs )    Subjective          Robby Hernandes presents to [facility]  History of Present Illness  Pt having bilateral leg burning and pain in lower legs  Hyperlipidemia  This is a chronic problem. The current episode started more than 1 year ago. The problem is uncontrolled. Recent lipid tests were reviewed and are variable. There are no known factors aggravating his hyperlipidemia. Pertinent negatives include no chest pain, focal sensory loss, focal weakness, leg pain, myalgias or shortness of breath. Current antihyperlipidemic treatment includes statins. The current treatment provides significant improvement of lipids. There are no compliance problems.  Risk factors for coronary artery disease include diabetes mellitus, dyslipidemia, family history, hypertension and male sex.   Hypertension  This is a chronic problem. The current episode started more than 1 year ago. The problem has been gradually improving since onset. The problem is controlled. Pertinent negatives include no blurred vision, chest pain, headaches, malaise/fatigue, neck pain, orthopnea, palpitations, peripheral edema, PND, shortness of breath or sweats. There are no associated agents to hypertension. Current antihypertension treatment includes angiotensin blockers and diuretics. The current treatment provides significant improvement. There are no compliance problems.    Diabetes  He presents for his follow-up diabetic visit. He has type 2 diabetes mellitus. His disease course has been stable. There are no hypoglycemic associated symptoms. Pertinent negatives for hypoglycemia include no confusion, dizziness, headaches, nervousness/anxiousness or sweats. Pertinent negatives for diabetes include no blurred vision, no chest pain, no fatigue, no foot paresthesias, no foot ulcerations, no polydipsia, no polyphagia, no polyuria, no visual  change, no weakness and no weight loss. There are no hypoglycemic complications. Symptoms are stable. Current diabetic treatment includes oral agent (dual therapy). He is compliant with treatment none of the time. He is following a generally healthy diet. He participates in exercise intermittently. An ACE inhibitor/angiotensin II receptor blocker is being taken.   Anxiety  Presents for follow-up visit. Patient reports no chest pain, compulsions, confusion, decreased concentration, depressed mood, dizziness, dry mouth, excessive worry, feeling of choking, hyperventilation, insomnia, irritability, malaise, muscle tension, nausea, nervous/anxious behavior, obsessions, palpitations, panic, restlessness, shortness of breath or suicidal ideas. Symptoms occur occasionally. The severity of symptoms is moderate. The quality of sleep is good. Nighttime awakenings: none.     Compliance with medications is %. Treatment side effects: no side effects.       Objective   Allergies   Allergen Reactions   • Lyrica [Pregabalin] Hallucinations   • Oxycodone-Acetaminophen Itching   • Tree Extract Other (See Comments)     Runny nose, sneezing, congestion     Immunization History   Administered Date(s) Administered   • Flu Vaccine Split Quad 09/25/2017, 10/05/2017, 09/03/2018, 12/05/2019   • FluLaval/Fluzone >6mos 12/07/2021   • Hepatitis A 04/11/2019   • Influenza Quad Vaccine (Inpatient) 08/10/2016   • Influenza, Unspecified 11/02/2015, 10/19/2022   • Pneumococcal Polysaccharide (PPSV23) 08/10/2016, 12/05/2019   • Tdap 07/28/2017, 01/07/2019     Past Medical History:   Diagnosis Date   • Anxiety and depression    • Asthma    • Chronic knee pain     RIGHT   • Complex regional pain syndrome     LEFT ANKLE   • COPD (chronic obstructive pulmonary disease) (HCC)    • Diabetes mellitus (HCC)    • Fracture of nasal septum 1987    CAN NOT BREATH OUT OF RIGHT NOSTRIL   • Hiatal hernia    • High cholesterol    • Hip pain, chronic, left    •  History of heart attack     PER EKG DR MILIAN   • History of kidney stones    • [place] (hard of hearing)    • Hypertension    • Insomnia    • Low back pain    • Shoulder pain, left    • Sleep apnea     DOES NOT USE MACHINE   • Tinnitus       Past Surgical History:   Procedure Laterality Date   • ANKLE FUSION Left    • CARPAL TUNNEL RELEASE Right    • COLONOSCOPY      Cologaurd   • CYSTOSCOPY BLADDER STONE LITHOTRIPSY     • SPINAL CORD STIMULATOR IMPLANT      battery is on the right   • TOTAL HIP ARTHROPLASTY Right    • TOTAL HIP ARTHROPLASTY Left 2018    Procedure: LT TOTAL HIP ARTHROPLASTY ANTERIOR WITH HANA TABLE;  Surgeon: Dameon Briggs MD;  Location: [facility] OR;  Service:       Social History     Socioeconomic History   • Marital status: Single   Tobacco Use   • Smoking status: Former     Packs/day: 2.00     Years: 10.00     Pack years: 20.00     Types: Cigarettes     Start date: 1985     Quit date:      Years since quittin.8   • Smokeless tobacco: Never   Vaping Use   • Vaping Use: Never used   Substance and Sexual Activity   • Alcohol use: Yes     Alcohol/week: 15.0 standard drinks     Types: 15 Cans of beer per week   • Drug use: No   • Sexual activity: Defer        Current Outpatient Medications:   •  aspirin (aspirin) 81 MG EC tablet, Take 1 tablet by mouth Daily., Disp: 100 tablet, Rfl: 1  •  atorvastatin (LIPITOR) 40 MG tablet, Take 1 tablet by mouth Daily., Disp: 90 tablet, Rfl: 1  •  Blood Glucose Monitoring Suppl (FreeStyle Lite) w/Device kit, 1 each Daily., Disp: 1 kit, Rfl: 0  •  dapagliflozin Propanediol (Farxiga) 10 MG tablet, Take 10 mg by mouth Daily., Disp: 30 tablet, Rfl: 5  •  fenofibrate (TRICOR) 145 MG tablet, Take 1 tablet by mouth every night at bedtime., Disp: 90 tablet, Rfl: 1  •  glucose blood (FREESTYLE LITE) test strip, Test BG daily, Disp: 100 each, Rfl: 3  •  hydroCHLOROthiazide (HYDRODIURIL) 25 MG tablet, Take 1 tablet by mouth Daily., Disp: 90 tablet, Rfl:  1  •  Lancets (freestyle) lancets, Test BG daily, Disp: 100 each, Rfl: 3  •  losartan (COZAAR) 100 MG tablet, Take 1 tablet by mouth Daily., Disp: 90 tablet, Rfl: 1  •  metFORMIN (GLUCOPHAGE) 500 MG tablet, Take 2 tablets by mouth 2 (Two) Times a Day With Meals., Disp: 360 tablet, Rfl: 1  •  pantoprazole (PROTONIX) 40 MG EC tablet, Take 1 tablet by mouth Daily., Disp: 30 tablet, Rfl: 1  •  SITagliptin (Januvia) 100 MG tablet, Take 1 tablet by mouth Daily., Disp: 90 tablet, Rfl: 1  •  venlafaxine XR (EFFEXOR-XR) 150 MG 24 hr capsule, Take 1 capsule by mouth Daily., Disp: 90 capsule, Rfl: 1  •  gabapentin (Neurontin) 100 MG capsule, Take 1 capsule by mouth 3 (Three) Times a Day As Needed (pain)., Disp: 90 capsule, Rfl: 2  •  polyethylene glycol (MIRALAX) 17 g packet, Take 17 g by mouth Daily., Disp: 30 each, Rfl: 5  •  sennosides-docusate sodium (SENOKOT-S) 8.6-50 MG tablet, Take 2 tablets by mouth 2 (Two) Times a Day., Disp: 60 tablet, Rfl: 1   Family History   Problem Relation Age of Onset   • Arthritis Mother    • Malig Hyperthermia Neg Hx           Vital Signs:   Vitals:    11/08/22 0903   BP: 128/70   BP Location: Left arm   Patient Position: Sitting   Cuff Size: Adult   Pulse: 87   Temp: 97.1 °F (36.2 °C)   SpO2: 100%   Weight: 103 kg (227 lb 9.6 oz)       Review of Systems   Constitutional: Negative for fatigue, irritability, malaise/fatigue and weight loss.   Eyes: Negative for blurred vision.   Respiratory: Negative for shortness of breath.    Cardiovascular: Negative for chest pain, palpitations, orthopnea and PND.   Gastrointestinal: Negative for nausea.   Endocrine: Negative for polydipsia, polyphagia and polyuria.   Musculoskeletal: Negative for myalgias and neck pain.   Neurological: Negative for dizziness, focal weakness, weakness and headaches.   Psychiatric/Behavioral: Negative for confusion, decreased concentration and suicidal ideas. The patient is not nervous/anxious and does not have insomnia.        Physical Exam  Vitals reviewed.   Constitutional:       Appearance: Normal appearance. He is well-developed.   HENT:      Head: Normocephalic and atraumatic.      Right Ear: External ear normal.      Left Ear: External ear normal.      Mouth/Throat:      Pharynx: No oropharyngeal exudate.   Eyes:      Conjunctiva/sclera: Conjunctivae normal.      Pupils: Pupils are equal, round, and reactive to light.   Cardiovascular:      Rate and Rhythm: Normal rate and regular rhythm.      Pulses: Normal pulses.      Heart sounds: Normal heart sounds. No murmur heard.    No friction rub. No gallop.   Pulmonary:      Effort: Pulmonary effort is normal.      Breath sounds: Normal breath sounds. No wheezing or rhonchi.   Abdominal:      General: Abdomen is flat. Bowel sounds are normal. There is no distension.      Palpations: Abdomen is soft. There is no mass.      Tenderness: There is no abdominal tenderness. There is no guarding or rebound.      Hernia: No hernia is present.   Musculoskeletal:         General: Normal range of motion.      Cervical back: Normal range of motion and neck supple.      Comments: No swelling in either leg, no redness, warmth, or bruising.   Skin:     General: Skin is warm and dry.      Capillary Refill: Capillary refill takes less than 2 seconds.   Neurological:      General: No focal deficit present.      Mental Status: He is alert and oriented to person, place, and time.      Cranial Nerves: No cranial nerve deficit.   Psychiatric:         Mood and Affect: Mood and affect normal.         Behavior: Behavior normal.         Thought Content: Thought content normal.         Judgment: Judgment normal.        Result Review :   The following data was reviewed by: Wei Lauren MD on 11/08/2022:  CMP    CMP 6/4/22 6/9/22 8/24/22   Glucose 180 (A) 112 (A) 136 (A)   BUN 20 14 28 (A)   Creatinine 1.20 0.87 0.94   Sodium 132 (A) 136 140   Potassium 2.9 (A) 3.6 4.4   Chloride 88 (A) 95 (A) 99   Calcium 10.6  (A) 10.1 9.8   Albumin 5.30 (A) 5.10 5.00   Total Bilirubin 0.7 0.8 0.5   Alkaline Phosphatase 51 50 39   AST (SGOT) 52 (A) 45 (A) 21   ALT (SGPT) 39 52 (A) 20   (A) Abnormal value            CBC    CBC 6/4/22 6/9/22 8/24/22   WBC 4.28 6.61 8.21   RBC 6.10 (A) 5.48 5.35   Hemoglobin 18.1 (A) 16.2 16.1   Hematocrit 51.5 (A) 48.1 48.9   MCV 84.4 87.8 91.4   MCH 29.7 29.6 30.1   MCHC 35.1 33.7 32.9   RDW 12.1 (A) 12.6 12.7   Platelets 234 222 298   (A) Abnormal value            Lipid Panel    Lipid Panel 12/7/21 6/9/22 8/24/22   Total Cholesterol 155 118    Triglycerides 303 (A) 180 (A) 144   HDL Cholesterol 31 (A) 34 (A)    VLDL Cholesterol 49 (A) 30    LDL Cholesterol  75 54    LDL/HDL Ratio 2.05 1.41    (A) Abnormal value            TSH    TSH 12/7/21 6/9/22   TSH 1.230 1.780           Most Recent A1C    HGBA1C Most Recent 6/9/22   Hemoglobin A1C 6.70 (A)   (A) Abnormal value            Microalbumin    Microalbumin 12/7/21 6/9/22   Microalbumin, Urine <1.2 <1.2           PSA    PSA 6/9/22   PSA 0.426                     Assessment and Plan    Diagnoses and all orders for this visit:    1. Diabetic peripheral neuropathy (HCC) (Primary)  -     Ambulatory Referral to Neurology  -     gabapentin (Neurontin) 100 MG capsule; Take 1 capsule by mouth 3 (Three) Times a Day As Needed (pain).  Dispense: 90 capsule; Refill: 2  -     Vitamin B12 & Folate    2. Type 2 diabetes mellitus with hyperglycemia, without long-term current use of insulin (HCC)  -     Hemoglobin A1c  -     Lipid Panel  -     dapagliflozin Propanediol (Farxiga) 10 MG tablet; Take 10 mg by mouth Daily.  Dispense: 30 tablet; Refill: 5  -     metFORMIN (GLUCOPHAGE) 500 MG tablet; Take 2 tablets by mouth 2 (Two) Times a Day With Meals.  Dispense: 360 tablet; Refill: 1  -     SITagliptin (Januvia) 100 MG tablet; Take 1 tablet by mouth Daily.  Dispense: 90 tablet; Refill: 1    3. Primary hypertension  -     TSH+Free T4  -     hydroCHLOROthiazide (HYDRODIURIL) 25  MG tablet; Take 1 tablet by mouth Daily.  Dispense: 90 tablet; Refill: 1  -     losartan (COZAAR) 100 MG tablet; Take 1 tablet by mouth Daily.  Dispense: 90 tablet; Refill: 1    4. Drug therapy  -     Urine Drug Screen - Urine, Clean Catch    5. Mixed hyperlipidemia  -     atorvastatin (LIPITOR) 40 MG tablet; Take 1 tablet by mouth Daily.  Dispense: 90 tablet; Refill: 1  -     fenofibrate (TRICOR) 145 MG tablet; Take 1 tablet by mouth every night at bedtime.  Dispense: 90 tablet; Refill: 1    6. Nausea and vomiting, unspecified vomiting type  -     pantoprazole (PROTONIX) 40 MG EC tablet; Take 1 tablet by mouth Daily.  Dispense: 30 tablet; Refill: 1    7. Anxiety and depression  -     venlafaxine XR (EFFEXOR-XR) 150 MG 24 hr capsule; Take 1 capsule by mouth Daily.  Dispense: 90 capsule; Refill: 1            Follow Up   Return in about 3 months (around 2/8/2023) for Recheck.  Patient was given instructions and counseling regarding his condition or for health maintenance advice. Please see specific information pulled into the AVS if appropriate.

## 2022-11-08 NOTE — PROGRESS NOTES
Venipuncture Blood Specimen Collection  Venipuncture performed in left arm by Christina Harris with good hemostasis. Patient tolerated the procedure well without complications.   11/08/22   Christina Harris

## 2022-12-06 DIAGNOSIS — R11.2 NAUSEA AND VOMITING, UNSPECIFIED VOMITING TYPE: ICD-10-CM

## 2022-12-06 RX ORDER — PANTOPRAZOLE SODIUM 40 MG/1
40 TABLET, DELAYED RELEASE ORAL DAILY
Qty: 30 TABLET | Refills: 1 | Status: SHIPPED | OUTPATIENT
Start: 2022-12-06 | End: 2023-03-06

## 2023-01-03 ENCOUNTER — OFFICE VISIT (OUTPATIENT)
Dept: NEUROLOGY | Facility: CLINIC | Age: 53
End: 2023-01-03
Payer: MEDICARE

## 2023-01-03 VITALS
WEIGHT: 223 LBS | DIASTOLIC BLOOD PRESSURE: 78 MMHG | HEIGHT: 71 IN | HEART RATE: 96 BPM | BODY MASS INDEX: 31.22 KG/M2 | SYSTOLIC BLOOD PRESSURE: 151 MMHG

## 2023-01-03 DIAGNOSIS — E08.42 DIABETIC POLYNEUROPATHY ASSOCIATED WITH DIABETES MELLITUS DUE TO UNDERLYING CONDITION: Primary | ICD-10-CM

## 2023-01-03 PROCEDURE — 1159F MED LIST DOCD IN RCRD: CPT | Performed by: PSYCHIATRY & NEUROLOGY

## 2023-01-03 PROCEDURE — 3077F SYST BP >= 140 MM HG: CPT | Performed by: PSYCHIATRY & NEUROLOGY

## 2023-01-03 PROCEDURE — 95910 NRV CNDJ TEST 7-8 STUDIES: CPT | Performed by: PSYCHIATRY & NEUROLOGY

## 2023-01-03 PROCEDURE — 3078F DIAST BP <80 MM HG: CPT | Performed by: PSYCHIATRY & NEUROLOGY

## 2023-01-03 PROCEDURE — 95886 MUSC TEST DONE W/N TEST COMP: CPT | Performed by: PSYCHIATRY & NEUROLOGY

## 2023-01-03 PROCEDURE — 99202 OFFICE O/P NEW SF 15 MIN: CPT | Performed by: PSYCHIATRY & NEUROLOGY

## 2023-01-03 PROCEDURE — 1160F RVW MEDS BY RX/DR IN RCRD: CPT | Performed by: PSYCHIATRY & NEUROLOGY

## 2023-01-03 NOTE — ASSESSMENT & PLAN NOTE
Nerve conduction study is abnormal shows electrophysiologic evidence for axonal and demyelinating sensorimotor polyneuropathy.  EMG study is normal and does not show electrophysiologic evidence for lumbosacral radiculopathy involving the L2-S1 ventral nerve roots on the left side which is the most symptomatic leg.    I would recommend for him to be referred to orthopedic surgery for his hip pain.

## 2023-01-03 NOTE — PROGRESS NOTES
Chief Complaint  No chief complaint on file.    Subjective          Robby Hernandes is a 52 y.o. male who presents to [facility]  History of Present Illness  52-year-old man evaluated for EMG nerve conduction study.  He states that his been having bilateral hip pain since he had surgery to both hips 7 years ago.  Never got any better and that he states that going up and down the steps makes it worse.  He is also complaining of left leg greater than right leg pain.  This has been ongoing for the last 15 years.  He states that if he walks his legs start to hurt him and left side goes all the way down to his knees and hips.  The right leg is confined to the bottom part of his leg and foot.  He is also complaining of back pain.  Has had back pain for over 30 years.  It started in his teens.  He states that his been diagnosed to have complex regional pain syndrome by pain management.  He has a  stimulator.    He has been diabetic for several years.  Objective   Vital Signs:   /78   Pulse 96   Ht 180.3 cm (70.98\")   Wt 101 kg (223 lb)   BMI 31.12 kg/m²     Physical Exam   There is no weakness of the lower extremity on either muscle testing.  Reflexes are decreased in the patellar's and absent in the ankles.        Assessment and Plan  Diagnoses and all orders for this visit:    1. Diabetic polyneuropathy associated with diabetes mellitus due to underlying condition (HCC) (Primary)  Assessment & Plan:  Nerve conduction study is abnormal shows electrophysiologic evidence for axonal and demyelinating sensorimotor polyneuropathy.  EMG study is normal and does not show electrophysiologic evidence for lumbosacral radiculopathy involving the L2-S1 ventral nerve roots on the left side which is the most symptomatic leg.    I would recommend for him to be referred to orthopedic surgery for his hip pain.         Nerve Conduction Study:  7 nerves     EMG:  Complete    Total time spent  with the patient and coordinating patient care was 15 minutes.    Follow Up  No follow-ups on file.  Patient was given instructions and counseling regarding his condition or for health maintenance advice. Please see specific information pulled into the AVS if appropriate.

## 2023-01-21 DIAGNOSIS — E11.65 TYPE 2 DIABETES MELLITUS WITH HYPERGLYCEMIA, WITHOUT LONG-TERM CURRENT USE OF INSULIN: ICD-10-CM

## 2023-01-23 RX ORDER — ASPIRIN 81 MG/1
TABLET, COATED ORAL
Qty: 30 TABLET | Refills: 0 | Status: SHIPPED | OUTPATIENT
Start: 2023-01-23 | End: 2023-03-06

## 2023-03-04 DIAGNOSIS — E11.65 TYPE 2 DIABETES MELLITUS WITH HYPERGLYCEMIA, WITHOUT LONG-TERM CURRENT USE OF INSULIN: ICD-10-CM

## 2023-03-04 DIAGNOSIS — R11.2 NAUSEA AND VOMITING, UNSPECIFIED VOMITING TYPE: ICD-10-CM

## 2023-03-06 ENCOUNTER — TELEPHONE (OUTPATIENT)
Dept: GASTROENTEROLOGY | Facility: CLINIC | Age: 53
End: 2023-03-06
Payer: MEDICARE

## 2023-03-06 RX ORDER — ASPIRIN 81 MG/1
TABLET, COATED ORAL
Qty: 30 TABLET | Refills: 0 | Status: SHIPPED | OUTPATIENT
Start: 2023-03-06

## 2023-03-06 RX ORDER — PANTOPRAZOLE SODIUM 40 MG/1
40 TABLET, DELAYED RELEASE ORAL DAILY
Qty: 30 TABLET | Refills: 1 | Status: SHIPPED | OUTPATIENT
Start: 2023-03-06 | End: 2023-03-16 | Stop reason: SDUPTHER

## 2023-03-06 NOTE — TELEPHONE ENCOUNTER
Attempted to contact patient in regards to overdue results.(CMP) Left voicemail for patient requesting a returned call

## 2023-03-16 ENCOUNTER — OFFICE VISIT (OUTPATIENT)
Dept: FAMILY MEDICINE CLINIC | Facility: CLINIC | Age: 53
End: 2023-03-16
Payer: MEDICARE

## 2023-03-16 VITALS
WEIGHT: 225 LBS | OXYGEN SATURATION: 99 % | TEMPERATURE: 98.2 F | DIASTOLIC BLOOD PRESSURE: 70 MMHG | HEIGHT: 70 IN | SYSTOLIC BLOOD PRESSURE: 125 MMHG | HEART RATE: 80 BPM | BODY MASS INDEX: 32.21 KG/M2

## 2023-03-16 DIAGNOSIS — I10 PRIMARY HYPERTENSION: ICD-10-CM

## 2023-03-16 DIAGNOSIS — M25.532 PAIN IN BOTH WRISTS: ICD-10-CM

## 2023-03-16 DIAGNOSIS — F32.A ANXIETY AND DEPRESSION: ICD-10-CM

## 2023-03-16 DIAGNOSIS — M25.50 MULTIPLE JOINT PAIN: Primary | ICD-10-CM

## 2023-03-16 DIAGNOSIS — M25.531 PAIN IN BOTH WRISTS: ICD-10-CM

## 2023-03-16 DIAGNOSIS — F41.9 ANXIETY AND DEPRESSION: ICD-10-CM

## 2023-03-16 DIAGNOSIS — R11.2 NAUSEA AND VOMITING, UNSPECIFIED VOMITING TYPE: ICD-10-CM

## 2023-03-16 DIAGNOSIS — E11.65 TYPE 2 DIABETES MELLITUS WITH HYPERGLYCEMIA, WITHOUT LONG-TERM CURRENT USE OF INSULIN: ICD-10-CM

## 2023-03-16 DIAGNOSIS — E78.2 MIXED HYPERLIPIDEMIA: ICD-10-CM

## 2023-03-16 LAB
ALBUMIN SERPL-MCNC: 5 G/DL (ref 3.5–5.2)
ALBUMIN/GLOB SERPL: 1.8 G/DL
ALP SERPL-CCNC: 49 U/L (ref 39–117)
ALT SERPL W P-5'-P-CCNC: 23 U/L (ref 1–41)
ANION GAP SERPL CALCULATED.3IONS-SCNC: 12.9 MMOL/L (ref 5–15)
AST SERPL-CCNC: 27 U/L (ref 1–40)
BASOPHILS # BLD AUTO: 0.12 10*3/MM3 (ref 0–0.2)
BASOPHILS NFR BLD AUTO: 1.3 % (ref 0–1.5)
BILIRUB SERPL-MCNC: 0.7 MG/DL (ref 0–1.2)
BUN SERPL-MCNC: 17 MG/DL (ref 6–20)
BUN/CREAT SERPL: 18.5 (ref 7–25)
CALCIUM SPEC-SCNC: 10.2 MG/DL (ref 8.6–10.5)
CHLORIDE SERPL-SCNC: 96 MMOL/L (ref 98–107)
CHROMATIN AB SERPL-ACNC: <10 IU/ML (ref 0–14)
CO2 SERPL-SCNC: 28.1 MMOL/L (ref 22–29)
CREAT SERPL-MCNC: 0.92 MG/DL (ref 0.76–1.27)
DEPRECATED RDW RBC AUTO: 42.3 FL (ref 37–54)
EGFRCR SERPLBLD CKD-EPI 2021: 98.9 ML/MIN/1.73
EOSINOPHIL # BLD AUTO: 0.23 10*3/MM3 (ref 0–0.4)
EOSINOPHIL NFR BLD AUTO: 2.6 % (ref 0.3–6.2)
ERYTHROCYTE [DISTWIDTH] IN BLOOD BY AUTOMATED COUNT: 12.5 % (ref 12.3–15.4)
GLOBULIN UR ELPH-MCNC: 2.8 GM/DL
GLUCOSE SERPL-MCNC: 134 MG/DL (ref 65–99)
HCT VFR BLD AUTO: 50.1 % (ref 37.5–51)
HGB BLD-MCNC: 17.8 G/DL (ref 13–17.7)
IMM GRANULOCYTES # BLD AUTO: 0.11 10*3/MM3 (ref 0–0.05)
IMM GRANULOCYTES NFR BLD AUTO: 1.2 % (ref 0–0.5)
LYMPHOCYTES # BLD AUTO: 1.76 10*3/MM3 (ref 0.7–3.1)
LYMPHOCYTES NFR BLD AUTO: 19.6 % (ref 19.6–45.3)
MCH RBC QN AUTO: 33.3 PG (ref 26.6–33)
MCHC RBC AUTO-ENTMCNC: 35.5 G/DL (ref 31.5–35.7)
MCV RBC AUTO: 93.8 FL (ref 79–97)
MONOCYTES # BLD AUTO: 0.87 10*3/MM3 (ref 0.1–0.9)
MONOCYTES NFR BLD AUTO: 9.7 % (ref 5–12)
NEUTROPHILS NFR BLD AUTO: 5.91 10*3/MM3 (ref 1.7–7)
NEUTROPHILS NFR BLD AUTO: 65.6 % (ref 42.7–76)
NRBC BLD AUTO-RTO: 0.1 /100 WBC (ref 0–0.2)
PLATELET # BLD AUTO: 224 10*3/MM3 (ref 140–450)
PMV BLD AUTO: 10.1 FL (ref 6–12)
POTASSIUM SERPL-SCNC: 4.2 MMOL/L (ref 3.5–5.2)
PROT SERPL-MCNC: 7.8 G/DL (ref 6–8.5)
RBC # BLD AUTO: 5.34 10*6/MM3 (ref 4.14–5.8)
SODIUM SERPL-SCNC: 137 MMOL/L (ref 136–145)
URATE SERPL-MCNC: 4.7 MG/DL (ref 3.4–7)
WBC NRBC COR # BLD: 9 10*3/MM3 (ref 3.4–10.8)

## 2023-03-16 PROCEDURE — 80053 COMPREHEN METABOLIC PANEL: CPT | Performed by: FAMILY MEDICINE

## 2023-03-16 PROCEDURE — 3078F DIAST BP <80 MM HG: CPT | Performed by: FAMILY MEDICINE

## 2023-03-16 PROCEDURE — 86038 ANTINUCLEAR ANTIBODIES: CPT | Performed by: FAMILY MEDICINE

## 2023-03-16 PROCEDURE — 85025 COMPLETE CBC W/AUTO DIFF WBC: CPT | Performed by: FAMILY MEDICINE

## 2023-03-16 PROCEDURE — 86200 CCP ANTIBODY: CPT | Performed by: FAMILY MEDICINE

## 2023-03-16 PROCEDURE — 3074F SYST BP LT 130 MM HG: CPT | Performed by: FAMILY MEDICINE

## 2023-03-16 PROCEDURE — 1160F RVW MEDS BY RX/DR IN RCRD: CPT | Performed by: FAMILY MEDICINE

## 2023-03-16 PROCEDURE — 84550 ASSAY OF BLOOD/URIC ACID: CPT | Performed by: FAMILY MEDICINE

## 2023-03-16 PROCEDURE — 86431 RHEUMATOID FACTOR QUANT: CPT | Performed by: FAMILY MEDICINE

## 2023-03-16 PROCEDURE — 99214 OFFICE O/P EST MOD 30 MIN: CPT | Performed by: FAMILY MEDICINE

## 2023-03-16 RX ORDER — ATORVASTATIN CALCIUM 40 MG/1
40 TABLET, FILM COATED ORAL DAILY
Qty: 90 TABLET | Refills: 0 | Status: SHIPPED | OUTPATIENT
Start: 2023-03-16

## 2023-03-16 RX ORDER — CELECOXIB 200 MG/1
200 CAPSULE ORAL DAILY
Qty: 90 CAPSULE | Refills: 0 | Status: SHIPPED | OUTPATIENT
Start: 2023-03-16

## 2023-03-16 RX ORDER — HYDROCHLOROTHIAZIDE 25 MG/1
25 TABLET ORAL DAILY
Qty: 90 TABLET | Refills: 0 | Status: SHIPPED | OUTPATIENT
Start: 2023-03-16

## 2023-03-16 RX ORDER — LOSARTAN POTASSIUM 100 MG/1
100 TABLET ORAL DAILY
Qty: 90 TABLET | Refills: 0 | Status: SHIPPED | OUTPATIENT
Start: 2023-03-16

## 2023-03-16 RX ORDER — FENOFIBRATE 145 MG/1
145 TABLET, COATED ORAL
Qty: 90 TABLET | Refills: 0 | Status: SHIPPED | OUTPATIENT
Start: 2023-03-16

## 2023-03-16 RX ORDER — PANTOPRAZOLE SODIUM 40 MG/1
40 TABLET, DELAYED RELEASE ORAL DAILY
Qty: 90 TABLET | Refills: 0 | Status: SHIPPED | OUTPATIENT
Start: 2023-03-16

## 2023-03-16 RX ORDER — VENLAFAXINE HYDROCHLORIDE 150 MG/1
150 CAPSULE, EXTENDED RELEASE ORAL DAILY
Qty: 90 CAPSULE | Refills: 0 | Status: SHIPPED | OUTPATIENT
Start: 2023-03-16

## 2023-03-16 RX ORDER — LIDOCAINE 50 MG/G
2 PATCH TOPICAL EVERY 24 HOURS
Qty: 60 PATCH | Refills: 1 | Status: SHIPPED | OUTPATIENT
Start: 2023-03-16

## 2023-03-16 RX ORDER — DAPAGLIFLOZIN 10 MG/1
10 TABLET, FILM COATED ORAL DAILY
Qty: 90 TABLET | Refills: 0 | Status: SHIPPED | OUTPATIENT
Start: 2023-03-16

## 2023-03-16 NOTE — PROGRESS NOTES
Chief Complaint  Wrist Pain (Bilateral wrist pain )    Subjective          Robby Hernandes presents to [facility]  History of Present Illness  Pt has seen neurology- pt had normal EMG- they recommended pt to see ortho for his hip pain if he continues to occur  Pt has now developed bilateral wrist pain x 4 months- sudden onset- symptoms unchanged- pt developed pain a little while after moving a washing machine  Pt is stable in his conditions- need refill for 3 mos due to insurance changes      Objective   Allergies   Allergen Reactions   • Lyrica [Pregabalin] Hallucinations   • Oxycodone-Acetaminophen Itching   • Tree Extract Other (See Comments)     Runny nose, sneezing, congestion     Immunization History   Administered Date(s) Administered   • Flu Vaccine Split Quad 09/25/2017, 10/05/2017, 09/03/2018, 12/05/2019   • FluLaval/Fluzone >6mos 12/07/2021   • Hepatitis A 04/11/2019   • Influenza Quad Vaccine (Inpatient) 08/10/2016   • Influenza, Unspecified 11/02/2015, 10/19/2022   • Pneumococcal Polysaccharide (PPSV23) 08/10/2016, 12/05/2019   • Tdap 07/28/2017, 01/07/2019     Past Medical History:   Diagnosis Date   • Anxiety and depression    • Asthma    • Chronic knee pain     RIGHT   • Complex regional pain syndrome     LEFT ANKLE   • COPD (chronic obstructive pulmonary disease) (HCC)    • Diabetes mellitus (HCC)    • Fracture of nasal septum 1987    CAN NOT BREATH OUT OF RIGHT NOSTRIL   • Hiatal hernia    • High cholesterol    • Hip pain, chronic, left    • History of heart attack     PER EKG DR MILIAN   • History of kidney stones    • [place] (hard of hearing)    • Hypertension    • Insomnia    • Low back pain    • Shoulder pain, left    • Sleep apnea     DOES NOT USE MACHINE   • Tinnitus       Past Surgical History:   Procedure Laterality Date   • ANKLE FUSION Left    • CARPAL TUNNEL RELEASE Right    • COLONOSCOPY      Cologaurd   • CYSTOSCOPY BLADDER STONE LITHOTRIPSY     • SPINAL CORD  STIMULATOR IMPLANT      battery is on the right   • TOTAL HIP ARTHROPLASTY Right    • TOTAL HIP ARTHROPLASTY Left 02/21/2018    Procedure: LT TOTAL HIP ARTHROPLASTY ANTERIOR WITH HANA TABLE;  Surgeon: Dameon Briggs MD;  Location: [facility];  Service:       Social History     Socioeconomic History   • Marital status: Single   Tobacco Use   • Smoking status: Former     Packs/day: 2.00     Years: 10.00     Pack years: 20.00     Types: Cigarettes     Start date: 12/7/1985     Quit date: 2008     Years since quitting: 15.2   • Smokeless tobacco: Never   Vaping Use   • Vaping Use: Never used   Substance and Sexual Activity   • Alcohol use: Yes     Alcohol/week: 15.0 standard drinks     Types: 15 Cans of beer per week   • Drug use: No   • Sexual activity: Defer        Current Outpatient Medications:   •  Aspirin Low Dose 81 MG EC tablet, TAKE 1 TABLET BY MOUTH EVERY DAY, Disp: 30 tablet, Rfl: 0  •  atorvastatin (LIPITOR) 40 MG tablet, Take 1 tablet by mouth Daily., Disp: 90 tablet, Rfl: 0  •  Blood Glucose Monitoring Suppl (FreeStyle Lite) w/Device kit, 1 each Daily., Disp: 1 kit, Rfl: 0  •  dapagliflozin Propanediol (Farxiga) 10 MG tablet, Take 10 mg by mouth Daily., Disp: 90 tablet, Rfl: 0  •  fenofibrate (TRICOR) 145 MG tablet, Take 1 tablet by mouth every night at bedtime., Disp: 90 tablet, Rfl: 0  •  glucose blood (FREESTYLE LITE) test strip, Test BG daily, Disp: 100 each, Rfl: 3  •  hydroCHLOROthiazide (HYDRODIURIL) 25 MG tablet, Take 1 tablet by mouth Daily., Disp: 90 tablet, Rfl: 0  •  Lancets (freestyle) lancets, Test BG daily, Disp: 100 each, Rfl: 3  •  losartan (COZAAR) 100 MG tablet, Take 1 tablet by mouth Daily., Disp: 90 tablet, Rfl: 0  •  metFORMIN (GLUCOPHAGE) 500 MG tablet, Take 2 tablets by mouth 2 (Two) Times a Day With Meals., Disp: 360 tablet, Rfl: 0  •  pantoprazole (PROTONIX) 40 MG EC tablet, Take 1 tablet by mouth Daily., Disp: 90 tablet, Rfl: 0  •  polyethylene glycol (MIRALAX) 17 g packet,  "Take 17 g by mouth Daily., Disp: 30 each, Rfl: 5  •  sennosides-docusate sodium (SENOKOT-S) 8.6-50 MG tablet, Take 2 tablets by mouth 2 (Two) Times a Day., Disp: 60 tablet, Rfl: 1  •  SITagliptin (Januvia) 100 MG tablet, Take 1 tablet by mouth Daily., Disp: 90 tablet, Rfl: 0  •  venlafaxine XR (EFFEXOR-XR) 150 MG 24 hr capsule, Take 1 capsule by mouth Daily., Disp: 90 capsule, Rfl: 0  •  celecoxib (CeleBREX) 200 MG capsule, Take 1 capsule by mouth Daily., Disp: 90 capsule, Rfl: 0  •  lidocaine (Lidoderm) 5 %, Place 2 patches on the skin as directed by provider Daily. Remove & Discard patch within 12 hours or as directed by MD, Disp: 60 patch, Rfl: 1   Family History   Problem Relation Age of Onset   • Arthritis Mother    • Malig Hyperthermia Neg Hx           Vital Signs:   Vitals:    03/16/23 0808   BP: 125/70   Pulse: 80   Temp: 98.2 °F (36.8 °C)   SpO2: 99%   Weight: 102 kg (225 lb)   Height: 177.8 cm (70\")       Review of Systems   Physical Exam  Vitals reviewed.   Constitutional:       Appearance: Normal appearance. He is well-developed.   HENT:      Head: Normocephalic and atraumatic.      Right Ear: External ear normal.      Left Ear: External ear normal.      Mouth/Throat:      Pharynx: No oropharyngeal exudate.   Eyes:      Conjunctiva/sclera: Conjunctivae normal.      Pupils: Pupils are equal, round, and reactive to light.   Cardiovascular:      Rate and Rhythm: Normal rate and regular rhythm.      Pulses: Normal pulses.      Heart sounds: Normal heart sounds. No murmur heard.    No friction rub. No gallop.   Pulmonary:      Effort: Pulmonary effort is normal.      Breath sounds: Normal breath sounds. No wheezing or rhonchi.   Abdominal:      General: Abdomen is flat. Bowel sounds are normal. There is no distension.      Palpations: Abdomen is soft. There is no mass.      Tenderness: There is no abdominal tenderness. There is no guarding or rebound.      Hernia: No hernia is present.   Musculoskeletal:    "      General: Normal range of motion.      Comments: Bilateral generalized wrist tenderness, normal ROM, stable, no redness, warmth, swelling, or bruising.   Skin:     General: Skin is warm and dry.      Capillary Refill: Capillary refill takes less than 2 seconds.   Neurological:      General: No focal deficit present.      Mental Status: He is alert and oriented to person, place, and time.      Cranial Nerves: No cranial nerve deficit.   Psychiatric:         Mood and Affect: Mood and affect normal.         Behavior: Behavior normal.         Thought Content: Thought content normal.         Judgment: Judgment normal.        Result Review :   The following data was reviewed by: Wei Lauren MD on 03/16/2023:    Data reviewed: Radiologic studies I viewed and interpreted 3 views of both wrists x-rays:no fxs.          Assessment and Plan    Diagnoses and all orders for this visit:    1. Multiple joint pain (Primary)  -     ADRIANO With / DsDNA, RNP, Sjogrens A / B, Smith  -     Rheumatoid Factor  -     Uric Acid  -     CBC Auto Differential  -     Comprehensive Metabolic Panel  -     Cyclic Citrul Peptide Antibody, IgG / IgA    2. Pain in both wrists  -     Ambulatory Referral to Orthopedic Surgery  -     XR Wrist 3+ View Bilateral (In Office)  -     lidocaine (Lidoderm) 5 %; Place 2 patches on the skin as directed by provider Daily. Remove & Discard patch within 12 hours or as directed by MD  Dispense: 60 patch; Refill: 1  -     celecoxib (CeleBREX) 200 MG capsule; Take 1 capsule by mouth Daily.  Dispense: 90 capsule; Refill: 0    3. Mixed hyperlipidemia  -     atorvastatin (LIPITOR) 40 MG tablet; Take 1 tablet by mouth Daily.  Dispense: 90 tablet; Refill: 0  -     fenofibrate (TRICOR) 145 MG tablet; Take 1 tablet by mouth every night at bedtime.  Dispense: 90 tablet; Refill: 0    4. Type 2 diabetes mellitus with hyperglycemia, without long-term current use of insulin ([facility])  -     dapagliflozin Propanediol (Farxiga) 10  MG tablet; Take 10 mg by mouth Daily.  Dispense: 90 tablet; Refill: 0  -     metFORMIN (GLUCOPHAGE) 500 MG tablet; Take 2 tablets by mouth 2 (Two) Times a Day With Meals.  Dispense: 360 tablet; Refill: 0  -     SITagliptin (Januvia) 100 MG tablet; Take 1 tablet by mouth Daily.  Dispense: 90 tablet; Refill: 0    5. Primary hypertension  -     hydroCHLOROthiazide (HYDRODIURIL) 25 MG tablet; Take 1 tablet by mouth Daily.  Dispense: 90 tablet; Refill: 0  -     losartan (COZAAR) 100 MG tablet; Take 1 tablet by mouth Daily.  Dispense: 90 tablet; Refill: 0    6. Nausea and vomiting, unspecified vomiting type  -     pantoprazole (PROTONIX) 40 MG EC tablet; Take 1 tablet by mouth Daily.  Dispense: 90 tablet; Refill: 0    7. Anxiety and depression  -     venlafaxine XR (EFFEXOR-XR) 150 MG 24 hr capsule; Take 1 capsule by mouth Daily.  Dispense: 90 capsule; Refill: 0            Follow Up   Return in about 3 months (around 6/16/2023) for Recheck, Medicare Wellness.  Patient was given instructions and counseling regarding his condition or for health maintenance advice. Please see specific information pulled into the AVS if appropriate.

## 2023-03-16 NOTE — PROGRESS NOTES
Venipuncture Blood Specimen Collection  Venipuncture performed in left arm by Christina Khanna with good hemostasis. Patient tolerated the procedure well without complications.   03/16/23   Christina Khanna

## 2023-03-17 LAB
ANA SER QL: NEGATIVE
CCP IGA+IGG SERPL IA-ACNC: 2 UNITS (ref 0–19)

## 2023-04-12 ENCOUNTER — OFFICE VISIT (OUTPATIENT)
Dept: ORTHOPEDIC SURGERY | Facility: CLINIC | Age: 53
End: 2023-04-12
Payer: MEDICARE

## 2023-04-12 VITALS — WEIGHT: 225 LBS | BODY MASS INDEX: 32.21 KG/M2 | OXYGEN SATURATION: 99 % | HEART RATE: 119 BPM | HEIGHT: 70 IN

## 2023-04-12 DIAGNOSIS — G56.03 CARPAL TUNNEL SYNDROME, BILATERAL: Primary | ICD-10-CM

## 2023-04-12 NOTE — PROGRESS NOTES
"Chief Complaint  Initial Evaluation and Pain of the Left Wrist and Initial Evaluation and Pain of the Right Wrist     Subjective      Robby Hernandes presents to [facility] for initial pain of bilateral wrists. He has had carpal tunnel surgery in the past. He has numbness in the left wrist and numbness up the left arm and nothing in the hand. He notes numbness in the right fingers and wrist a little. He has difficulty with FMC and gross  strength.      Allergies   Allergen Reactions   • Lyrica [Pregabalin] Hallucinations   • Oxycodone-Acetaminophen Itching   • Tree Extract Other (See Comments)     Runny nose, sneezing, congestion        Social History     Socioeconomic History   • Marital status: Single   Tobacco Use   • Smoking status: Former     Packs/day: 2.00     Years: 10.00     Pack years: 20.00     Types: Cigarettes     Start date: 12/7/1985     Quit date: 2008     Years since quitting: 15.2   • Smokeless tobacco: Never   Vaping Use   • Vaping Use: Never used   Substance and Sexual Activity   • Alcohol use: Yes     Alcohol/week: 15.0 standard drinks     Types: 15 Cans of beer per week   • Drug use: No   • Sexual activity: Defer        Review of Systems     Objective   Vital Signs:   Pulse 119   Ht 177.8 cm (70\")   Wt 102 kg (225 lb)   SpO2 99%   BMI 32.28 kg/m²       Physical Exam  Constitutional:       Appearance: Normal appearance. Patient is well-developed and normal weight.   HENT:      Head: Normocephalic.      Right Ear: Hearing and external ear normal.      Left Ear: Hearing and external ear normal.      Nose: Nose normal.   Eyes:      Conjunctiva/sclera: Conjunctivae normal.   Cardiovascular:      Rate and Rhythm: Normal rate.   Pulmonary:      Effort: Pulmonary effort is normal.      Breath sounds: No wheezing or rales.   Abdominal:      Palpations: Abdomen is soft.      Tenderness: There is no abdominal tenderness.   Musculoskeletal:      Cervical back: Normal range " of motion.   Skin:     Findings: No rash.   Neurological:      Mental Status: Patient  is alert and oriented to person, place, and time.   Psychiatric:         Mood and Affect: Mood and affect normal.         Judgment: Judgment normal.       Ortho Exam      BILATERAL WRISTS Negative Compression testing/ Positive Tinels. NegativeFinkelsteins. Negative Sen's testing. Negative CMC grind testing. Positive Phalens. Full ROM of the hand, fingers, elbow and wrist. Negative Triggering of the digit. Sensation grossly intact to light touch, median, radial and ulnar nerve. Positive AIN, PIN and ulnar nerve motor function intact. Axillary nerve intact. Positive pulses.          Procedures        Imaging Results (Most Recent)     None           Result Review :       XR Wrist 3+ View Bilateral (In Office)    Result Date: 3/16/2023  Narrative: PROCEDURE: XR WRIST 3+ VW BILATERAL  COMPARISON: [facility], , WRIST >OR= 3V RT, 5/13/2015, 8:51.  INDICATIONS: bilateral wrist pain  FINDINGS:  There is no displaced fracture or dislocation bilaterally.  The articulations are intact.  Mild changes of osteoarthritis are noted.  There is irregularity along the dorsal aspect of the carpus on the right suggesting the sequelae of remote trauma.  The soft tissues are unremarkable.      Impression:   1. No evidence for displaced fracture or malalignment. 2. Mild changes of osteoarthritis are noted.      LATHA BAUER MD       Electronically Signed and Approved By: LATHA BAUER MD on 3/16/2023 at 9:06                      Assessment and Plan     Diagnoses and all orders for this visit:    1. Carpal tunnel syndrome, bilateral (Primary)        Discussed the treatment plan with the patient. I reviewed the X-rays that were obtained 3/16/23 with the patient. EMG for bilateral wrists.        Call or return if worsening symptoms.    Follow Up     After EMG      Patient was given instructions and counseling regarding his condition or  for health maintenance advice. Please see specific information pulled into the AVS if appropriate.     Scribed for Julio Delcid MD by Mary Perea MA.  04/12/23   10:52 EDT    I have personally performed the services described in this document as scribed by the above individual and it is both accurate and complete. Julio Delcid MD 04/14/23

## 2023-04-21 DIAGNOSIS — E11.65 TYPE 2 DIABETES MELLITUS WITH HYPERGLYCEMIA, WITHOUT LONG-TERM CURRENT USE OF INSULIN: ICD-10-CM

## 2023-04-22 RX ORDER — ASPIRIN 81 MG/1
TABLET, COATED ORAL
Qty: 30 TABLET | Refills: 0 | Status: SHIPPED | OUTPATIENT
Start: 2023-04-22

## 2023-05-22 ENCOUNTER — OFFICE VISIT (OUTPATIENT)
Dept: FAMILY MEDICINE CLINIC | Facility: CLINIC | Age: 53
End: 2023-05-22
Payer: MEDICARE

## 2023-05-22 VITALS
DIASTOLIC BLOOD PRESSURE: 82 MMHG | BODY MASS INDEX: 31.92 KG/M2 | HEIGHT: 70 IN | WEIGHT: 223 LBS | TEMPERATURE: 96.6 F | HEART RATE: 100 BPM | SYSTOLIC BLOOD PRESSURE: 120 MMHG | OXYGEN SATURATION: 97 %

## 2023-05-22 DIAGNOSIS — M54.2 NECK PAIN: Primary | ICD-10-CM

## 2023-05-22 DIAGNOSIS — M25.511 ACUTE PAIN OF RIGHT SHOULDER: ICD-10-CM

## 2023-05-22 PROCEDURE — 3074F SYST BP LT 130 MM HG: CPT | Performed by: FAMILY MEDICINE

## 2023-05-22 PROCEDURE — 99213 OFFICE O/P EST LOW 20 MIN: CPT | Performed by: FAMILY MEDICINE

## 2023-05-22 PROCEDURE — 1159F MED LIST DOCD IN RCRD: CPT | Performed by: FAMILY MEDICINE

## 2023-05-22 PROCEDURE — 1160F RVW MEDS BY RX/DR IN RCRD: CPT | Performed by: FAMILY MEDICINE

## 2023-05-22 PROCEDURE — 3079F DIAST BP 80-89 MM HG: CPT | Performed by: FAMILY MEDICINE

## 2023-05-22 RX ORDER — BACLOFEN 10 MG/1
10 TABLET ORAL 3 TIMES DAILY PRN
Qty: 30 TABLET | Refills: 1 | Status: SHIPPED | OUTPATIENT
Start: 2023-05-22

## 2023-05-22 RX ORDER — LIDOCAINE 50 MG/G
2 PATCH TOPICAL EVERY 24 HOURS
Qty: 60 PATCH | Refills: 1 | Status: SHIPPED | OUTPATIENT
Start: 2023-05-22

## 2023-05-22 NOTE — PROGRESS NOTES
Chief Complaint  Arm Pain (Right arm pain, x 2-3 months)    Subjective          Robby Hernandes presents to [facility]  History of Present Illness  Pt has been having right arm pain and neck pain x 3-4 months- gradual onset- worsening symptoms- no known injury      Objective   Allergies   Allergen Reactions   • Lyrica [Pregabalin] Hallucinations   • Tree Extract Other (See Comments)     Runny nose, sneezing, congestion     Immunization History   Administered Date(s) Administered   • Flu Vaccine Split Quad 09/25/2017, 10/05/2017, 09/03/2018, 12/05/2019   • FluLaval/Fluzone >6mos 09/25/2017, 12/07/2021   • Hepatitis A 04/11/2019   • Influenza Inj MDCK Preserative Free 11/02/2015   • Influenza Injectable Mdck Pf Quad 10/20/2022   • Influenza Quad Vaccine (Inpatient) 08/10/2016   • Influenza, Unspecified 11/02/2015, 10/19/2022   • Pneumococcal Conjugate 20-Valent (PCV20) 06/28/2022   • Pneumococcal Polysaccharide (PPSV23) 08/10/2016, 12/05/2019   • Shingrix 10/20/2022   • Tdap 07/28/2017, 01/07/2019     Past Medical History:   Diagnosis Date   • Anxiety and depression    • Asthma    • Chronic knee pain     RIGHT   • Complex regional pain syndrome     LEFT ANKLE   • COPD (chronic obstructive pulmonary disease)    • Diabetes mellitus    • Fracture of nasal septum 1987    CAN NOT BREATH OUT OF RIGHT NOSTRIL   • Hiatal hernia    • High cholesterol    • Hip pain, chronic, left    • History of heart attack     PER EKG DR MILIAN   • History of kidney stones    • [place] (hard of hearing)    • Hypertension    • Insomnia    • Low back pain    • Shoulder pain, left    • Sleep apnea     DOES NOT USE MACHINE   • Tinnitus       Past Surgical History:   Procedure Laterality Date   • ANKLE FUSION Left    • CARPAL TUNNEL RELEASE Right    • COLONOSCOPY      Cologaurd   • CYSTOSCOPY BLADDER STONE LITHOTRIPSY     • SPINAL CORD STIMULATOR IMPLANT      battery is on the right   • TOTAL HIP ARTHROPLASTY Right    •  TOTAL HIP ARTHROPLASTY Left 02/21/2018    Procedure: LT TOTAL HIP ARTHROPLASTY ANTERIOR WITH HANA TABLE;  Surgeon: Dameon Briggs MD;  Location: [facility];  Service:       Social History     Socioeconomic History   • Marital status: Single   Tobacco Use   • Smoking status: Former     Packs/day: 2.00     Years: 10.00     Pack years: 20.00     Types: Cigarettes     Start date: 12/7/1985     Quit date: 2008     Years since quitting: 15.3     Passive exposure: Past   • Smokeless tobacco: Never   Vaping Use   • Vaping Use: Never used   Substance and Sexual Activity   • Alcohol use: Yes     Alcohol/week: 15.0 standard drinks     Types: 15 Cans of beer per week   • Drug use: No   • Sexual activity: Defer        Current Outpatient Medications:   •  Aspirin Low Dose 81 MG EC tablet, TAKE 1 TABLET BY MOUTH EVERY DAY, Disp: 30 tablet, Rfl: 0  •  atorvastatin (LIPITOR) 40 MG tablet, Take 1 tablet by mouth Daily., Disp: 90 tablet, Rfl: 0  •  Blood Glucose Monitoring Suppl (FreeStyle Lite) w/Device kit, 1 each Daily., Disp: 1 kit, Rfl: 0  •  celecoxib (CeleBREX) 200 MG capsule, Take 1 capsule by mouth Daily., Disp: 90 capsule, Rfl: 0  •  dapagliflozin Propanediol (Farxiga) 10 MG tablet, Take 10 mg by mouth Daily., Disp: 90 tablet, Rfl: 0  •  fenofibrate (TRICOR) 145 MG tablet, Take 1 tablet by mouth every night at bedtime., Disp: 90 tablet, Rfl: 0  •  glucose blood (FREESTYLE LITE) test strip, Test BG daily, Disp: 100 each, Rfl: 3  •  hydroCHLOROthiazide (HYDRODIURIL) 25 MG tablet, Take 1 tablet by mouth Daily., Disp: 90 tablet, Rfl: 0  •  Lancets (freestyle) lancets, Test BG daily, Disp: 100 each, Rfl: 3  •  losartan (COZAAR) 100 MG tablet, Take 1 tablet by mouth Daily., Disp: 90 tablet, Rfl: 0  •  metFORMIN (GLUCOPHAGE) 500 MG tablet, Take 2 tablets by mouth 2 (Two) Times a Day With Meals., Disp: 360 tablet, Rfl: 0  •  pantoprazole (PROTONIX) 40 MG EC tablet, Take 1 tablet by mouth Daily., Disp: 90 tablet, Rfl: 0  •   "polyethylene glycol (MIRALAX) 17 g packet, Take 17 g by mouth Daily., Disp: 30 each, Rfl: 5  •  sennosides-docusate sodium (SENOKOT-S) 8.6-50 MG tablet, Take 2 tablets by mouth 2 (Two) Times a Day., Disp: 60 tablet, Rfl: 1  •  SITagliptin (Januvia) 100 MG tablet, Take 1 tablet by mouth Daily., Disp: 90 tablet, Rfl: 0  •  venlafaxine XR (EFFEXOR-XR) 150 MG 24 hr capsule, Take 1 capsule by mouth Daily., Disp: 90 capsule, Rfl: 0  •  baclofen (LIORESAL) 10 MG tablet, Take 1 tablet by mouth 3 (Three) Times a Day As Needed for Muscle Spasms., Disp: 30 tablet, Rfl: 1  •  lidocaine (Lidoderm) 5 %, Place 2 patches on the skin as directed by provider Daily. Remove & Discard patch within 12 hours or as directed by MD, Disp: 60 patch, Rfl: 1   Family History   Problem Relation Age of Onset   • Asthma Mother    • COPD Mother    • Heart disease Mother    • Arthritis Mother    • Fibromyalgia Mother    • Heart disease Father    • Malig Hyperthermia Neg Hx           Vital Signs:   Vitals:    05/22/23 1518   BP: 120/82   BP Location: Left arm   Pulse: 100   Temp: 96.6 °F (35.9 °C)   SpO2: 97%   Weight: 101 kg (223 lb)   Height: 177.8 cm (70\")       Review of Systems   Physical Exam  Vitals reviewed.   Constitutional:       Appearance: Normal appearance. He is well-developed.   HENT:      Head: Normocephalic and atraumatic.      Right Ear: External ear normal.      Left Ear: External ear normal.      Mouth/Throat:      Pharynx: No oropharyngeal exudate.   Eyes:      Conjunctiva/sclera: Conjunctivae normal.      Pupils: Pupils are equal, round, and reactive to light.   Cardiovascular:      Rate and Rhythm: Normal rate and regular rhythm.      Pulses: Normal pulses.      Heart sounds: Normal heart sounds. No murmur heard.    No friction rub. No gallop.   Pulmonary:      Effort: Pulmonary effort is normal.      Breath sounds: Normal breath sounds. No wheezing or rhonchi.   Abdominal:      General: Abdomen is flat. Bowel sounds are " normal. There is no distension.      Palpations: Abdomen is soft. There is no mass.      Tenderness: There is no abdominal tenderness. There is no guarding or rebound.      Hernia: No hernia is present.   Musculoskeletal:      Comments: Bilateral neck paraspinal muscle tenderness, normal ROM, stable, no vertebrae tenderness, no redness, warmth, swelling, or bruising.    Right posterior shoulder muscle tenderness, dec ROM, stable, no redness, warmth, swelling, or bruising.   Skin:     General: Skin is warm and dry.      Capillary Refill: Capillary refill takes less than 2 seconds.   Neurological:      General: No focal deficit present.      Mental Status: He is alert and oriented to person, place, and time.      Cranial Nerves: No cranial nerve deficit.   Psychiatric:         Mood and Affect: Mood and affect normal.         Behavior: Behavior normal.         Thought Content: Thought content normal.         Judgment: Judgment normal.        Result Review :   The following data was reviewed by: Wei Lauren MD on 05/22/2023:    Data reviewed: Radiologic studies I viewed and interpreted 2 views right shoulder x-rays, 3 views cervical spine x-rays:no fxs.          Assessment and Plan    Diagnoses and all orders for this visit:    1. Neck pain (Primary)  -     XR Spine Cervical Complete 4 or 5 View (In Office)  -     Ambulatory Referral to Physical Therapy Evaluate and treat  -     lidocaine (Lidoderm) 5 %; Place 2 patches on the skin as directed by provider Daily. Remove & Discard patch within 12 hours or as directed by MD  Dispense: 60 patch; Refill: 1  -     baclofen (LIORESAL) 10 MG tablet; Take 1 tablet by mouth 3 (Three) Times a Day As Needed for Muscle Spasms.  Dispense: 30 tablet; Refill: 1    2. Acute pain of right shoulder  -     Cancel: Ambulatory Referral to Orthopedic Surgery  -     XR Shoulder 2+ View Right (In Office)  -     Ambulatory Referral to Orthopedic Surgery            Follow Up   Return if  symptoms worsen or fail to improve.  Patient was given instructions and counseling regarding his condition or for health maintenance advice. Please see specific information pulled into the AVS if appropriate.

## 2023-05-28 DIAGNOSIS — E11.65 TYPE 2 DIABETES MELLITUS WITH HYPERGLYCEMIA, WITHOUT LONG-TERM CURRENT USE OF INSULIN: ICD-10-CM

## 2023-05-30 RX ORDER — ASPIRIN 81 MG/1
TABLET, COATED ORAL
Qty: 30 TABLET | Refills: 0 | Status: SHIPPED | OUTPATIENT
Start: 2023-05-30

## 2023-08-07 DIAGNOSIS — E11.65 TYPE 2 DIABETES MELLITUS WITH HYPERGLYCEMIA, WITHOUT LONG-TERM CURRENT USE OF INSULIN: Primary | ICD-10-CM

## 2023-09-07 ENCOUNTER — OFFICE VISIT (OUTPATIENT)
Dept: FAMILY MEDICINE CLINIC | Facility: CLINIC | Age: 53
End: 2023-09-07
Payer: MEDICARE

## 2023-09-07 VITALS
WEIGHT: 206.8 LBS | DIASTOLIC BLOOD PRESSURE: 80 MMHG | OXYGEN SATURATION: 97 % | TEMPERATURE: 97.5 F | BODY MASS INDEX: 30.63 KG/M2 | SYSTOLIC BLOOD PRESSURE: 158 MMHG | HEIGHT: 69 IN | HEART RATE: 100 BPM

## 2023-09-07 DIAGNOSIS — I10 PRIMARY HYPERTENSION: ICD-10-CM

## 2023-09-07 DIAGNOSIS — F32.A ANXIETY AND DEPRESSION: ICD-10-CM

## 2023-09-07 DIAGNOSIS — R11.0 CHRONIC NAUSEA: ICD-10-CM

## 2023-09-07 DIAGNOSIS — R11.2 NAUSEA AND VOMITING, UNSPECIFIED VOMITING TYPE: ICD-10-CM

## 2023-09-07 DIAGNOSIS — Z59.41 FOOD INSECURITY: ICD-10-CM

## 2023-09-07 DIAGNOSIS — Z12.5 SCREENING PSA (PROSTATE SPECIFIC ANTIGEN): ICD-10-CM

## 2023-09-07 DIAGNOSIS — Z00.00 MEDICARE ANNUAL WELLNESS VISIT, SUBSEQUENT: Primary | ICD-10-CM

## 2023-09-07 DIAGNOSIS — Z59.819 HOUSING INSECURITY: ICD-10-CM

## 2023-09-07 DIAGNOSIS — F41.9 ANXIETY AND DEPRESSION: ICD-10-CM

## 2023-09-07 DIAGNOSIS — R74.8 ELEVATED LIVER ENZYMES: ICD-10-CM

## 2023-09-07 DIAGNOSIS — E11.65 TYPE 2 DIABETES MELLITUS WITH HYPERGLYCEMIA, WITHOUT LONG-TERM CURRENT USE OF INSULIN: ICD-10-CM

## 2023-09-07 DIAGNOSIS — L84 PRE-ULCERATIVE CORN OR CALLOUS: ICD-10-CM

## 2023-09-07 DIAGNOSIS — K76.0 FATTY LIVER: ICD-10-CM

## 2023-09-07 DIAGNOSIS — E78.2 MIXED HYPERLIPIDEMIA: ICD-10-CM

## 2023-09-07 RX ORDER — DAPAGLIFLOZIN 10 MG/1
10 TABLET, FILM COATED ORAL DAILY
Qty: 30 TABLET | Refills: 5 | Status: SHIPPED | OUTPATIENT
Start: 2023-09-07

## 2023-09-07 RX ORDER — DICLOFENAC SODIUM 75 MG/1
TABLET, DELAYED RELEASE ORAL
COMMUNITY
Start: 2023-07-28

## 2023-09-07 RX ORDER — FENOFIBRATE 145 MG/1
145 TABLET, COATED ORAL
Qty: 30 TABLET | Refills: 5 | Status: SHIPPED | OUTPATIENT
Start: 2023-09-07

## 2023-09-07 RX ORDER — ATORVASTATIN CALCIUM 40 MG/1
40 TABLET, FILM COATED ORAL DAILY
Qty: 30 TABLET | Refills: 5 | Status: SHIPPED | OUTPATIENT
Start: 2023-09-07

## 2023-09-07 RX ORDER — VENLAFAXINE HYDROCHLORIDE 150 MG/1
150 CAPSULE, EXTENDED RELEASE ORAL DAILY
Qty: 30 CAPSULE | Refills: 5 | Status: SHIPPED | OUTPATIENT
Start: 2023-09-07

## 2023-09-07 RX ORDER — PANTOPRAZOLE SODIUM 40 MG/1
40 TABLET, DELAYED RELEASE ORAL DAILY
Qty: 30 TABLET | Refills: 5 | Status: SHIPPED | OUTPATIENT
Start: 2023-09-07

## 2023-09-07 RX ORDER — HYDROCHLOROTHIAZIDE 25 MG/1
25 TABLET ORAL DAILY
Qty: 30 TABLET | Refills: 5 | Status: SHIPPED | OUTPATIENT
Start: 2023-09-07

## 2023-09-07 RX ORDER — LOSARTAN POTASSIUM 100 MG/1
100 TABLET ORAL DAILY
Qty: 30 TABLET | Refills: 5 | Status: SHIPPED | OUTPATIENT
Start: 2023-09-07

## 2023-09-07 RX ORDER — ASPIRIN 81 MG/1
81 TABLET ORAL DAILY
Qty: 30 TABLET | Refills: 5 | Status: SHIPPED | OUTPATIENT
Start: 2023-09-07

## 2023-09-07 SDOH — ECONOMIC STABILITY - HOUSING INSECURITY: HOUSING INSTABILITY UNSPECIFIED: Z59.819

## 2023-09-07 SDOH — ECONOMIC STABILITY - FOOD INSECURITY: FOOD INSECURITY: Z59.41

## 2023-09-07 NOTE — PROGRESS NOTES
The ABCs of the Annual Wellness Visit  Subsequent Medicare Wellness Visit    Subjective    Robby Hernandes is a 53 y.o. male who presents for a Subsequent Medicare Wellness Visit.    The following portions of the patient's history were reviewed and   updated as appropriate: He  has a past medical history of Anxiety and depression, Asthma, Chronic knee pain, Complex regional pain syndrome, COPD (chronic obstructive pulmonary disease), Diabetes mellitus, Fracture of nasal septum (1987), Hiatal hernia, High cholesterol, Hip pain, chronic, left, History of heart attack, History of kidney stones, [place] (hard of hearing), Hypertension, Insomnia, Low back pain, Shoulder pain, left, Sleep apnea, and Tinnitus.  He does not have any pertinent problems on file.  He  has a past surgical history that includes Ankle Fusion (Left); Total hip arthroplasty (Right); cystoscopy bladder stone lithotripsy; Spinal cord stimulator implant; Carpal tunnel release (Right); Total hip arthroplasty (Left, 02/21/2018); and Colonoscopy.  His family history includes Arthritis in his mother; Asthma in his mother; COPD in his mother; Fibromyalgia in his mother; Heart disease in his father and mother.  He  reports that he quit smoking about 15 years ago. His smoking use included cigarettes. He started smoking about 37 years ago. He has a 20.00 pack-year smoking history. He has been exposed to tobacco smoke. He has never used smokeless tobacco. He reports current alcohol use of about 15.0 standard drinks per week. He reports current drug use. Drug: Marijuana.  Current Outpatient Medications   Medication Sig Dispense Refill    aspirin (Aspirin Low Dose) 81 MG EC tablet Take 1 tablet by mouth Daily. 30 tablet 5    atorvastatin (LIPITOR) 40 MG tablet Take 1 tablet by mouth Daily. 30 tablet 5    baclofen (LIORESAL) 10 MG tablet Take 1 tablet by mouth 3 (Three) Times a Day As Needed for Muscle Spasms. 30 tablet 1    Blood Glucose Monitoring Suppl (FreeStyle  Lite) w/Device kit 1 each Daily. 1 kit 0    celecoxib (CeleBREX) 200 MG capsule Take 1 capsule by mouth Daily. 90 capsule 0    dapagliflozin Propanediol (Farxiga) 10 MG tablet Take 10 mg by mouth Daily. 30 tablet 5    diclofenac (VOLTAREN) 75 MG EC tablet       fenofibrate (TRICOR) 145 MG tablet Take 1 tablet by mouth every night at bedtime. 30 tablet 5    glucose blood (FREESTYLE LITE) test strip Test BG daily 100 each 3    hydroCHLOROthiazide (HYDRODIURIL) 25 MG tablet Take 1 tablet by mouth Daily. 30 tablet 5    Lancets (freestyle) lancets Test BG daily 100 each 3    losartan (COZAAR) 100 MG tablet Take 1 tablet by mouth Daily. 30 tablet 5    metFORMIN (GLUCOPHAGE) 500 MG tablet Take 2 tablets by mouth 2 (Two) Times a Day With Meals. 120 tablet 5    pantoprazole (PROTONIX) 40 MG EC tablet Take 1 tablet by mouth Daily. 30 tablet 5    SITagliptin (Januvia) 100 MG tablet Take 1 tablet by mouth Daily. 30 tablet 5    venlafaxine XR (EFFEXOR-XR) 150 MG 24 hr capsule Take 1 capsule by mouth Daily. 30 capsule 5     No current facility-administered medications for this visit.     Current Outpatient Medications on File Prior to Visit   Medication Sig    baclofen (LIORESAL) 10 MG tablet Take 1 tablet by mouth 3 (Three) Times a Day As Needed for Muscle Spasms.    Blood Glucose Monitoring Suppl (FreeStyle Lite) w/Device kit 1 each Daily.    celecoxib (CeleBREX) 200 MG capsule Take 1 capsule by mouth Daily.    diclofenac (VOLTAREN) 75 MG EC tablet     glucose blood (FREESTYLE LITE) test strip Test BG daily    Lancets (freestyle) lancets Test BG daily    [DISCONTINUED] Aspirin Low Dose 81 MG EC tablet TAKE 1 TABLET BY MOUTH EVERY DAY    [DISCONTINUED] atorvastatin (LIPITOR) 40 MG tablet Take 1 tablet by mouth Daily.    [DISCONTINUED] dapagliflozin Propanediol (Farxiga) 10 MG tablet Take 10 mg by mouth Daily.    [DISCONTINUED] fenofibrate (TRICOR) 145 MG tablet Take 1 tablet by mouth every night at bedtime.    [DISCONTINUED]  hydroCHLOROthiazide (HYDRODIURIL) 25 MG tablet Take 1 tablet by mouth Daily.    [DISCONTINUED] losartan (COZAAR) 100 MG tablet Take 1 tablet by mouth Daily.    [DISCONTINUED] metFORMIN (GLUCOPHAGE) 500 MG tablet Take 2 tablets by mouth 2 (Two) Times a Day With Meals.    [DISCONTINUED] pantoprazole (PROTONIX) 40 MG EC tablet Take 1 tablet by mouth Daily.    [DISCONTINUED] SITagliptin (Januvia) 100 MG tablet Take 1 tablet by mouth Daily.    [DISCONTINUED] venlafaxine XR (EFFEXOR-XR) 150 MG 24 hr capsule Take 1 capsule by mouth Daily.    [DISCONTINUED] lidocaine (Lidoderm) 5 % Place 2 patches on the skin as directed by provider Daily. Remove & Discard patch within 12 hours or as directed by MD    [DISCONTINUED] polyethylene glycol (MIRALAX) 17 g packet Take 17 g by mouth Daily.    [DISCONTINUED] sennosides-docusate sodium (SENOKOT-S) 8.6-50 MG tablet Take 2 tablets by mouth 2 (Two) Times a Day.     No current facility-administered medications on file prior to visit.     He is allergic to lyrica [pregabalin] and tree extract..    Compared to one year ago, the patient feels his physical   health is worse.    Compared to one year ago, the patient feels his mental   health is better.    Recent Hospitalizations:  He was not admitted to the hospital during the last year.       Current Medical Providers:  Patient Care Team:  Wei Lauren MD as PCP - General (Family Medicine)  Perfecto Rutledge MD as Consulting Physician (Cardiology)    Outpatient Medications Prior to Visit   Medication Sig Dispense Refill    baclofen (LIORESAL) 10 MG tablet Take 1 tablet by mouth 3 (Three) Times a Day As Needed for Muscle Spasms. 30 tablet 1    Blood Glucose Monitoring Suppl (FreeStyle Lite) w/Device kit 1 each Daily. 1 kit 0    celecoxib (CeleBREX) 200 MG capsule Take 1 capsule by mouth Daily. 90 capsule 0    diclofenac (VOLTAREN) 75 MG EC tablet       glucose blood (FREESTYLE LITE) test strip Test BG daily 100 each 3    Lancets  (freestyle) lancets Test BG daily 100 each 3    Aspirin Low Dose 81 MG EC tablet TAKE 1 TABLET BY MOUTH EVERY DAY 30 tablet 0    atorvastatin (LIPITOR) 40 MG tablet Take 1 tablet by mouth Daily. 90 tablet 0    dapagliflozin Propanediol (Farxiga) 10 MG tablet Take 10 mg by mouth Daily. 90 tablet 0    fenofibrate (TRICOR) 145 MG tablet Take 1 tablet by mouth every night at bedtime. 90 tablet 0    hydroCHLOROthiazide (HYDRODIURIL) 25 MG tablet Take 1 tablet by mouth Daily. 90 tablet 0    losartan (COZAAR) 100 MG tablet Take 1 tablet by mouth Daily. 90 tablet 0    metFORMIN (GLUCOPHAGE) 500 MG tablet Take 2 tablets by mouth 2 (Two) Times a Day With Meals. 360 tablet 0    pantoprazole (PROTONIX) 40 MG EC tablet Take 1 tablet by mouth Daily. 90 tablet 0    SITagliptin (Januvia) 100 MG tablet Take 1 tablet by mouth Daily. 90 tablet 0    venlafaxine XR (EFFEXOR-XR) 150 MG 24 hr capsule Take 1 capsule by mouth Daily. 90 capsule 0    lidocaine (Lidoderm) 5 % Place 2 patches on the skin as directed by provider Daily. Remove & Discard patch within 12 hours or as directed by MD 60 patch 1    polyethylene glycol (MIRALAX) 17 g packet Take 17 g by mouth Daily. 30 each 5    sennosides-docusate sodium (SENOKOT-S) 8.6-50 MG tablet Take 2 tablets by mouth 2 (Two) Times a Day. 60 tablet 1     No facility-administered medications prior to visit.       No opioid medication identified on active medication list. I have reviewed chart for other potential  high risk medication/s and harmful drug interactions in the elderly.        Aspirin is on active medication list. Aspirin use is indicated based on review of current medical condition/s. Pros and cons of this therapy have been discussed today. Benefits of this medication outweigh potential harm.  Patient has been encouraged to continue taking this medication.  .      Patient Active Problem List   Diagnosis    Osteoarthritis of left hip    Type 2 diabetes mellitus with hyperglycemia     "Hypertension    COPD (chronic obstructive pulmonary disease)    Sinus tachycardia    Allergic rhinitis    Anxiety    Arthritis    Sleep apnea    Benign enlargement of prostate    Cervical nerve root injury    Depressive disorder    Non-insulin dependent type 2 diabetes mellitus    Diabetic polyneuropathy associated with diabetes mellitus due to underlying condition     Advance Care Planning   Advance Care Planning     Advance Directive is not on file.  ACP discussion was held with the patient during this visit. Patient does not have an advance directive, information provided.     Objective    Vitals:    23 1648   BP: 158/80   Pulse: 100   Temp: 97.5 °F (36.4 °C)   SpO2: 97%   Weight: 93.8 kg (206 lb 12.8 oz)   Height: 175.3 cm (69\")   PainSc:   6     Estimated body mass index is 30.54 kg/m² as calculated from the following:    Height as of this encounter: 175.3 cm (69\").    Weight as of this encounter: 93.8 kg (206 lb 12.8 oz).    BMI is >= 30 and <35. (Class 1 Obesity). The following options were offered after discussion;: exercise counseling/recommendations and nutrition counseling/recommendations      Does the patient have evidence of cognitive impairment? No          HEALTH RISK ASSESSMENT    Smoking Status:  Social History     Tobacco Use   Smoking Status Former    Packs/day: 2.00    Years: 10.00    Pack years: 20.00    Types: Cigarettes    Start date: 1985    Quit date:     Years since quitting: 15.6    Passive exposure: Past   Smokeless Tobacco Never     Alcohol Consumption:  Social History     Substance and Sexual Activity   Alcohol Use Yes    Alcohol/week: 15.0 standard drinks    Types: 15 Cans of beer per week     Fall Risk Screen:    FAMILIAADI Fall Risk Assessment was completed, and patient is at MODERATE risk for falls. Assessment completed on:2023    Depression Screenin/7/2023     4:51 PM   PHQ-2/PHQ-9 Depression Screening   Little Interest or Pleasure in Doing Things 0-->not at " all   Feeling Down, Depressed or Hopeless 0-->not at all   PHQ-9: Brief Depression Severity Measure Score 0       Health Habits and Functional and Cognitive Screenin/7/2023     4:00 PM   Functional & Cognitive Status   Do you have difficulty preparing food and eating? Yes   Do you have difficulty bathing yourself, getting dressed or grooming yourself? No   Do you have difficulty using the toilet? No   Do you have difficulty moving around from place to place? Yes   Do you have trouble with steps or getting out of a bed or a chair? Yes   Current Diet Unhealthy Diet   Dental Exam Not up to date   Eye Exam Not up to date   Exercise (times per week) 0 times per week   Current Exercises Include No Regular Exercise   Do you need help using the phone?  No   Are you deaf or do you have serious difficulty hearing?  Yes   Do you need help to go to places out of walking distance? Yes   Do you need help shopping? No   Do you need help preparing meals?  Yes   Do you need help with housework?  Yes   Do you need help with laundry? Yes   Do you need help taking your medications? No   Do you need help managing money? No   Do you ever drive or ride in a car without wearing a seat belt? Yes   Have you felt unusual stress, anger or loneliness in the last month? No   Who do you live with? Alone   If you need help, do you have trouble finding someone available to you? Yes   Do you have difficulty concentrating, remembering or making decisions? Yes       Age-appropriate Screening Schedule:  Refer to the list below for future screening recommendations based on patient's age, sex and/or medical conditions. Orders for these recommended tests are listed in the plan section. The patient has been provided with a written plan.    Health Maintenance   Topic Date Due    Hepatitis B (1 of 3 - 3-dose series) Never done    COVID-19 Vaccine (1) Never done    DIABETIC EYE EXAM  2020    HEMOGLOBIN A1C  2023    URINE MICROALBUMIN   "06/09/2023    BMI FOLLOWUP  06/09/2023    COLORECTAL CANCER SCREENING  08/04/2023    INFLUENZA VACCINE  10/01/2023    LIPID PANEL  11/08/2023    ANNUAL WELLNESS VISIT  09/07/2024    DIABETIC FOOT EXAM  09/07/2024    TDAP/TD VACCINES (4 - Td or Tdap) 08/14/2033    HEPATITIS C SCREENING  Completed    Pneumococcal Vaccine 0-64  Completed    ZOSTER VACCINE  Completed                  CMS Preventative Services Quick Reference  Risk Factors Identified During Encounter  Inactivity/Sedentary: Patient was advised to exercise at least 150 minutes a week per CDC recommendations.  Tobacco Use/Dependance Risk (use dotphrase .tobaccocessation for documentation)  The above risks/problems have been discussed with the patient.  Pertinent information has been shared with the patient in the After Visit Summary.  An After Visit Summary and PPPS were made available to the patient.    Follow Up:   Next Medicare Wellness visit to be scheduled in 1 year.       Additional E&M Note during same encounter follows:  Patient has multiple medical problems which are significant and separately identifiable that require additional work above and beyond the Medicare Wellness Visit.      Chief Complaint  Medicare Wellness-subsequent (Medicare wellness )    Subjective        HPI  Robby Hernandes is also being seen today for chronic nausea,. diabetes    Review of Systems   Constitutional:  Negative for fatigue and fever.   HENT:  Negative for sore throat.    Eyes:  Negative for visual disturbance.   Gastrointestinal:  Positive for nausea. Negative for abdominal pain, diarrhea and vomiting.   Neurological:  Negative for light-headedness.     Objective   Vital Signs:  /80   Pulse 100   Temp 97.5 °F (36.4 °C)   Ht 175.3 cm (69\")   Wt 93.8 kg (206 lb 12.8 oz)   SpO2 97%   BMI 30.54 kg/m²     Physical Exam  Vitals reviewed.   Constitutional:       Appearance: Normal appearance. He is well-developed.   HENT:      Head: Normocephalic and atraumatic.    "   Right Ear: External ear normal.      Left Ear: External ear normal.      Mouth/Throat:      Pharynx: No oropharyngeal exudate.   Eyes:      Conjunctiva/sclera: Conjunctivae normal.      Pupils: Pupils are equal, round, and reactive to light.   Cardiovascular:      Rate and Rhythm: Normal rate and regular rhythm.      Pulses: Normal pulses.           Dorsalis pedis pulses are 2+ on the right side and 2+ on the left side.      Heart sounds: Normal heart sounds. No murmur heard.    No friction rub. No gallop.   Pulmonary:      Effort: Pulmonary effort is normal.      Breath sounds: Normal breath sounds. No wheezing or rhonchi.   Abdominal:      General: Abdomen is flat. Bowel sounds are normal. There is no distension.      Palpations: Abdomen is soft. There is no mass.      Tenderness: There is no abdominal tenderness. There is no guarding or rebound.      Hernia: No hernia is present.   Musculoskeletal:         General: Normal range of motion.      Right foot: Normal range of motion. No deformity, bunion, Charcot foot, foot drop or prominent metatarsal heads.      Left foot: Normal range of motion. No deformity, bunion, Charcot foot, foot drop or prominent metatarsal heads.   Feet:      Right foot:      Protective Sensation: 3 sites tested.  3 sites sensed.      Skin integrity: Skin integrity normal. Callus present. No ulcer or blister.      Toenail Condition: Right toenails are normal.      Left foot:      Protective Sensation: 3 sites tested.  3 sites sensed.      Skin integrity: Skin integrity normal. No ulcer, blister or callus.      Toenail Condition: Left toenails are normal.      Comments:      Skin:     General: Skin is warm and dry.      Capillary Refill: Capillary refill takes less than 2 seconds.   Neurological:      General: No focal deficit present.      Mental Status: He is alert and oriented to person, place, and time.      Cranial Nerves: No cranial nerve deficit.   Psychiatric:         Mood and  Affect: Mood and affect normal.         Behavior: Behavior normal.         Thought Content: Thought content normal.         Judgment: Judgment normal.        The following data was reviewed by: Wei Lauren MD on 09/07/2023:  CMP          3/16/2023    09:03   CMP   Glucose 134    BUN 17    Creatinine 0.92    EGFR 98.9    Sodium 137    Potassium 4.2    Chloride 96    Calcium 10.2    Total Protein 7.8    Albumin 5.0    Globulin 2.8    Total Bilirubin 0.7    Alkaline Phosphatase 49    AST (SGOT) 27    ALT (SGPT) 23    Albumin/Globulin Ratio 1.8    BUN/Creatinine Ratio 18.5    Anion Gap 12.9      CBC          3/16/2023    09:03   CBC   WBC 9.00    RBC 5.34    Hemoglobin 17.8    Hematocrit 50.1    MCV 93.8    MCH 33.3    MCHC 35.5    RDW 12.5    Platelets 224      Lipid Panel          11/8/2022    09:27   Lipid Panel   Total Cholesterol 199    Triglycerides 136    HDL Cholesterol 46    VLDL Cholesterol 24    LDL Cholesterol  129    LDL/HDL Ratio 2.73      TSH          11/8/2022    09:27   TSH   TSH 0.833      Most Recent A1C          11/8/2022    09:27   HGBA1C Most Recent   Hemoglobin A1C 6.10                     Assessment and Plan   Diagnoses and all orders for this visit:    1. Medicare annual wellness visit, subsequent (Primary)    2. Fatty liver  -     Ambulatory Referral to Gastroenterology    3. Chronic nausea  -     Ambulatory Referral to Gastroenterology  -     H. Pylori Antigen, Stool - Stool, Per Rectum    4. Elevated liver enzymes  -     Ambulatory Referral to Gastroenterology    5. Screening PSA (prostate specific antigen)  -     PSA Screen    6. Type 2 diabetes mellitus with hyperglycemia, without long-term current use of insulin  -     CBC Auto Differential  -     Comprehensive Metabolic Panel  -     Hemoglobin A1c  -     Lipid Panel; Future  -     MicroAlbumin, Urine, Random - Urine, Clean Catch  -     aspirin (Aspirin Low Dose) 81 MG EC tablet; Take 1 tablet by mouth Daily.  Dispense: 30 tablet;  Refill: 5  -     dapagliflozin Propanediol (Farxiga) 10 MG tablet; Take 10 mg by mouth Daily.  Dispense: 30 tablet; Refill: 5  -     metFORMIN (GLUCOPHAGE) 500 MG tablet; Take 2 tablets by mouth 2 (Two) Times a Day With Meals.  Dispense: 120 tablet; Refill: 5  -     SITagliptin (Januvia) 100 MG tablet; Take 1 tablet by mouth Daily.  Dispense: 30 tablet; Refill: 5  -     Ambulatory Referral to Podiatry    7. Primary hypertension  -     TSH+Free T4  -     hydroCHLOROthiazide (HYDRODIURIL) 25 MG tablet; Take 1 tablet by mouth Daily.  Dispense: 30 tablet; Refill: 5  -     losartan (COZAAR) 100 MG tablet; Take 1 tablet by mouth Daily.  Dispense: 30 tablet; Refill: 5    8. Mixed hyperlipidemia  -     atorvastatin (LIPITOR) 40 MG tablet; Take 1 tablet by mouth Daily.  Dispense: 30 tablet; Refill: 5  -     fenofibrate (TRICOR) 145 MG tablet; Take 1 tablet by mouth every night at bedtime.  Dispense: 30 tablet; Refill: 5    9. Nausea and vomiting, unspecified vomiting type  -     pantoprazole (PROTONIX) 40 MG EC tablet; Take 1 tablet by mouth Daily.  Dispense: 30 tablet; Refill: 5    10. Anxiety and depression  -     venlafaxine XR (EFFEXOR-XR) 150 MG 24 hr capsule; Take 1 capsule by mouth Daily.  Dispense: 30 capsule; Refill: 5    11. Housing insecurity  -     Ambulatory Referral to Social Care Services (Amb Case Mgmt)    12. Food insecurity  -     Ambulatory Referral to Social Care Services (Amb Case Mgmt)    13. Pre-ulcerative corn or callous  -     Ambulatory Referral to Podiatry             Follow Up   Return in about 6 months (around 3/7/2024) for Recheck.  Patient was given instructions and counseling regarding his condition or for health maintenance advice. Please see specific information pulled into the AVS if appropriate.

## 2023-09-08 ENCOUNTER — CLINICAL SUPPORT (OUTPATIENT)
Dept: FAMILY MEDICINE CLINIC | Facility: CLINIC | Age: 53
End: 2023-09-08
Payer: MEDICARE

## 2023-09-08 ENCOUNTER — REFERRAL TRIAGE (OUTPATIENT)
Dept: CASE MANAGEMENT | Facility: OTHER | Age: 53
End: 2023-09-08
Payer: MEDICARE

## 2023-09-08 DIAGNOSIS — E11.65 TYPE 2 DIABETES MELLITUS WITH HYPERGLYCEMIA, WITHOUT LONG-TERM CURRENT USE OF INSULIN: ICD-10-CM

## 2023-09-08 LAB
ALBUMIN SERPL-MCNC: 4.3 G/DL (ref 3.5–5.2)
ALBUMIN UR-MCNC: 1.4 MG/DL
ALBUMIN/GLOB SERPL: 1.4 G/DL
ALP SERPL-CCNC: 41 U/L (ref 39–117)
ALT SERPL W P-5'-P-CCNC: 12 U/L (ref 1–41)
ANION GAP SERPL CALCULATED.3IONS-SCNC: 11 MMOL/L (ref 5–15)
AST SERPL-CCNC: 21 U/L (ref 1–40)
BASOPHILS # BLD AUTO: 0.08 10*3/MM3 (ref 0–0.2)
BASOPHILS NFR BLD AUTO: 1.1 % (ref 0–1.5)
BILIRUB SERPL-MCNC: 0.5 MG/DL (ref 0–1.2)
BUN SERPL-MCNC: 8 MG/DL (ref 6–20)
BUN/CREAT SERPL: 11.9 (ref 7–25)
CALCIUM SPEC-SCNC: 10 MG/DL (ref 8.6–10.5)
CHLORIDE SERPL-SCNC: 106 MMOL/L (ref 98–107)
CHOLEST SERPL-MCNC: 244 MG/DL (ref 0–200)
CO2 SERPL-SCNC: 26 MMOL/L (ref 22–29)
CREAT SERPL-MCNC: 0.67 MG/DL (ref 0.76–1.27)
DEPRECATED RDW RBC AUTO: 43.6 FL (ref 37–54)
EGFRCR SERPLBLD CKD-EPI 2021: 111 ML/MIN/1.73
EOSINOPHIL # BLD AUTO: 0.26 10*3/MM3 (ref 0–0.4)
EOSINOPHIL NFR BLD AUTO: 3.4 % (ref 0.3–6.2)
ERYTHROCYTE [DISTWIDTH] IN BLOOD BY AUTOMATED COUNT: 12.4 % (ref 12.3–15.4)
GLOBULIN UR ELPH-MCNC: 3 GM/DL
GLUCOSE SERPL-MCNC: 101 MG/DL (ref 65–99)
HBA1C MFR BLD: 5.9 % (ref 4.8–5.6)
HCT VFR BLD AUTO: 55 % (ref 37.5–51)
HDLC SERPL-MCNC: 50 MG/DL (ref 40–60)
HGB BLD-MCNC: 19 G/DL (ref 13–17.7)
IMM GRANULOCYTES # BLD AUTO: 0.02 10*3/MM3 (ref 0–0.05)
IMM GRANULOCYTES NFR BLD AUTO: 0.3 % (ref 0–0.5)
LDLC SERPL CALC-MCNC: 170 MG/DL (ref 0–100)
LDLC/HDLC SERPL: 3.34 {RATIO}
LYMPHOCYTES # BLD AUTO: 1.79 10*3/MM3 (ref 0.7–3.1)
LYMPHOCYTES NFR BLD AUTO: 23.7 % (ref 19.6–45.3)
MCH RBC QN AUTO: 33.2 PG (ref 26.6–33)
MCHC RBC AUTO-ENTMCNC: 34.5 G/DL (ref 31.5–35.7)
MCV RBC AUTO: 96.2 FL (ref 79–97)
MONOCYTES # BLD AUTO: 0.73 10*3/MM3 (ref 0.1–0.9)
MONOCYTES NFR BLD AUTO: 9.7 % (ref 5–12)
NEUTROPHILS NFR BLD AUTO: 4.66 10*3/MM3 (ref 1.7–7)
NEUTROPHILS NFR BLD AUTO: 61.8 % (ref 42.7–76)
NRBC BLD AUTO-RTO: 0 /100 WBC (ref 0–0.2)
PLATELET # BLD AUTO: 231 10*3/MM3 (ref 140–450)
PMV BLD AUTO: 9.9 FL (ref 6–12)
POTASSIUM SERPL-SCNC: 4.2 MMOL/L (ref 3.5–5.2)
PROT SERPL-MCNC: 7.3 G/DL (ref 6–8.5)
PSA SERPL-MCNC: 0.78 NG/ML (ref 0–4)
RBC # BLD AUTO: 5.72 10*6/MM3 (ref 4.14–5.8)
SODIUM SERPL-SCNC: 143 MMOL/L (ref 136–145)
T4 FREE SERPL-MCNC: 1.15 NG/DL (ref 0.93–1.7)
TRIGL SERPL-MCNC: 135 MG/DL (ref 0–150)
TSH SERPL DL<=0.05 MIU/L-ACNC: 1.26 UIU/ML (ref 0.27–4.2)
VLDLC SERPL-MCNC: 24 MG/DL (ref 5–40)
WBC NRBC COR # BLD: 7.54 10*3/MM3 (ref 3.4–10.8)

## 2023-09-08 PROCEDURE — G0103 PSA SCREENING: HCPCS | Performed by: FAMILY MEDICINE

## 2023-09-08 PROCEDURE — 80061 LIPID PANEL: CPT | Performed by: FAMILY MEDICINE

## 2023-09-08 PROCEDURE — 84439 ASSAY OF FREE THYROXINE: CPT | Performed by: FAMILY MEDICINE

## 2023-09-08 PROCEDURE — 83036 HEMOGLOBIN GLYCOSYLATED A1C: CPT | Performed by: FAMILY MEDICINE

## 2023-09-08 PROCEDURE — 84443 ASSAY THYROID STIM HORMONE: CPT | Performed by: FAMILY MEDICINE

## 2023-09-08 PROCEDURE — 82043 UR ALBUMIN QUANTITATIVE: CPT | Performed by: FAMILY MEDICINE

## 2023-09-08 PROCEDURE — 85025 COMPLETE CBC W/AUTO DIFF WBC: CPT | Performed by: FAMILY MEDICINE

## 2023-09-08 PROCEDURE — 80053 COMPREHEN METABOLIC PANEL: CPT | Performed by: FAMILY MEDICINE

## 2023-09-08 PROCEDURE — 36415 COLL VENOUS BLD VENIPUNCTURE: CPT | Performed by: FAMILY MEDICINE

## 2023-09-08 NOTE — PROGRESS NOTES
..  Venipuncture Blood Specimen Collection  Venipuncture performed in LT arm by Lorena Avelar MA with good hemostasis. Patient tolerated the procedure well without complications.   09/08/23   Lorena Avelar MA

## 2023-09-08 NOTE — PROGRESS NOTES
Labs not showing any significant changes.  Continue treatments. Watch diet and exercise.  Make sure you are taking a baby aspirin daily.

## 2023-09-12 ENCOUNTER — PATIENT OUTREACH (OUTPATIENT)
Dept: CASE MANAGEMENT | Facility: OTHER | Age: 53
End: 2023-09-12
Payer: MEDICARE

## 2023-09-12 NOTE — OUTREACH NOTE
[facility] via phone and left voicemail with no call back regarding resources.    Susanna BRAGA -   Ambulatory Case Management    9/12/2023, 11:11 EDT

## 2023-11-07 ENCOUNTER — TELEPHONE (OUTPATIENT)
Dept: GASTROENTEROLOGY | Facility: CLINIC | Age: 53
End: 2023-11-07

## 2023-11-07 NOTE — TELEPHONE ENCOUNTER
Attempted to contact Robby Hernandes 1970 regarding the appointment no show with [facility] on 11/07/23. Patient is aware that there is a 24-hour cancellation policy and understands that a no-show letter will be mailed to them at the address on file.The patient did not answer so I left a voicemail requesting a call back.   no

## 2023-11-17 DIAGNOSIS — E11.65 TYPE 2 DIABETES MELLITUS WITH HYPERGLYCEMIA, WITHOUT LONG-TERM CURRENT USE OF INSULIN: Primary | ICD-10-CM

## 2024-03-18 ENCOUNTER — OFFICE VISIT (OUTPATIENT)
Dept: FAMILY MEDICINE CLINIC | Facility: CLINIC | Age: 54
End: 2024-03-18
Payer: MEDICARE

## 2024-03-18 VITALS
WEIGHT: 195.2 LBS | DIASTOLIC BLOOD PRESSURE: 80 MMHG | HEART RATE: 99 BPM | OXYGEN SATURATION: 98 % | TEMPERATURE: 97.1 F | BODY MASS INDEX: 28.83 KG/M2 | SYSTOLIC BLOOD PRESSURE: 138 MMHG

## 2024-03-18 DIAGNOSIS — M54.2 NECK PAIN: ICD-10-CM

## 2024-03-18 DIAGNOSIS — I10 PRIMARY HYPERTENSION: ICD-10-CM

## 2024-03-18 DIAGNOSIS — F32.A ANXIETY AND DEPRESSION: ICD-10-CM

## 2024-03-18 DIAGNOSIS — R11.0 CHRONIC NAUSEA: ICD-10-CM

## 2024-03-18 DIAGNOSIS — Z87.891 PERSONAL HISTORY OF NICOTINE DEPENDENCE: ICD-10-CM

## 2024-03-18 DIAGNOSIS — M25.531 PAIN IN BOTH WRISTS: ICD-10-CM

## 2024-03-18 DIAGNOSIS — K21.9 GASTROESOPHAGEAL REFLUX DISEASE, UNSPECIFIED WHETHER ESOPHAGITIS PRESENT: ICD-10-CM

## 2024-03-18 DIAGNOSIS — E78.2 MIXED HYPERLIPIDEMIA: ICD-10-CM

## 2024-03-18 DIAGNOSIS — G47.00 INSOMNIA, UNSPECIFIED TYPE: ICD-10-CM

## 2024-03-18 DIAGNOSIS — F41.9 ANXIETY AND DEPRESSION: ICD-10-CM

## 2024-03-18 DIAGNOSIS — Z12.2 SCREENING FOR LUNG CANCER: ICD-10-CM

## 2024-03-18 DIAGNOSIS — R11.2 NAUSEA AND VOMITING, UNSPECIFIED VOMITING TYPE: ICD-10-CM

## 2024-03-18 DIAGNOSIS — M25.532 PAIN IN BOTH WRISTS: ICD-10-CM

## 2024-03-18 DIAGNOSIS — R53.83 FATIGUE, UNSPECIFIED TYPE: ICD-10-CM

## 2024-03-18 DIAGNOSIS — E11.65 TYPE 2 DIABETES MELLITUS WITH HYPERGLYCEMIA, WITHOUT LONG-TERM CURRENT USE OF INSULIN: Primary | ICD-10-CM

## 2024-03-18 DIAGNOSIS — G47.30 SLEEP APNEA, UNSPECIFIED TYPE: ICD-10-CM

## 2024-03-18 LAB
ALBUMIN SERPL-MCNC: 4.9 G/DL (ref 3.5–5.2)
ALBUMIN UR-MCNC: <1.2 MG/DL
ALBUMIN/GLOB SERPL: 1.6 G/DL
ALP SERPL-CCNC: 60 U/L (ref 39–117)
ALT SERPL W P-5'-P-CCNC: 20 U/L (ref 1–41)
ANION GAP SERPL CALCULATED.3IONS-SCNC: 12 MMOL/L (ref 5–15)
AST SERPL-CCNC: 18 U/L (ref 1–40)
BASOPHILS # BLD AUTO: 0.07 10*3/MM3 (ref 0–0.2)
BASOPHILS NFR BLD AUTO: 0.7 % (ref 0–1.5)
BILIRUB SERPL-MCNC: 0.5 MG/DL (ref 0–1.2)
BUN SERPL-MCNC: 22 MG/DL (ref 6–20)
BUN/CREAT SERPL: 21.4 (ref 7–25)
CALCIUM SPEC-SCNC: 10.9 MG/DL (ref 8.6–10.5)
CHLORIDE SERPL-SCNC: 95 MMOL/L (ref 98–107)
CHOLEST SERPL-MCNC: 198 MG/DL (ref 0–200)
CO2 SERPL-SCNC: 27 MMOL/L (ref 22–29)
CREAT SERPL-MCNC: 1.03 MG/DL (ref 0.76–1.27)
DEPRECATED RDW RBC AUTO: 47.5 FL (ref 37–54)
EGFRCR SERPLBLD CKD-EPI 2021: 85.8 ML/MIN/1.73
EOSINOPHIL # BLD AUTO: 0.11 10*3/MM3 (ref 0–0.4)
EOSINOPHIL NFR BLD AUTO: 1.1 % (ref 0.3–6.2)
ERYTHROCYTE [DISTWIDTH] IN BLOOD BY AUTOMATED COUNT: 13.7 % (ref 12.3–15.4)
FOLATE SERPL-MCNC: 9.13 NG/ML (ref 4.78–24.2)
GLOBULIN UR ELPH-MCNC: 3 GM/DL
GLUCOSE SERPL-MCNC: 104 MG/DL (ref 65–99)
HBA1C MFR BLD: 5.7 % (ref 4.8–5.6)
HCT VFR BLD AUTO: 60.2 % (ref 37.5–51)
HDLC SERPL-MCNC: 55 MG/DL (ref 40–60)
HGB BLD-MCNC: 20.8 G/DL (ref 13–17.7)
LDLC SERPL CALC-MCNC: 116 MG/DL (ref 0–100)
LDLC/HDLC SERPL: 2.04 {RATIO}
LYMPHOCYTES # BLD AUTO: 1.72 10*3/MM3 (ref 0.7–3.1)
LYMPHOCYTES NFR BLD AUTO: 17.1 % (ref 19.6–45.3)
MCH RBC QN AUTO: 33.3 PG (ref 26.6–33)
MCHC RBC AUTO-ENTMCNC: 34.6 G/DL (ref 31.5–35.7)
MCV RBC AUTO: 96.3 FL (ref 79–97)
MONOCYTES # BLD AUTO: 0.84 10*3/MM3 (ref 0.1–0.9)
MONOCYTES NFR BLD AUTO: 8.4 % (ref 5–12)
NEUTROPHILS NFR BLD AUTO: 7.24 10*3/MM3 (ref 1.7–7)
NEUTROPHILS NFR BLD AUTO: 72.1 % (ref 42.7–76)
PLAT MORPH BLD: NORMAL
PLATELET # BLD AUTO: 192 10*3/MM3 (ref 140–450)
PMV BLD AUTO: 10.7 FL (ref 6–12)
POTASSIUM SERPL-SCNC: 4.5 MMOL/L (ref 3.5–5.2)
PROT SERPL-MCNC: 7.9 G/DL (ref 6–8.5)
RBC # BLD AUTO: 6.25 10*6/MM3 (ref 4.14–5.8)
RBC MORPH BLD: NORMAL
SODIUM SERPL-SCNC: 134 MMOL/L (ref 136–145)
T4 FREE SERPL-MCNC: 1.22 NG/DL (ref 0.93–1.7)
TRIGL SERPL-MCNC: 154 MG/DL (ref 0–150)
TSH SERPL DL<=0.05 MIU/L-ACNC: 2.63 UIU/ML (ref 0.27–4.2)
VIT B12 BLD-MCNC: 338 PG/ML (ref 211–946)
VLDLC SERPL-MCNC: 27 MG/DL (ref 5–40)
WBC MORPH BLD: NORMAL
WBC NRBC COR # BLD AUTO: 10.04 10*3/MM3 (ref 3.4–10.8)

## 2024-03-18 PROCEDURE — 3079F DIAST BP 80-89 MM HG: CPT | Performed by: FAMILY MEDICINE

## 2024-03-18 PROCEDURE — 84439 ASSAY OF FREE THYROXINE: CPT | Performed by: FAMILY MEDICINE

## 2024-03-18 PROCEDURE — 82043 UR ALBUMIN QUANTITATIVE: CPT | Performed by: FAMILY MEDICINE

## 2024-03-18 PROCEDURE — 82746 ASSAY OF FOLIC ACID SERUM: CPT | Performed by: FAMILY MEDICINE

## 2024-03-18 PROCEDURE — 3075F SYST BP GE 130 - 139MM HG: CPT | Performed by: FAMILY MEDICINE

## 2024-03-18 PROCEDURE — 84443 ASSAY THYROID STIM HORMONE: CPT | Performed by: FAMILY MEDICINE

## 2024-03-18 PROCEDURE — 85007 BL SMEAR W/DIFF WBC COUNT: CPT | Performed by: FAMILY MEDICINE

## 2024-03-18 PROCEDURE — 1159F MED LIST DOCD IN RCRD: CPT | Performed by: FAMILY MEDICINE

## 2024-03-18 PROCEDURE — 85025 COMPLETE CBC W/AUTO DIFF WBC: CPT | Performed by: FAMILY MEDICINE

## 2024-03-18 PROCEDURE — 83036 HEMOGLOBIN GLYCOSYLATED A1C: CPT | Performed by: FAMILY MEDICINE

## 2024-03-18 PROCEDURE — 82607 VITAMIN B-12: CPT | Performed by: FAMILY MEDICINE

## 2024-03-18 PROCEDURE — 80061 LIPID PANEL: CPT | Performed by: FAMILY MEDICINE

## 2024-03-18 PROCEDURE — 1160F RVW MEDS BY RX/DR IN RCRD: CPT | Performed by: FAMILY MEDICINE

## 2024-03-18 PROCEDURE — 80053 COMPREHEN METABOLIC PANEL: CPT | Performed by: FAMILY MEDICINE

## 2024-03-18 RX ORDER — ASPIRIN 81 MG/1
81 TABLET ORAL DAILY
Qty: 30 TABLET | Refills: 5 | Status: SHIPPED | OUTPATIENT
Start: 2024-03-18 | End: 2024-03-18

## 2024-03-18 RX ORDER — FENOFIBRATE 145 MG/1
145 TABLET, COATED ORAL
Qty: 30 TABLET | Refills: 5 | Status: SHIPPED | OUTPATIENT
Start: 2024-03-18 | End: 2024-03-18

## 2024-03-18 RX ORDER — FENOFIBRATE 145 MG/1
145 TABLET, COATED ORAL
Qty: 90 TABLET | Refills: 1 | Status: SHIPPED | OUTPATIENT
Start: 2024-03-18

## 2024-03-18 RX ORDER — PANTOPRAZOLE SODIUM 40 MG/1
40 TABLET, DELAYED RELEASE ORAL DAILY
Qty: 90 TABLET | Refills: 1 | Status: SHIPPED | OUTPATIENT
Start: 2024-03-18

## 2024-03-18 RX ORDER — HYDROCHLOROTHIAZIDE 25 MG/1
25 TABLET ORAL DAILY
Qty: 90 TABLET | Refills: 1 | Status: SHIPPED | OUTPATIENT
Start: 2024-03-18

## 2024-03-18 RX ORDER — TRAZODONE HYDROCHLORIDE 50 MG/1
50 TABLET ORAL NIGHTLY
Qty: 30 TABLET | Refills: 1 | Status: SHIPPED | OUTPATIENT
Start: 2024-03-18

## 2024-03-18 RX ORDER — ASPIRIN 81 MG/1
81 TABLET ORAL DAILY
Qty: 90 TABLET | Refills: 1 | Status: SHIPPED | OUTPATIENT
Start: 2024-03-18

## 2024-03-18 RX ORDER — CELECOXIB 200 MG/1
200 CAPSULE ORAL DAILY
Qty: 90 CAPSULE | Refills: 1 | Status: SHIPPED | OUTPATIENT
Start: 2024-03-18

## 2024-03-18 RX ORDER — BACLOFEN 10 MG/1
10 TABLET ORAL 3 TIMES DAILY PRN
Qty: 270 TABLET | Refills: 1 | Status: SHIPPED | OUTPATIENT
Start: 2024-03-18

## 2024-03-18 RX ORDER — LOSARTAN POTASSIUM 100 MG/1
100 TABLET ORAL DAILY
Qty: 90 TABLET | Refills: 1 | Status: SHIPPED | OUTPATIENT
Start: 2024-03-18

## 2024-03-18 RX ORDER — ATORVASTATIN CALCIUM 40 MG/1
40 TABLET, FILM COATED ORAL DAILY
Qty: 30 TABLET | Refills: 5 | Status: SHIPPED | OUTPATIENT
Start: 2024-03-18 | End: 2024-03-18

## 2024-03-18 RX ORDER — BACLOFEN 10 MG/1
10 TABLET ORAL 3 TIMES DAILY PRN
Qty: 30 TABLET | Refills: 5 | Status: SHIPPED | OUTPATIENT
Start: 2024-03-18 | End: 2024-03-18

## 2024-03-18 RX ORDER — SUCRALFATE 1 G/1
1 TABLET ORAL 4 TIMES DAILY
Qty: 120 TABLET | Refills: 1 | Status: SHIPPED | OUTPATIENT
Start: 2024-03-18

## 2024-03-18 RX ORDER — PANTOPRAZOLE SODIUM 40 MG/1
40 TABLET, DELAYED RELEASE ORAL DAILY
Qty: 30 TABLET | Refills: 5 | Status: SHIPPED | OUTPATIENT
Start: 2024-03-18 | End: 2024-03-18

## 2024-03-18 RX ORDER — VENLAFAXINE HYDROCHLORIDE 150 MG/1
150 CAPSULE, EXTENDED RELEASE ORAL DAILY
Qty: 90 CAPSULE | Refills: 1 | Status: SHIPPED | OUTPATIENT
Start: 2024-03-18

## 2024-03-18 RX ORDER — LOSARTAN POTASSIUM 100 MG/1
100 TABLET ORAL DAILY
Qty: 30 TABLET | Refills: 5 | Status: SHIPPED | OUTPATIENT
Start: 2024-03-18 | End: 2024-03-18

## 2024-03-18 RX ORDER — VENLAFAXINE HYDROCHLORIDE 150 MG/1
150 CAPSULE, EXTENDED RELEASE ORAL DAILY
Qty: 30 CAPSULE | Refills: 5 | Status: SHIPPED | OUTPATIENT
Start: 2024-03-18 | End: 2024-03-18

## 2024-03-18 RX ORDER — HYDROCHLOROTHIAZIDE 25 MG/1
25 TABLET ORAL DAILY
Qty: 30 TABLET | Refills: 5 | Status: SHIPPED | OUTPATIENT
Start: 2024-03-18 | End: 2024-03-18

## 2024-03-18 RX ORDER — DAPAGLIFLOZIN 10 MG/1
10 TABLET, FILM COATED ORAL DAILY
Qty: 30 TABLET | Refills: 5 | Status: SHIPPED | OUTPATIENT
Start: 2024-03-18 | End: 2024-03-18

## 2024-03-18 RX ORDER — CELECOXIB 200 MG/1
200 CAPSULE ORAL DAILY
Qty: 30 CAPSULE | Refills: 5 | Status: SHIPPED | OUTPATIENT
Start: 2024-03-18 | End: 2024-03-18

## 2024-03-18 RX ORDER — ATORVASTATIN CALCIUM 40 MG/1
40 TABLET, FILM COATED ORAL DAILY
Qty: 90 TABLET | Refills: 1 | Status: SHIPPED | OUTPATIENT
Start: 2024-03-18

## 2024-03-18 RX ORDER — DAPAGLIFLOZIN 10 MG/1
10 TABLET, FILM COATED ORAL DAILY
Qty: 90 TABLET | Refills: 1 | Status: SHIPPED | OUTPATIENT
Start: 2024-03-18

## 2024-03-18 NOTE — ADDENDUM NOTE
Addended by: DENISE RAMIREZ on: 3/18/2024 08:46 AM     Modules accepted: Orders     Albendazole Pregnancy And Lactation Text: This medication is Pregnancy Category C and it isn't known if it is safe during pregnancy. It is also excreted in breast milk.

## 2024-03-18 NOTE — PROGRESS NOTES
Chief Complaint  Insomnia (/), Nausea, Hypertension, Hyperlipidemia, and Diabetes    Subjective          Robby Hernandes presents to [facility]  History of Present Illness  Pt has chronic nausea- on protonix- needs EGD  Insomnia  This is a chronic problem. The current episode started more than 1 year ago. The problem occurs constantly. The problem has been unchanged. Pertinent negatives include no chest pain, fatigue, headaches, myalgias, neck pain, visual change or weakness. Nothing aggravates the symptoms. He has tried nothing for the symptoms.   Hypertension  This is a chronic problem. The current episode started more than 1 year ago. The problem has been improved since onset. The problem is controlled. Pertinent negatives include no anxiety, blurred vision, chest pain, headaches, malaise/fatigue, neck pain, orthopnea, palpitations, peripheral edema, PND, shortness of breath or sweats. There are no associated agents to hypertension. Risk factors for coronary artery disease include diabetes mellitus, dyslipidemia, family history and male gender. Current antihypertension treatment includes angiotensin blockers and diuretics. The current treatment provides significant improvement. There are no compliance problems.    Hyperlipidemia  This is a chronic problem. The current episode started more than 1 year ago. The problem is controlled. Recent lipid tests were reviewed and are variable. There are no known factors aggravating his hyperlipidemia. Pertinent negatives include no chest pain, focal sensory loss, focal weakness, leg pain, myalgias or shortness of breath. Current antihyperlipidemic treatment includes statins. The current treatment provides significant improvement of lipids. There are no compliance problems.    Diabetes  He presents for his follow-up diabetic visit. He has type 2 diabetes mellitus. Pertinent negatives for hypoglycemia include no confusion, dizziness, headaches,  hunger, mood changes, nervousness/anxiousness, pallor, seizures, sleepiness, speech difficulty, sweats or tremors. Pertinent negatives for diabetes include no blurred vision, no chest pain, no fatigue, no foot paresthesias, no foot ulcerations, no polydipsia, no polyphagia, no polyuria, no visual change, no weakness and no weight loss. Symptoms are stable. Current diabetic treatment includes oral agent (triple therapy). He is following a generally healthy diet. He participates in exercise intermittently. An ACE inhibitor/angiotensin II receptor blocker is being taken.  Eye exam is not current.                Objective   Allergies   Allergen Reactions    Lyrica [Pregabalin] Hallucinations    Tree Extract Other (See Comments)     Runny nose, sneezing, congestion     Immunization History   Administered Date(s) Administered    Flu Vaccine Split Quad 09/25/2017, 10/05/2017, 09/03/2018, 12/05/2019    Fluzone (or Fluarix & Flulaval for VFC) >6mos 09/25/2017, 12/07/2021    Hepatitis A 04/11/2019    Influenza Inj MDCK Preserative Free 11/02/2015    Influenza Injectable Mdck Pf Quad 10/20/2022    Influenza Quad Vaccine (Inpatient) 08/10/2016    Influenza, Unspecified 11/02/2015, 10/19/2022    Pneumococcal Conjugate 20-Valent (PCV20) 06/28/2022    Pneumococcal Polysaccharide (PPSV23) 08/10/2016, 12/05/2019    Shingrix 10/20/2022    Tdap 07/28/2017, 01/07/2019     Past Medical History:   Diagnosis Date    Anxiety and depression     Asthma     Chronic knee pain     RIGHT    Complex regional pain syndrome     LEFT ANKLE    COPD (chronic obstructive pulmonary disease)     Diabetes mellitus     Fracture of nasal septum 1987    CAN NOT BREATH OUT OF RIGHT NOSTRIL    Hiatal hernia     High cholesterol     Hip pain, chronic, left     History of heart attack     PER EKG DR MILIAN    History of kidney stones     [place] (hard of hearing)     Hypertension     Insomnia     Low back pain     Shoulder pain, left     Sleep apnea     DOES NOT USE  MACHINE    Tinnitus       Past Surgical History:   Procedure Laterality Date    ANKLE FUSION Left     CARPAL TUNNEL RELEASE Right     COLONOSCOPY      Cologaurd    CYSTOSCOPY BLADDER STONE LITHOTRIPSY      SPINAL CORD STIMULATOR IMPLANT      battery is on the right    TOTAL HIP ARTHROPLASTY Right     TOTAL HIP ARTHROPLASTY Left 2018    Procedure: LT TOTAL HIP ARTHROPLASTY ANTERIOR WITH HANA TABLE;  Surgeon: aDmeon Briggs MD;  Location: [facility] OR;  Service:       Social History     Socioeconomic History    Marital status: Single   Tobacco Use    Smoking status: Every Day     Current packs/day: 0.00     Average packs/day: 2.0 packs/day for 22.1 years (44.1 ttl pk-yrs)     Types: Cigarettes, Cigars     Start date: 1985     Last attempt to quit:      Years since quittin.2     Passive exposure: Past    Smokeless tobacco: Never   Vaping Use    Vaping status: Never Used   Substance and Sexual Activity    Alcohol use: Yes     Alcohol/week: 15.0 standard drinks of alcohol     Types: 15 Cans of beer per week    Drug use: Yes     Types: Marijuana    Sexual activity: Defer        Current Outpatient Medications:     aspirin (Aspirin Low Dose) 81 MG EC tablet, Take 1 tablet by mouth Daily., Disp: 90 tablet, Rfl: 1    atorvastatin (LIPITOR) 40 MG tablet, Take 1 tablet by mouth Daily., Disp: 90 tablet, Rfl: 1    baclofen (LIORESAL) 10 MG tablet, Take 1 tablet by mouth 3 (Three) Times a Day As Needed for Muscle Spasms., Disp: 270 tablet, Rfl: 1    Blood Glucose Monitoring Suppl (FreeStyle Lite) w/Device kit, 1 each Daily., Disp: 1 kit, Rfl: 0    celecoxib (CeleBREX) 200 MG capsule, Take 1 capsule by mouth Daily., Disp: 90 capsule, Rfl: 1    dapagliflozin Propanediol (Farxiga) 10 MG tablet, Take 10 mg by mouth Daily., Disp: 90 tablet, Rfl: 1    fenofibrate (TRICOR) 145 MG tablet, Take 1 tablet by mouth every night at bedtime., Disp: 90 tablet, Rfl: 1    glucose blood (FREESTYLE LITE) test strip, Test BG  daily, Disp: 100 each, Rfl: 3    hydroCHLOROthiazide 25 MG tablet, Take 1 tablet by mouth Daily., Disp: 90 tablet, Rfl: 1    Lancets (freestyle) lancets, Test BG daily, Disp: 100 each, Rfl: 3    losartan (COZAAR) 100 MG tablet, Take 1 tablet by mouth Daily., Disp: 90 tablet, Rfl: 1    metFORMIN (GLUCOPHAGE) 500 MG tablet, Take 2 tablets by mouth 2 (Two) Times a Day With Meals., Disp: 360 tablet, Rfl: 1    pantoprazole (PROTONIX) 40 MG EC tablet, Take 1 tablet by mouth Daily., Disp: 90 tablet, Rfl: 1    SITagliptin (Januvia) 100 MG tablet, Take 1 tablet by mouth Daily., Disp: 90 tablet, Rfl: 1    venlafaxine XR (EFFEXOR-XR) 150 MG 24 hr capsule, Take 1 capsule by mouth Daily., Disp: 90 capsule, Rfl: 1    sucralfate (Carafate) 1 g tablet, Take 1 tablet by mouth 4 (Four) Times a Day. For acid reflux and nauaea, Disp: 120 tablet, Rfl: 1    traZODone (DESYREL) 50 MG tablet, Take 1 tablet by mouth Every Night., Disp: 30 tablet, Rfl: 1   Family History   Problem Relation Age of Onset    Asthma Mother     COPD Mother     Heart disease Mother     Arthritis Mother     Fibromyalgia Mother     Heart disease Father     Malig Hyperthermia Neg Hx           Vital Signs:   Vitals:    03/18/24 0810   BP: 138/80   Pulse: 99   Temp: 97.1 °F (36.2 °C)   SpO2: 98%   Weight: 88.5 kg (195 lb 3.2 oz)       Review of Systems   Constitutional:  Negative for fatigue, malaise/fatigue and weight loss.   Eyes:  Negative for blurred vision.   Respiratory:  Negative for shortness of breath.    Cardiovascular:  Negative for chest pain, palpitations, orthopnea and PND.   Endocrine: Negative for polydipsia, polyphagia and polyuria.   Musculoskeletal:  Negative for myalgias and neck pain.   Skin:  Negative for pallor.   Neurological:  Negative for dizziness, tremors, focal weakness, seizures, speech difficulty, weakness and headaches.   Psychiatric/Behavioral:  Negative for confusion. The patient has insomnia. The patient is not nervous/anxious.        Physical Exam   Result Review :   The following data was reviewed by: Wei Lauren MD on 03/18/2024:  CMP          9/8/2023    08:03   CMP   Glucose 101    BUN 8    Creatinine 0.67    EGFR 111.0    Sodium 143    Potassium 4.2    Chloride 106    Calcium 10.0    Total Protein 7.3    Albumin 4.3    Globulin 3.0    Total Bilirubin 0.5    Alkaline Phosphatase 41    AST (SGOT) 21    ALT (SGPT) 12    Albumin/Globulin Ratio 1.4    BUN/Creatinine Ratio 11.9    Anion Gap 11.0      CBC          9/8/2023    08:03   CBC   WBC 7.54    RBC 5.72    Hemoglobin 19.0    Hematocrit 55.0    MCV 96.2    MCH 33.2    MCHC 34.5    RDW 12.4    Platelets 231      Lipid Panel          9/8/2023    08:03   Lipid Panel   Total Cholesterol 244    Triglycerides 135    HDL Cholesterol 50    VLDL Cholesterol 24    LDL Cholesterol  170    LDL/HDL Ratio 3.34      TSH          9/8/2023    08:03   TSH   TSH 1.260      Most Recent A1C          9/8/2023    08:03   HGBA1C Most Recent   Hemoglobin A1C 5.90      Microalbumin          9/8/2023    08:03   Microalbumin   Microalbumin, Urine 1.4                Assessment and Plan    Diagnoses and all orders for this visit:    1. Type 2 diabetes mellitus with hyperglycemia, without long-term current use of insulin (Primary)  -     Ambulatory Referral for Diabetic Eye Exam-Ophthalmology  -     Discontinue: aspirin (Aspirin Low Dose) 81 MG EC tablet; Take 1 tablet by mouth Daily.  Dispense: 30 tablet; Refill: 5  -     Discontinue: dapagliflozin Propanediol (Farxiga) 10 MG tablet; Take 10 mg by mouth Daily.  Dispense: 30 tablet; Refill: 5  -     Discontinue: metFORMIN (GLUCOPHAGE) 500 MG tablet; Take 2 tablets by mouth 2 (Two) Times a Day With Meals.  Dispense: 120 tablet; Refill: 5  -     Discontinue: SITagliptin (Januvia) 100 MG tablet; Take 1 tablet by mouth Daily.  Dispense: 30 tablet; Refill: 5  -     CBC Auto Differential  -     Comprehensive Metabolic Panel  -     Hemoglobin A1c  -     Lipid Panel  -      MicroAlbumin, Urine, Random - Urine, Clean Catch  -     aspirin (Aspirin Low Dose) 81 MG EC tablet; Take 1 tablet by mouth Daily.  Dispense: 90 tablet; Refill: 1  -     dapagliflozin Propanediol (Farxiga) 10 MG tablet; Take 10 mg by mouth Daily.  Dispense: 90 tablet; Refill: 1  -     metFORMIN (GLUCOPHAGE) 500 MG tablet; Take 2 tablets by mouth 2 (Two) Times a Day With Meals.  Dispense: 360 tablet; Refill: 1  -     SITagliptin (Januvia) 100 MG tablet; Take 1 tablet by mouth Daily.  Dispense: 90 tablet; Refill: 1    2. Chronic nausea  -     Cancel: Ambulatory Referral to Gastroenterology  -     H. Pylori Antigen, Stool - Stool, Per Rectum  -     sucralfate (Carafate) 1 g tablet; Take 1 tablet by mouth 4 (Four) Times a Day. For acid reflux and nauaea  Dispense: 120 tablet; Refill: 1  -     Ambulatory Referral to Gastroenterology    3. Gastroesophageal reflux disease, unspecified whether esophagitis present  -     sucralfate (Carafate) 1 g tablet; Take 1 tablet by mouth 4 (Four) Times a Day. For acid reflux and nauaea  Dispense: 120 tablet; Refill: 1    4. Sleep apnea, unspecified type  -     Ambulatory Referral to Sleep Medicine    5. Mixed hyperlipidemia  -     Discontinue: atorvastatin (LIPITOR) 40 MG tablet; Take 1 tablet by mouth Daily.  Dispense: 30 tablet; Refill: 5  -     Discontinue: fenofibrate (TRICOR) 145 MG tablet; Take 1 tablet by mouth every night at bedtime.  Dispense: 30 tablet; Refill: 5  -     atorvastatin (LIPITOR) 40 MG tablet; Take 1 tablet by mouth Daily.  Dispense: 90 tablet; Refill: 1  -     fenofibrate (TRICOR) 145 MG tablet; Take 1 tablet by mouth every night at bedtime.  Dispense: 90 tablet; Refill: 1    6. Neck pain  -     Discontinue: baclofen (LIORESAL) 10 MG tablet; Take 1 tablet by mouth 3 (Three) Times a Day As Needed for Muscle Spasms.  Dispense: 30 tablet; Refill: 5  -     baclofen (LIORESAL) 10 MG tablet; Take 1 tablet by mouth 3 (Three) Times a Day As Needed for Muscle Spasms.   Dispense: 270 tablet; Refill: 1    7. Pain in both wrists  -     Discontinue: celecoxib (CeleBREX) 200 MG capsule; Take 1 capsule by mouth Daily.  Dispense: 30 capsule; Refill: 5  -     celecoxib (CeleBREX) 200 MG capsule; Take 1 capsule by mouth Daily.  Dispense: 90 capsule; Refill: 1    8. Primary hypertension  -     Discontinue: hydroCHLOROthiazide 25 MG tablet; Take 1 tablet by mouth Daily.  Dispense: 30 tablet; Refill: 5  -     Discontinue: losartan (COZAAR) 100 MG tablet; Take 1 tablet by mouth Daily.  Dispense: 30 tablet; Refill: 5  -     hydroCHLOROthiazide 25 MG tablet; Take 1 tablet by mouth Daily.  Dispense: 90 tablet; Refill: 1  -     losartan (COZAAR) 100 MG tablet; Take 1 tablet by mouth Daily.  Dispense: 90 tablet; Refill: 1    9. Nausea and vomiting, unspecified vomiting type  -     Discontinue: pantoprazole (PROTONIX) 40 MG EC tablet; Take 1 tablet by mouth Daily.  Dispense: 30 tablet; Refill: 5  -     pantoprazole (PROTONIX) 40 MG EC tablet; Take 1 tablet by mouth Daily.  Dispense: 90 tablet; Refill: 1    10. Anxiety and depression  -     Discontinue: venlafaxine XR (EFFEXOR-XR) 150 MG 24 hr capsule; Take 1 capsule by mouth Daily.  Dispense: 30 capsule; Refill: 5  -     venlafaxine XR (EFFEXOR-XR) 150 MG 24 hr capsule; Take 1 capsule by mouth Daily.  Dispense: 90 capsule; Refill: 1    11. Insomnia, unspecified type  -     traZODone (DESYREL) 50 MG tablet; Take 1 tablet by mouth Every Night.  Dispense: 30 tablet; Refill: 1    12. Fatigue, unspecified type  -     TSH+Free T4  -     Vitamin B12 & Folate    13. Screening for lung cancer  -      CT Chest Low Dose Cancer Screening WO; Future    14. Personal history of nicotine dependence  -      CT Chest Low Dose Cancer Screening WO; Future            Follow Up   Return in about 3 weeks (around 4/8/2024) for Recheck.  Patient was given instructions and counseling regarding his condition or for health maintenance advice. Please see specific information  pulled into the AVS if appropriate.

## 2024-04-08 DIAGNOSIS — E78.2 MIXED HYPERLIPIDEMIA: ICD-10-CM

## 2024-04-08 RX ORDER — FENOFIBRATE 145 MG/1
145 TABLET, COATED ORAL
Qty: 14 TABLET | Refills: 0 | Status: SHIPPED | OUTPATIENT
Start: 2024-04-08

## 2024-04-22 ENCOUNTER — TELEPHONE (OUTPATIENT)
Dept: FAMILY MEDICINE CLINIC | Facility: CLINIC | Age: 54
End: 2024-04-22
Payer: MEDICARE

## 2024-04-22 NOTE — TELEPHONE ENCOUNTER
Caller: Robby Hernandes    Relationship: Self    Best call back number: [phone]       What was the call regarding:       THE PATIENT SAID HE IS WANTING TO CANCEL THE SLEEP REFERRAL. HE SAID HE DO NOT HAVE SLEEP APNEA. HE SAID HE IS HAVING TROUBLE FALLING ASLEEEP

## 2024-05-02 ENCOUNTER — OFFICE VISIT (OUTPATIENT)
Dept: FAMILY MEDICINE CLINIC | Facility: CLINIC | Age: 54
End: 2024-05-02
Payer: MEDICARE

## 2024-05-02 VITALS
DIASTOLIC BLOOD PRESSURE: 80 MMHG | WEIGHT: 190 LBS | SYSTOLIC BLOOD PRESSURE: 124 MMHG | BODY MASS INDEX: 28.06 KG/M2 | HEART RATE: 102 BPM | TEMPERATURE: 97.3 F | OXYGEN SATURATION: 95 %

## 2024-05-02 DIAGNOSIS — Z59.82 TRANSPORTATION INSECURITY: ICD-10-CM

## 2024-05-02 DIAGNOSIS — I10 PRIMARY HYPERTENSION: ICD-10-CM

## 2024-05-02 DIAGNOSIS — E78.2 MIXED HYPERLIPIDEMIA: ICD-10-CM

## 2024-05-02 DIAGNOSIS — Z59.819 HOUSING INSECURITY: ICD-10-CM

## 2024-05-02 DIAGNOSIS — D75.1 POLYCYTHEMIA: ICD-10-CM

## 2024-05-02 DIAGNOSIS — Z59.41 FOOD INSECURITY: ICD-10-CM

## 2024-05-02 DIAGNOSIS — E83.52 HYPERCALCEMIA: Primary | ICD-10-CM

## 2024-05-02 LAB
25(OH)D3 SERPL-MCNC: 35.2 NG/ML (ref 30–100)
ALBUMIN SERPL-MCNC: 4.3 G/DL (ref 3.5–5.2)
ALBUMIN/GLOB SERPL: 1.4 G/DL
ALP SERPL-CCNC: 49 U/L (ref 39–117)
ALT SERPL W P-5'-P-CCNC: 15 U/L (ref 1–41)
ANION GAP SERPL CALCULATED.3IONS-SCNC: 15 MMOL/L (ref 5–15)
AST SERPL-CCNC: 26 U/L (ref 1–40)
BILIRUB SERPL-MCNC: 0.4 MG/DL (ref 0–1.2)
BUN SERPL-MCNC: 12 MG/DL (ref 6–20)
BUN/CREAT SERPL: 17.9 (ref 7–25)
CALCIUM SPEC-SCNC: 9.9 MG/DL (ref 8.6–10.5)
CHLORIDE SERPL-SCNC: 101 MMOL/L (ref 98–107)
CO2 SERPL-SCNC: 23 MMOL/L (ref 22–29)
CREAT SERPL-MCNC: 0.67 MG/DL (ref 0.76–1.27)
EGFRCR SERPLBLD CKD-EPI 2021: 110.3 ML/MIN/1.73
GLOBULIN UR ELPH-MCNC: 3.1 GM/DL
GLUCOSE SERPL-MCNC: 113 MG/DL (ref 65–99)
H. PYLORI ANTIGEN STOOL: NEGATIVE
POTASSIUM SERPL-SCNC: 4.4 MMOL/L (ref 3.5–5.2)
PROT SERPL-MCNC: 7.4 G/DL (ref 6–8.5)
PTH-INTACT SERPL-MCNC: 14.5 PG/ML (ref 15–65)
SODIUM SERPL-SCNC: 139 MMOL/L (ref 136–145)

## 2024-05-02 PROCEDURE — 82306 VITAMIN D 25 HYDROXY: CPT | Performed by: FAMILY MEDICINE

## 2024-05-02 PROCEDURE — 3044F HG A1C LEVEL LT 7.0%: CPT | Performed by: FAMILY MEDICINE

## 2024-05-02 PROCEDURE — 87338 HPYLORI STOOL AG IA: CPT | Performed by: FAMILY MEDICINE

## 2024-05-02 PROCEDURE — 82397 CHEMILUMINESCENT ASSAY: CPT | Performed by: FAMILY MEDICINE

## 2024-05-02 PROCEDURE — 80053 COMPREHEN METABOLIC PANEL: CPT | Performed by: FAMILY MEDICINE

## 2024-05-02 PROCEDURE — 1159F MED LIST DOCD IN RCRD: CPT | Performed by: FAMILY MEDICINE

## 2024-05-02 PROCEDURE — 3074F SYST BP LT 130 MM HG: CPT | Performed by: FAMILY MEDICINE

## 2024-05-02 PROCEDURE — 1160F RVW MEDS BY RX/DR IN RCRD: CPT | Performed by: FAMILY MEDICINE

## 2024-05-02 PROCEDURE — 83970 ASSAY OF PARATHORMONE: CPT | Performed by: FAMILY MEDICINE

## 2024-05-02 PROCEDURE — 3079F DIAST BP 80-89 MM HG: CPT | Performed by: FAMILY MEDICINE

## 2024-05-02 RX ORDER — HYDROCHLOROTHIAZIDE 25 MG/1
12.5 TABLET ORAL DAILY
Qty: 90 TABLET | Refills: 1 | Status: SHIPPED | OUTPATIENT
Start: 2024-05-02

## 2024-05-02 RX ORDER — ATORVASTATIN CALCIUM 20 MG/1
20 TABLET, FILM COATED ORAL DAILY
Qty: 30 TABLET | Refills: 5 | Status: SHIPPED | OUTPATIENT
Start: 2024-05-02 | End: 2024-05-02

## 2024-05-02 RX ORDER — ATORVASTATIN CALCIUM 40 MG/1
20 TABLET, FILM COATED ORAL DAILY
Qty: 90 TABLET | Refills: 1 | Status: SHIPPED | OUTPATIENT
Start: 2024-05-02

## 2024-05-02 SDOH — ECONOMIC STABILITY - HOUSING INSECURITY: HOUSING INSTABILITY UNSPECIFIED: Z59.819

## 2024-05-02 SDOH — ECONOMIC STABILITY - TRANSPORTATION SECURITY: TRANSPORTATION INSECURITY: Z59.82

## 2024-05-02 SDOH — ECONOMIC STABILITY - FOOD INSECURITY: FOOD INSECURITY: Z59.41

## 2024-05-02 NOTE — PROGRESS NOTES
Venipuncture Blood Specimen Collection  Venipuncture performed in left arm  by Zaida Coello with good hemostasis. Patient tolerated the procedure well without complications.   05/02/24   Zaida Coello

## 2024-05-02 NOTE — PROGRESS NOTES
Chief Complaint  Vomiting (Follow up from last visit)    Subjective          Robby Hernandes presents to [facility]  History of Present Illness  Discussed labs  Hypercalcemia  Polycythemia  Pt says that his vomiting has stopped now- he had vomiting previously- he realized he was taking too many meds of some of his meds- he has now straightened this out    Pt says that he cannot afford his food and doctors  Pt urged to quit smoking cigars and drinking any alcohol in order to save more money and live healthier               Objective   Allergies   Allergen Reactions    Lyrica [Pregabalin] Hallucinations    Tree Extract Other (See Comments)     Runny nose, sneezing, congestion     Immunization History   Administered Date(s) Administered    Flu Vaccine Split Quad 09/25/2017, 10/05/2017, 09/03/2018, 12/05/2019    Fluzone (or Fluarix & Flulaval for VFC) >6mos 09/25/2017, 12/07/2021, 08/01/2023    Hepatitis A 04/11/2019    Influenza Inj MDCK Preserative Free 11/02/2015    Influenza Injectable Mdck Pf Quad 10/20/2022    Influenza Quad Vaccine (Inpatient) 08/10/2016    Influenza, Unspecified 11/02/2015, 10/19/2022    Pneumococcal Conjugate 20-Valent (PCV20) 06/28/2022    Pneumococcal Polysaccharide (PPSV23) 08/10/2016, 12/05/2019    Shingrix 10/20/2022, 08/14/2023    Tdap 07/28/2017, 01/07/2019, 08/14/2023     Past Medical History:   Diagnosis Date    Anxiety and depression     Asthma     Chronic knee pain     RIGHT    Complex regional pain syndrome     LEFT ANKLE    COPD (chronic obstructive pulmonary disease)     Diabetes mellitus     Fracture of nasal septum 1987    CAN NOT BREATH OUT OF RIGHT NOSTRIL    Hiatal hernia     High cholesterol     Hip pain, chronic, left     History of heart attack     PER EKG DR MILIAN    History of kidney stones     [place] (hard of hearing)     Hypertension     Insomnia     Low back pain     Shoulder pain, left     Sleep apnea     DOES NOT USE MACHINE    Tinnitus        Past Surgical History:   Procedure Laterality Date    ANKLE FUSION Left     CARPAL TUNNEL RELEASE Right     COLONOSCOPY      Cologaurd    CYSTOSCOPY BLADDER STONE LITHOTRIPSY      SPINAL CORD STIMULATOR IMPLANT      battery is on the right    TOTAL HIP ARTHROPLASTY Right     TOTAL HIP ARTHROPLASTY Left 2018    Procedure: LT TOTAL HIP ARTHROPLASTY ANTERIOR WITH HANA TABLE;  Surgeon: Dameon Briggs MD;  Location: [facility] OR;  Service:       Social History     Socioeconomic History    Marital status: Single   Tobacco Use    Smoking status: Every Day     Current packs/day: 0.00     Average packs/day: 2.0 packs/day for 22.1 years (44.1 ttl pk-yrs)     Types: Cigarettes, Cigars     Start date: 1985     Last attempt to quit:      Years since quittin.3     Passive exposure: Past    Smokeless tobacco: Never   Vaping Use    Vaping status: Never Used   Substance and Sexual Activity    Alcohol use: Yes     Alcohol/week: 15.0 standard drinks of alcohol     Types: 15 Cans of beer per week    Drug use: Yes     Types: Marijuana    Sexual activity: Defer        Current Outpatient Medications:     aspirin (Aspirin Low Dose) 81 MG EC tablet, Take 1 tablet by mouth Daily., Disp: 90 tablet, Rfl: 1    atorvastatin (LIPITOR) 40 MG tablet, Take 0.5 tablets by mouth Daily., Disp: 90 tablet, Rfl: 1    baclofen (LIORESAL) 10 MG tablet, Take 1 tablet by mouth 3 (Three) Times a Day As Needed for Muscle Spasms., Disp: 270 tablet, Rfl: 1    Blood Glucose Monitoring Suppl (FreeStyle Lite) w/Device kit, 1 each Daily., Disp: 1 kit, Rfl: 0    glucose blood (FREESTYLE LITE) test strip, Test BG daily, Disp: 100 each, Rfl: 3    hydroCHLOROthiazide 25 MG tablet, Take 0.5 tablets by mouth Daily., Disp: 90 tablet, Rfl: 1    Lancets (freestyle) lancets, Test BG daily, Disp: 100 each, Rfl: 3    losartan (COZAAR) 100 MG tablet, Take 1 tablet by mouth Daily., Disp: 90 tablet, Rfl: 1    metFORMIN (GLUCOPHAGE) 500 MG tablet, Take 2  tablets by mouth 2 (Two) Times a Day With Meals., Disp: 360 tablet, Rfl: 1    pantoprazole (PROTONIX) 40 MG EC tablet, Take 1 tablet by mouth Daily., Disp: 90 tablet, Rfl: 1    venlafaxine XR (EFFEXOR-XR) 150 MG 24 hr capsule, Take 1 capsule by mouth Daily., Disp: 90 capsule, Rfl: 1   Family History   Problem Relation Age of Onset    Asthma Mother     COPD Mother     Heart disease Mother     Arthritis Mother     Fibromyalgia Mother     Heart disease Father     Malig Hyperthermia Neg Hx           Vital Signs:   Vitals:    05/02/24 1306   BP: 124/80   Pulse: 102   Temp: 97.3 °F (36.3 °C)   SpO2: 95%   Weight: 86.2 kg (190 lb)       Review of Systems   Constitutional:  Negative for fatigue and fever.   HENT:  Negative for sore throat.    Eyes:  Negative for visual disturbance.   Respiratory:  Negative for cough, chest tightness, shortness of breath and wheezing.    Cardiovascular:  Negative for chest pain, palpitations and leg swelling.   Gastrointestinal:  Negative for abdominal pain, diarrhea, nausea and vomiting.   Neurological:  Negative for light-headedness and headaches.      Physical Exam  Vitals reviewed.   Constitutional:       Appearance: Normal appearance. He is well-developed.   HENT:      Head: Normocephalic and atraumatic.      Right Ear: External ear normal.      Left Ear: External ear normal.      Mouth/Throat:      Pharynx: No oropharyngeal exudate.   Eyes:      Conjunctiva/sclera: Conjunctivae normal.      Pupils: Pupils are equal, round, and reactive to light.   Cardiovascular:      Rate and Rhythm: Normal rate and regular rhythm.      Pulses: Normal pulses.      Heart sounds: Normal heart sounds. No murmur heard.     No friction rub. No gallop.   Pulmonary:      Effort: Pulmonary effort is normal.      Breath sounds: Normal breath sounds. No wheezing or rhonchi.   Abdominal:      General: Abdomen is flat. Bowel sounds are normal. There is no distension.      Palpations: Abdomen is soft. There is no  mass.      Tenderness: There is no abdominal tenderness. There is no guarding or rebound.      Hernia: No hernia is present.   Musculoskeletal:         General: Normal range of motion.   Skin:     General: Skin is warm and dry.      Capillary Refill: Capillary refill takes less than 2 seconds.   Neurological:      General: No focal deficit present.      Mental Status: He is alert and oriented to person, place, and time.      Cranial Nerves: No cranial nerve deficit.   Psychiatric:         Mood and Affect: Mood and affect normal.         Behavior: Behavior normal.         Thought Content: Thought content normal.         Judgment: Judgment normal.        Result Review :   The following data was reviewed by: Wei Lauren MD on 05/02/2024:  CMP          9/8/2023    08:03 3/18/2024    08:46   CMP   Glucose 101  104    BUN 8  22    Creatinine 0.67  1.03    EGFR 111.0  85.8    Sodium 143  134    Potassium 4.2  4.5    Chloride 106  95    Calcium 10.0  10.9    Total Protein 7.3  7.9    Albumin 4.3  4.9    Globulin 3.0  3.0    Total Bilirubin 0.5  0.5    Alkaline Phosphatase 41  60    AST (SGOT) 21  18    ALT (SGPT) 12  20    Albumin/Globulin Ratio 1.4  1.6    BUN/Creatinine Ratio 11.9  21.4    Anion Gap 11.0  12.0      CBC          9/8/2023    08:03 3/18/2024    08:46   CBC   WBC 7.54  10.04    RBC 5.72  6.25    Hemoglobin 19.0  20.8    Hematocrit 55.0  60.2    MCV 96.2  96.3    MCH 33.2  33.3    MCHC 34.5  34.6    RDW 12.4  13.7    Platelets 231  192      Lipid Panel          9/8/2023    08:03 3/18/2024    08:46   Lipid Panel   Total Cholesterol 244  198    Triglycerides 135  154    HDL Cholesterol 50  55    VLDL Cholesterol 24  27    LDL Cholesterol  170  116    LDL/HDL Ratio 3.34  2.04      TSH          9/8/2023    08:03 3/18/2024    08:46   TSH   TSH 1.260  2.630      Most Recent A1C          3/18/2024    08:46   HGBA1C Most Recent   Hemoglobin A1C 5.70      Microalbumin          9/8/2023    08:03 3/18/2024    08:49    Microalbumin   Microalbumin, Urine 1.4  <1.2      PSA          9/8/2023    08:03   PSA   PSA 0.775                Assessment and Plan    Diagnoses and all orders for this visit:    1. Hypercalcemia (Primary)  -     Comprehensive Metabolic Panel  -     PTH, Intact  -     Vitamin D,25-Hydroxy  -     PTH-related Peptide    2. Polycythemia    3. Food insecurity  -     Ambulatory Referral to Social Care Services (Amb Case Mgmt)    4. Transportation insecurity  -     Ambulatory Referral to Social Care Services (Amb Case Mgmt)    5. Housing insecurity  -     Ambulatory Referral to Social Care Services (Amb Case Mgmt)    6. Mixed hyperlipidemia  -     Discontinue: atorvastatin (LIPITOR) 20 MG tablet; Take 1 tablet by mouth Daily.  Dispense: 30 tablet; Refill: 5  -     atorvastatin (LIPITOR) 40 MG tablet; Take 0.5 tablets by mouth Daily.  Dispense: 90 tablet; Refill: 1    7. Primary hypertension  -     hydroCHLOROthiazide 25 MG tablet; Take 0.5 tablets by mouth Daily.  Dispense: 90 tablet; Refill: 1            Follow Up   Return in about 1 month (around 6/2/2024) for Recheck.  Patient was given instructions and counseling regarding his condition or for health maintenance advice. Please see specific information pulled into the AVS if appropriate.

## 2024-05-03 ENCOUNTER — REFERRAL TRIAGE (OUTPATIENT)
Age: 54
End: 2024-05-03
Payer: MEDICARE

## 2024-05-06 ENCOUNTER — PATIENT OUTREACH (OUTPATIENT)
Age: 54
End: 2024-05-06
Payer: MEDICARE

## 2024-05-07 RX ORDER — SUCRALFATE 1 G/1
1 TABLET ORAL 4 TIMES DAILY
Qty: 360 TABLET | OUTPATIENT
Start: 2024-05-07

## 2024-05-08 LAB — PTH RELATED PROT SERPL-SCNC: <2 PMOL/L

## 2024-06-24 ENCOUNTER — TRANSCRIBE ORDERS (OUTPATIENT)
Dept: ADMINISTRATIVE | Facility: HOSPITAL | Age: 54
End: 2024-06-24
Payer: MEDICARE

## 2024-06-24 DIAGNOSIS — J44.9 CHRONIC OBSTRUCTIVE PULMONARY DISEASE, UNSPECIFIED COPD TYPE: Primary | ICD-10-CM

## 2024-06-24 DIAGNOSIS — R91.1 LUNG NODULE: ICD-10-CM

## 2024-07-11 ENCOUNTER — HOSPITAL ENCOUNTER (OUTPATIENT)
Dept: CT IMAGING | Facility: HOSPITAL | Age: 54
Discharge: HOME | End: 2024-07-11
Admitting: NURSE PRACTITIONER
Payer: MEDICARE

## 2024-07-11 DIAGNOSIS — J44.9 CHRONIC OBSTRUCTIVE PULMONARY DISEASE, UNSPECIFIED COPD TYPE: ICD-10-CM

## 2024-07-11 DIAGNOSIS — R91.1 LUNG NODULE: ICD-10-CM

## 2024-07-11 LAB
CREAT BLDA-MCNC: 0.7 MG/DL (ref 0.6–1.3)
EGFRCR SERPLBLD CKD-EPI 2021: 108.8 ML/MIN/1.73

## 2024-07-11 PROCEDURE — 71260 CT THORAX DX C+: CPT

## 2024-07-11 PROCEDURE — 25510000001 IOPAMIDOL PER 1 ML: Performed by: NURSE PRACTITIONER

## 2024-07-11 PROCEDURE — 82565 ASSAY OF CREATININE: CPT

## 2024-07-11 RX ADMIN — IOPAMIDOL 100 ML: 755 INJECTION, SOLUTION INTRAVENOUS at 09:55

## 2025-01-02 DIAGNOSIS — G47.00 INSOMNIA, UNSPECIFIED TYPE: ICD-10-CM

## 2025-01-02 RX ORDER — TRAZODONE HYDROCHLORIDE 50 MG/1
50 TABLET, FILM COATED ORAL NIGHTLY
Qty: 30 TABLET | Refills: 1 | OUTPATIENT
Start: 2025-01-02

## 2025-01-13 DIAGNOSIS — I10 PRIMARY HYPERTENSION: ICD-10-CM

## 2025-01-13 RX ORDER — HYDROCHLOROTHIAZIDE 25 MG/1
TABLET ORAL
Qty: 45 TABLET | OUTPATIENT
Start: 2025-01-13

## 2025-01-14 DIAGNOSIS — I10 PRIMARY HYPERTENSION: ICD-10-CM

## 2025-01-14 RX ORDER — LOSARTAN POTASSIUM 100 MG/1
100 TABLET ORAL DAILY
Qty: 90 TABLET | Refills: 1 | OUTPATIENT
Start: 2025-01-14

## 2025-02-24 DIAGNOSIS — E11.65 TYPE 2 DIABETES MELLITUS WITH HYPERGLYCEMIA, WITHOUT LONG-TERM CURRENT USE OF INSULIN: ICD-10-CM

## 2025-02-24 RX ORDER — ASPIRIN 81 MG/1
81 TABLET, COATED ORAL DAILY
Qty: 90 TABLET | Refills: 1 | OUTPATIENT
Start: 2025-02-24

## 2025-03-05 DIAGNOSIS — Z12.11 COLON CANCER SCREENING: Primary | ICD-10-CM

## 2025-03-17 ENCOUNTER — TRANSCRIBE ORDERS (OUTPATIENT)
Dept: ADMINISTRATIVE | Facility: HOSPITAL | Age: 55
End: 2025-03-17
Payer: MEDICARE

## 2025-03-17 ENCOUNTER — TRANSCRIBE ORDERS (OUTPATIENT)
Dept: FAMILY MEDICINE CLINIC | Facility: CLINIC | Age: 55
End: 2025-03-17
Payer: MEDICARE

## 2025-03-17 DIAGNOSIS — M79.662 PAIN OF LEFT LOWER LEG: ICD-10-CM

## 2025-03-17 DIAGNOSIS — Q66.89 OTHER SPECIFIED CONGENITAL DEFORMITIES OF FEET: ICD-10-CM

## 2025-03-17 DIAGNOSIS — M79.661 PAIN OF RIGHT LOWER LEG: ICD-10-CM

## 2025-03-17 DIAGNOSIS — R23.0 CYANOSIS: Primary | ICD-10-CM

## 2025-03-18 ENCOUNTER — TRANSCRIBE ORDERS (OUTPATIENT)
Dept: ADMINISTRATIVE | Facility: HOSPITAL | Age: 55
End: 2025-03-18
Payer: MEDICARE

## 2025-03-18 DIAGNOSIS — M79.661 PAIN IN BOTH LOWER LEGS: ICD-10-CM

## 2025-03-18 DIAGNOSIS — Q66.89 OTHER SPECIFIED CONGENITAL DEFORMITIES OF FEET: Primary | ICD-10-CM

## 2025-03-18 DIAGNOSIS — R23.0 CYANOSIS: ICD-10-CM

## 2025-03-18 DIAGNOSIS — M79.662 PAIN IN BOTH LOWER LEGS: ICD-10-CM

## 2025-03-26 DIAGNOSIS — E11.65 TYPE 2 DIABETES MELLITUS WITH HYPERGLYCEMIA, WITHOUT LONG-TERM CURRENT USE OF INSULIN: ICD-10-CM

## 2025-03-28 DIAGNOSIS — M25.531 PAIN IN BOTH WRISTS: ICD-10-CM

## 2025-03-28 DIAGNOSIS — M25.532 PAIN IN BOTH WRISTS: ICD-10-CM

## 2025-03-28 RX ORDER — CELECOXIB 200 MG/1
200 CAPSULE ORAL DAILY
Qty: 30 CAPSULE | Refills: 5 | OUTPATIENT
Start: 2025-03-28

## 2025-03-28 NOTE — TELEPHONE ENCOUNTER
Have tried to leave multiple messages, the mail box is full and wont accept messages,  Pt needs to schedule an appt for refills

## 2025-05-08 ENCOUNTER — HOSPITAL ENCOUNTER (OUTPATIENT)
Dept: CARDIOLOGY | Facility: HOSPITAL | Age: 55
Discharge: HOME | End: 2025-05-08
Payer: MEDICARE

## 2025-05-08 DIAGNOSIS — M79.662 PAIN IN BOTH LOWER LEGS: ICD-10-CM

## 2025-05-08 DIAGNOSIS — R23.0 CYANOSIS: ICD-10-CM

## 2025-05-08 DIAGNOSIS — Q66.89 OTHER SPECIFIED CONGENITAL DEFORMITIES OF FEET: ICD-10-CM

## 2025-05-08 DIAGNOSIS — I73.9 PVD (PERIPHERAL VASCULAR DISEASE): ICD-10-CM

## 2025-05-08 DIAGNOSIS — M79.661 PAIN IN BOTH LOWER LEGS: ICD-10-CM

## 2025-05-08 DIAGNOSIS — M79.662 PAIN OF LEFT LOWER LEG: ICD-10-CM

## 2025-05-08 DIAGNOSIS — M79.661 PAIN OF RIGHT LOWER LEG: ICD-10-CM

## 2025-05-08 LAB
BH CV LOWER ARTERIAL LEFT ABI RATIO: 1.25
BH CV LOWER ARTERIAL LEFT DORSALIS PEDIS SYS MAX: 172
BH CV LOWER ARTERIAL LEFT GREAT TOE SYS MAX: 144
BH CV LOWER ARTERIAL LEFT POST TIBIAL SYS MAX: 188
BH CV LOWER ARTERIAL LEFT TBI RATIO: 0.95
BH CV LOWER ARTERIAL RIGHT ABI RATIO: 1.29
BH CV LOWER ARTERIAL RIGHT DORSALIS PEDIS SYS MAX: 195
BH CV LOWER ARTERIAL RIGHT GREAT TOE SYS MAX: 172
BH CV LOWER ARTERIAL RIGHT POST TIBIAL SYS MAX: 194
BH CV LOWER ARTERIAL RIGHT TBI RATIO: 1.14
BH CV LOWER VASCULAR LEFT COMMON FEMORAL AUGMENT: NORMAL
BH CV LOWER VASCULAR LEFT COMMON FEMORAL COMPETENT: NORMAL
BH CV LOWER VASCULAR LEFT COMMON FEMORAL COMPRESS: NORMAL
BH CV LOWER VASCULAR LEFT COMMON FEMORAL PHASIC: NORMAL
BH CV LOWER VASCULAR LEFT COMMON FEMORAL SPONT: NORMAL
BH CV LOWER VASCULAR LEFT DISTAL FEMORAL COMPRESS: NORMAL
BH CV LOWER VASCULAR LEFT GASTRONEMIUS COMPRESS: NORMAL
BH CV LOWER VASCULAR LEFT GREATER SAPH AK COMPRESS: NORMAL
BH CV LOWER VASCULAR LEFT GREATER SAPH BK COMPRESS: NORMAL
BH CV LOWER VASCULAR LEFT LESSER SAPH COMPRESS: NORMAL
BH CV LOWER VASCULAR LEFT MID FEMORAL AUGMENT: NORMAL
BH CV LOWER VASCULAR LEFT MID FEMORAL COMPETENT: NORMAL
BH CV LOWER VASCULAR LEFT MID FEMORAL COMPRESS: NORMAL
BH CV LOWER VASCULAR LEFT MID FEMORAL PHASIC: NORMAL
BH CV LOWER VASCULAR LEFT MID FEMORAL SPONT: NORMAL
BH CV LOWER VASCULAR LEFT PERONEAL COMPRESS: NORMAL
BH CV LOWER VASCULAR LEFT POPLITEAL AUGMENT: NORMAL
BH CV LOWER VASCULAR LEFT POPLITEAL COMPETENT: NORMAL
BH CV LOWER VASCULAR LEFT POPLITEAL COMPRESS: NORMAL
BH CV LOWER VASCULAR LEFT POPLITEAL PHASIC: NORMAL
BH CV LOWER VASCULAR LEFT POPLITEAL SPONT: NORMAL
BH CV LOWER VASCULAR LEFT POSTERIOR TIBIAL COMPRESS: NORMAL
BH CV LOWER VASCULAR LEFT PROXIMAL FEMORAL COMPRESS: NORMAL
BH CV LOWER VASCULAR LEFT SAPHENOFEMORAL JUNCTION COMPRESS: NORMAL
BH CV LOWER VASCULAR RIGHT COMMON FEMORAL AUGMENT: NORMAL
BH CV LOWER VASCULAR RIGHT COMMON FEMORAL COMPETENT: NORMAL
BH CV LOWER VASCULAR RIGHT COMMON FEMORAL COMPRESS: NORMAL
BH CV LOWER VASCULAR RIGHT COMMON FEMORAL PHASIC: NORMAL
BH CV LOWER VASCULAR RIGHT COMMON FEMORAL SPONT: NORMAL
BH CV LOWER VASCULAR RIGHT DISTAL FEMORAL COMPRESS: NORMAL
BH CV LOWER VASCULAR RIGHT GASTRONEMIUS COMPRESS: NORMAL
BH CV LOWER VASCULAR RIGHT GREATER SAPH AK COMPRESS: NORMAL
BH CV LOWER VASCULAR RIGHT GREATER SAPH BK COMPRESS: NORMAL
BH CV LOWER VASCULAR RIGHT LESSER SAPH COMPRESS: NORMAL
BH CV LOWER VASCULAR RIGHT MID FEMORAL AUGMENT: NORMAL
BH CV LOWER VASCULAR RIGHT MID FEMORAL COMPETENT: NORMAL
BH CV LOWER VASCULAR RIGHT MID FEMORAL COMPRESS: NORMAL
BH CV LOWER VASCULAR RIGHT MID FEMORAL PHASIC: NORMAL
BH CV LOWER VASCULAR RIGHT MID FEMORAL SPONT: NORMAL
BH CV LOWER VASCULAR RIGHT PERONEAL COMPRESS: NORMAL
BH CV LOWER VASCULAR RIGHT POPLITEAL AUGMENT: NORMAL
BH CV LOWER VASCULAR RIGHT POPLITEAL COMPETENT: NORMAL
BH CV LOWER VASCULAR RIGHT POPLITEAL COMPRESS: NORMAL
BH CV LOWER VASCULAR RIGHT POPLITEAL PHASIC: NORMAL
BH CV LOWER VASCULAR RIGHT POPLITEAL SPONT: NORMAL
BH CV LOWER VASCULAR RIGHT POSTERIOR TIBIAL COMPRESS: NORMAL
BH CV LOWER VASCULAR RIGHT PROXIMAL FEMORAL COMPRESS: NORMAL
BH CV LOWER VASCULAR RIGHT SAPHENOFEMORAL JUNCTION COMPRESS: NORMAL
UPPER ARTERIAL LEFT ARM BRACHIAL SYS MAX: 144
UPPER ARTERIAL RIGHT ARM BRACHIAL SYS MAX: 151

## 2025-05-08 PROCEDURE — 93970 EXTREMITY STUDY: CPT

## 2025-05-08 PROCEDURE — 93922 UPR/L XTREMITY ART 2 LEVELS: CPT

## 2025-06-17 ENCOUNTER — EXTERNAL PBMM DATA (OUTPATIENT)
Dept: PHARMACY | Facility: OTHER | Age: 55
End: 2025-06-17
Payer: MEDICARE

## 2025-06-27 ENCOUNTER — EXTERNAL PBMM DATA (OUTPATIENT)
Dept: PHARMACY | Facility: OTHER | Age: 55
End: 2025-06-27
Payer: MEDICARE

## (undated) DEVICE — SYR LUERLOK 50ML

## (undated) DEVICE — TRY SKINPREP DRYPREP

## (undated) DEVICE — MEDICINE CUP, GRADUATED, STER: Brand: MEDLINE

## (undated) DEVICE — MAT FLR ABSORBENT LG 4FT 10 2.5FT

## (undated) DEVICE — GLV SURG SENSICARE W/ALOE PF LF 8 STRL

## (undated) DEVICE — APPL CHLORAPREP W/TINT 26ML ORNG

## (undated) DEVICE — ANTIBACTERIAL UNDYED BRAIDED (POLYGLACTIN 910), SYNTHETIC ABSORBABLE SUTURE: Brand: COATED VICRYL

## (undated) DEVICE — SPNG LAP 18X18IN LF STRL PK/5

## (undated) DEVICE — 3M™ IOBAN™ 2 ANTIMICROBIAL INCISE DRAPE 6640EZ: Brand: IOBAN™ 2

## (undated) DEVICE — PREP SOL POVIDONE/IODINE BT 4OZ

## (undated) DEVICE — SUT MNCRYL 3/0 PS2 18IN MCP497G

## (undated) DEVICE — DRSNG TELFA PAD NONADH STR 1S 3X4IN

## (undated) DEVICE — DRSNG SURESITE123 4X10IN

## (undated) DEVICE — 3M(TM) TEGADERM(TM) TRANSPARENT FILM DRESSING FRAME STYLE 1627: Brand: 3M™ TEGADERM™

## (undated) DEVICE — PK ANT HIP 40

## (undated) DEVICE — ADHS SKIN DERMABOND TOP ADVANCED

## (undated) DEVICE — DRAPE,U/ SHT,SPLIT,PLAS,STERIL: Brand: MEDLINE

## (undated) DEVICE — RECIPROCATING BLADE HEAVY DUTY LONG, OFFSET  (77.6 X 0.77 X 11.2MM)

## (undated) DEVICE — GLV SURG BIOGEL LTX PF 8

## (undated) DEVICE — SUT VIC 0 CT1 36IN J946H

## (undated) DEVICE — DRSNG TELFA PAD NONADH STR 1S 3X8IN

## (undated) DEVICE — SOL ISO/ALC RUB 70PCT 4OZ

## (undated) DEVICE — CONTAINER,SPECIMEN,OR STERILE,4OZ: Brand: MEDLINE

## (undated) DEVICE — GLV SURG BIOGEL LTX PF 8 1/2